# Patient Record
Sex: FEMALE | Race: WHITE | NOT HISPANIC OR LATINO | Employment: UNEMPLOYED | URBAN - NONMETROPOLITAN AREA
[De-identification: names, ages, dates, MRNs, and addresses within clinical notes are randomized per-mention and may not be internally consistent; named-entity substitution may affect disease eponyms.]

---

## 2018-11-22 ENCOUNTER — HOSPITAL ENCOUNTER (EMERGENCY)
Facility: HOSPITAL | Age: 73
Discharge: HOME | End: 2018-11-22
Attending: FAMILY MEDICINE

## 2018-11-22 VITALS
DIASTOLIC BLOOD PRESSURE: 76 MMHG | TEMPERATURE: 97.8 F | HEART RATE: 78 BPM | HEIGHT: 63 IN | OXYGEN SATURATION: 98 % | WEIGHT: 143 LBS | BODY MASS INDEX: 25.34 KG/M2 | SYSTOLIC BLOOD PRESSURE: 131 MMHG | RESPIRATION RATE: 18 BRPM

## 2018-11-22 DIAGNOSIS — S61.412A LACERATION OF LEFT HAND WITHOUT FOREIGN BODY, INITIAL ENCOUNTER: Primary | ICD-10-CM

## 2018-11-22 PROCEDURE — 99283 EMERGENCY DEPT VISIT LOW MDM: CPT

## 2018-11-22 RX ORDER — HYDROCHLOROTHIAZIDE 12.5 MG/1
12.5 TABLET ORAL DAILY
COMMUNITY

## 2018-11-22 RX ORDER — ATORVASTATIN CALCIUM 10 MG/1
10 TABLET, FILM COATED ORAL DAILY
COMMUNITY

## 2018-11-22 RX ORDER — LIDOCAINE HYDROCHLORIDE 10 MG/ML
5 INJECTION, SOLUTION EPIDURAL; INFILTRATION; INTRACAUDAL; PERINEURAL ONCE
Status: COMPLETED | OUTPATIENT
Start: 2018-11-22 | End: 2018-11-22

## 2018-11-22 RX ORDER — ASPIRIN 81 MG/1
81 TABLET ORAL DAILY
COMMUNITY

## 2018-11-22 RX ADMIN — LIDOCAINE HYDROCHLORIDE 5 ML: 10 INJECTION, SOLUTION EPIDURAL; INFILTRATION; INTRACAUDAL at 17:14

## 2018-11-22 NOTE — ED PROVIDER NOTES
Subjective   73-year-old female who presents to the ED today for a laceration to the left hand.  She states she was carving a turkey when the knife slipped and cut her hand.  This occurred approximately 90 minutes ago.  She states her tetanus vaccination is up-to-date.  She denies any other injury.        History provided by:  Patient  Laceration   Location:  Hand  Hand laceration location:  Dorsum of L hand  Length:  2 cm  Depth:  Through dermis  Quality: straight    Bleeding: controlled    Time since incident:  90 minutes  Laceration mechanism:  Knife  Pain details:     Quality:  Aching    Severity:  Mild    Timing:  Constant    Progression:  Unchanged  Foreign body present:  No foreign bodies  Relieved by:  Nothing  Worsened by:  Nothing  Tetanus status:  Up to date  Associated symptoms: no redness and no swelling        Review of Systems   Constitutional: Negative.    HENT: Negative.    Eyes: Negative.    Respiratory: Negative.    Cardiovascular: Negative.    Gastrointestinal: Negative.    Genitourinary: Negative.    Musculoskeletal: Negative.    Skin: Positive for wound.   Neurological: Negative.    Psychiatric/Behavioral: Negative.    All other systems reviewed and are negative.      Past Medical History:   Diagnosis Date   • Hyperlipidemia    • Hypertension        Allergies   Allergen Reactions   • Sulfa Antibiotics Hives       History reviewed. No pertinent surgical history.    History reviewed. No pertinent family history.    Social History     Socioeconomic History   • Marital status:      Spouse name: Not on file   • Number of children: Not on file   • Years of education: Not on file   • Highest education level: Not on file   Tobacco Use   • Smoking status: Never Smoker   Substance and Sexual Activity   • Alcohol use: No     Frequency: Never   • Drug use: No   • Sexual activity: Defer           Objective   Physical Exam   Constitutional: She is oriented to person, place, and time. She appears  well-developed and well-nourished. No distress.   HENT:   Head: Normocephalic and atraumatic.   Right Ear: External ear normal.   Left Ear: External ear normal.   Nose: Nose normal.   Mouth/Throat: Oropharynx is clear and moist.   Eyes: Conjunctivae and EOM are normal. Pupils are equal, round, and reactive to light.   Neck: Normal range of motion. Neck supple.   Cardiovascular: Normal rate, regular rhythm, normal heart sounds and intact distal pulses.   Pulmonary/Chest: Effort normal and breath sounds normal.   Abdominal: Soft. Bowel sounds are normal.   Musculoskeletal: Normal range of motion.   2 cm laceration to the dorsum of the left hand along the area of the 5th metacarpal, bleeding controlled, no tendon damage noted.  Patient has full ROM of her fingers passively and against resistance.   Neurological: She is alert and oriented to person, place, and time.   Skin: Skin is warm and dry. Capillary refill takes less than 2 seconds.   Psychiatric: She has a normal mood and affect. Her behavior is normal. Judgment and thought content normal.   Nursing note and vitals reviewed.      Laceration Repair  Date/Time: 11/22/2018 5:17 PM  Performed by: Magali Lucero PA  Authorized by: Pau Scott DO     Consent:     Consent obtained:  Verbal    Consent given by:  Patient    Risks discussed:  Infection and pain  Anesthesia (see MAR for exact dosages):     Anesthesia method:  Local infiltration    Local anesthetic:  Lidocaine 1% w/o epi  Laceration details:     Location:  Hand    Hand location:  L hand, dorsum    Length (cm):  2  Repair type:     Repair type:  Simple  Pre-procedure details:     Preparation:  Patient was prepped and draped in usual sterile fashion  Exploration:     Hemostasis achieved with:  Direct pressure    Wound exploration: wound explored through full range of motion and entire depth of wound probed and visualized      Wound extent: no fascia violation noted, no foreign bodies/material  noted, no nerve damage noted, no tendon damage noted and no vascular damage noted      Contaminated: no    Treatment:     Area cleansed with:  Betadine    Amount of cleaning:  Standard  Skin repair:     Repair method:  Sutures    Suture size:  4-0    Suture material:  Nylon    Suture technique:  Simple interrupted    Number of sutures:  4  Approximation:     Approximation:  Close  Post-procedure details:     Dressing:  Non-adherent dressing    Patient tolerance of procedure:  Tolerated well, no immediate complications               ED Course  ED Course as of Nov 22 1837   Thu Nov 22, 2018   1718 Patient's laceration repaired and she tolerated well, she was given wound care instructions and will have her sutures removed in 7 days.  [AH]      ED Course User Index  [AH] Magali Lucero PA                  [facility]  Number of Diagnoses or Management Options  Laceration of left hand without foreign body, initial encounter:   Patient Progress  Patient progress: improved        Final diagnoses:   Laceration of left hand without foreign body, initial encounter            Magali Lucero PA  11/22/18 3607

## 2019-01-24 ENCOUNTER — COMPLETE SKIN EXAM (OUTPATIENT)
Dept: URBAN - METROPOLITAN AREA CLINIC 14 | Facility: CLINIC | Age: 74
Setting detail: DERMATOLOGY
End: 2019-01-24

## 2019-01-24 DIAGNOSIS — C44.529 SQUAMOUS CELL CARCINOMA OF SKIN OF OTHER PART OF TRUNK: ICD-10-CM

## 2019-01-24 PROCEDURE — 99203 OFFICE O/P NEW LOW 30 MIN: CPT

## 2021-04-09 ENCOUNTER — OFFICE VISIT (OUTPATIENT)
Dept: INTERNAL MEDICINE | Facility: CLINIC | Age: 76
End: 2021-04-09
Payer: MEDICARE

## 2021-04-09 VITALS
RESPIRATION RATE: 16 BRPM | HEIGHT: 63 IN | HEART RATE: 73 BPM | OXYGEN SATURATION: 98 % | DIASTOLIC BLOOD PRESSURE: 72 MMHG | SYSTOLIC BLOOD PRESSURE: 118 MMHG | WEIGHT: 143 LBS | BODY MASS INDEX: 25.34 KG/M2

## 2021-04-09 DIAGNOSIS — I10 ESSENTIAL HYPERTENSION: Primary | ICD-10-CM

## 2021-04-09 DIAGNOSIS — E78.5 HYPERLIPIDEMIA LDL GOAL <100: ICD-10-CM

## 2021-04-09 PROCEDURE — 99999 PR PBB SHADOW E&M-EST. PATIENT-LVL IV: CPT | Mod: PBBFAC,,, | Performed by: INTERNAL MEDICINE

## 2021-04-09 PROCEDURE — 99214 OFFICE O/P EST MOD 30 MIN: CPT | Mod: PBBFAC | Performed by: INTERNAL MEDICINE

## 2021-04-09 PROCEDURE — 99213 PR OFFICE/OUTPT VISIT, EST, LEVL III, 20-29 MIN: ICD-10-PCS | Mod: S$PBB,,, | Performed by: INTERNAL MEDICINE

## 2021-04-09 PROCEDURE — 99213 OFFICE O/P EST LOW 20 MIN: CPT | Mod: S$PBB,,, | Performed by: INTERNAL MEDICINE

## 2021-04-09 PROCEDURE — 99999 PR PBB SHADOW E&M-EST. PATIENT-LVL IV: ICD-10-PCS | Mod: PBBFAC,,, | Performed by: INTERNAL MEDICINE

## 2021-04-09 RX ORDER — ATORVASTATIN CALCIUM 10 MG/1
10 TABLET, FILM COATED ORAL EVERY OTHER DAY
COMMUNITY
Start: 2021-02-28 | End: 2022-01-24 | Stop reason: SDUPTHER

## 2021-04-09 RX ORDER — TRIAMTERENE/HYDROCHLOROTHIAZID 37.5-25 MG
1 TABLET ORAL DAILY
COMMUNITY
Start: 2021-02-08 | End: 2022-03-14 | Stop reason: SDUPTHER

## 2021-04-27 ENCOUNTER — OFFICE VISIT (OUTPATIENT)
Dept: OBSTETRICS AND GYNECOLOGY | Facility: CLINIC | Age: 76
End: 2021-04-27
Payer: MEDICARE

## 2021-04-27 VITALS
HEART RATE: 64 BPM | RESPIRATION RATE: 16 BRPM | WEIGHT: 145 LBS | DIASTOLIC BLOOD PRESSURE: 89 MMHG | HEIGHT: 63 IN | BODY MASS INDEX: 25.69 KG/M2 | SYSTOLIC BLOOD PRESSURE: 159 MMHG | TEMPERATURE: 98 F

## 2021-04-27 DIAGNOSIS — Z00.00 ANNUAL PHYSICAL EXAM: ICD-10-CM

## 2021-04-27 DIAGNOSIS — E78.9 LIPID DISORDER: ICD-10-CM

## 2021-04-27 DIAGNOSIS — R35.0 FREQUENCY OF URINATION: ICD-10-CM

## 2021-04-27 DIAGNOSIS — E55.9 VITAMIN D DEFICIENCY: ICD-10-CM

## 2021-04-27 DIAGNOSIS — N39.3 URINARY, INCONTINENCE, STRESS FEMALE: ICD-10-CM

## 2021-04-27 DIAGNOSIS — I10 ESSENTIAL HYPERTENSION: ICD-10-CM

## 2021-04-27 DIAGNOSIS — N89.8 VAGINAL ODOR: Primary | ICD-10-CM

## 2021-04-27 DIAGNOSIS — R39.15 URGENCY OF URINATION: ICD-10-CM

## 2021-04-27 PROCEDURE — 36415 COLL VENOUS BLD VENIPUNCTURE: CPT | Performed by: OBSTETRICS & GYNECOLOGY

## 2021-04-27 PROCEDURE — 80053 COMPREHEN METABOLIC PANEL: CPT | Mod: ,,, | Performed by: CLINICAL MEDICAL LABORATORY

## 2021-04-27 PROCEDURE — G0101 CA SCREEN;PELVIC/BREAST EXAM: HCPCS | Mod: ,,, | Performed by: OBSTETRICS & GYNECOLOGY

## 2021-04-27 PROCEDURE — 85025 COMPLETE CBC W/AUTO DIFF WBC: CPT | Mod: ,,, | Performed by: CLINICAL MEDICAL LABORATORY

## 2021-04-27 PROCEDURE — 80061 LIPID PANEL: ICD-10-PCS | Mod: ,,, | Performed by: CLINICAL MEDICAL LABORATORY

## 2021-04-27 PROCEDURE — 36415 PR COLLECTION VENOUS BLOOD,VENIPUNCTURE: ICD-10-PCS | Mod: ,,, | Performed by: OBSTETRICS & GYNECOLOGY

## 2021-04-27 PROCEDURE — 85025 CBC WITH DIFFERENTIAL: ICD-10-PCS | Mod: ,,, | Performed by: CLINICAL MEDICAL LABORATORY

## 2021-04-27 PROCEDURE — 80053 COMPREHENSIVE METABOLIC PANEL: ICD-10-PCS | Mod: ,,, | Performed by: CLINICAL MEDICAL LABORATORY

## 2021-04-27 PROCEDURE — 82306 VITAMIN D 25 HYDROXY: CPT | Mod: ,,, | Performed by: CLINICAL MEDICAL LABORATORY

## 2021-04-27 PROCEDURE — 80061 LIPID PANEL: CPT | Mod: ,,, | Performed by: CLINICAL MEDICAL LABORATORY

## 2021-04-27 PROCEDURE — 36415 COLL VENOUS BLD VENIPUNCTURE: CPT | Mod: ,,, | Performed by: OBSTETRICS & GYNECOLOGY

## 2021-04-27 PROCEDURE — 82306 VITAMIN D: ICD-10-PCS | Mod: ,,, | Performed by: CLINICAL MEDICAL LABORATORY

## 2021-04-27 PROCEDURE — G0101 PR CA SCREEN;PELVIC/BREAST EXAM: ICD-10-PCS | Mod: ,,, | Performed by: OBSTETRICS & GYNECOLOGY

## 2021-04-27 RX ORDER — ESTRADIOL 0.1 MG/G
1 CREAM VAGINAL DAILY
Qty: 42.5 G | Refills: 1 | Status: SHIPPED | OUTPATIENT
Start: 2021-04-27 | End: 2024-01-25 | Stop reason: ALTCHOICE

## 2021-04-27 RX ORDER — TOLTERODINE 4 MG/1
4 CAPSULE, EXTENDED RELEASE ORAL DAILY
Qty: 30 CAPSULE | Refills: 2 | Status: SHIPPED | OUTPATIENT
Start: 2021-04-27 | End: 2022-05-26

## 2021-04-28 LAB
25(OH)D3 SERPL-MCNC: 51.4 NG/ML
ALBUMIN SERPL BCP-MCNC: 4.4 G/DL (ref 3.5–5)
ALBUMIN/GLOB SERPL: 1.6 {RATIO}
ALP SERPL-CCNC: 87 U/L (ref 55–142)
ALT SERPL W P-5'-P-CCNC: 31 U/L (ref 13–56)
ANION GAP SERPL CALCULATED.3IONS-SCNC: 9 MMOL/L (ref 7–16)
AST SERPL W P-5'-P-CCNC: 24 U/L (ref 15–37)
BASOPHILS # BLD AUTO: 0.09 K/UL (ref 0–0.2)
BASOPHILS NFR BLD AUTO: 1.4 % (ref 0–1)
BILIRUB SERPL-MCNC: 0.3 MG/DL (ref 0–1.2)
BUN SERPL-MCNC: 20 MG/DL (ref 7–18)
BUN/CREAT SERPL: 27 (ref 6–20)
CALCIUM SERPL-MCNC: 9.3 MG/DL (ref 8.5–10.1)
CHLORIDE SERPL-SCNC: 103 MMOL/L (ref 98–107)
CHOLEST SERPL-MCNC: 201 MG/DL (ref 0–200)
CHOLEST/HDLC SERPL: 3.9 {RATIO}
CO2 SERPL-SCNC: 30 MMOL/L (ref 21–32)
CREAT SERPL-MCNC: 0.74 MG/DL (ref 0.55–1.02)
DIFFERENTIAL METHOD BLD: ABNORMAL
EOSINOPHIL # BLD AUTO: 0.21 K/UL (ref 0–0.5)
EOSINOPHIL NFR BLD AUTO: 3.3 % (ref 1–4)
ERYTHROCYTE [DISTWIDTH] IN BLOOD BY AUTOMATED COUNT: 13 % (ref 11.5–14.5)
GLOBULIN SER-MCNC: 2.7 G/DL (ref 2–4)
GLUCOSE SERPL-MCNC: 95 MG/DL (ref 74–106)
HCT VFR BLD AUTO: 41.8 % (ref 38–47)
HDLC SERPL-MCNC: 51 MG/DL (ref 40–60)
HGB BLD-MCNC: 13.3 G/DL (ref 12–16)
IMM GRANULOCYTES # BLD AUTO: 0.02 K/UL (ref 0–0.04)
IMM GRANULOCYTES NFR BLD: 0.3 % (ref 0–0.4)
LDLC SERPL CALC-MCNC: 108 MG/DL
LDLC/HDLC SERPL: 2.1 {RATIO}
LYMPHOCYTES # BLD AUTO: 1.98 K/UL (ref 1–4.8)
LYMPHOCYTES NFR BLD AUTO: 31.5 % (ref 27–41)
MCH RBC QN AUTO: 28.7 PG (ref 27–31)
MCHC RBC AUTO-ENTMCNC: 31.8 G/DL (ref 32–36)
MCV RBC AUTO: 90.1 FL (ref 80–96)
MONOCYTES # BLD AUTO: 0.56 K/UL (ref 0–0.8)
MONOCYTES NFR BLD AUTO: 8.9 % (ref 2–6)
MPC BLD CALC-MCNC: 10.8 FL (ref 9.4–12.4)
NEUTROPHILS # BLD AUTO: 3.42 K/UL (ref 1.8–7.7)
NEUTROPHILS NFR BLD AUTO: 54.6 % (ref 53–65)
NONHDLC SERPL-MCNC: 150 MG/DL
NRBC # BLD AUTO: 0 X10E3/UL
NRBC, AUTO (.00): 0 %
PLATELET # BLD AUTO: 275 K/UL (ref 150–400)
POTASSIUM SERPL-SCNC: 3.8 MMOL/L (ref 3.5–5.1)
PROT SERPL-MCNC: 7.1 G/DL (ref 6.4–8.2)
RBC # BLD AUTO: 4.64 M/UL (ref 4.2–5.4)
SODIUM SERPL-SCNC: 138 MMOL/L (ref 136–145)
TRIGL SERPL-MCNC: 210 MG/DL (ref 35–150)
VLDLC SERPL-MCNC: 42 MG/DL
WBC # BLD AUTO: 6.28 K/UL (ref 4.5–11)

## 2021-09-09 ENCOUNTER — OFFICE VISIT (OUTPATIENT)
Dept: FAMILY MEDICINE | Facility: CLINIC | Age: 76
End: 2021-09-09
Payer: MEDICARE

## 2021-09-09 VITALS
DIASTOLIC BLOOD PRESSURE: 84 MMHG | OXYGEN SATURATION: 97 % | HEART RATE: 68 BPM | SYSTOLIC BLOOD PRESSURE: 134 MMHG | RESPIRATION RATE: 18 BRPM | BODY MASS INDEX: 26.31 KG/M2 | HEIGHT: 62 IN | WEIGHT: 143 LBS

## 2021-09-09 DIAGNOSIS — R32 URINARY INCONTINENCE, UNSPECIFIED TYPE: ICD-10-CM

## 2021-09-09 DIAGNOSIS — E78.5 HYPERLIPIDEMIA, UNSPECIFIED HYPERLIPIDEMIA TYPE: Primary | ICD-10-CM

## 2021-09-09 DIAGNOSIS — I10 ESSENTIAL HYPERTENSION: ICD-10-CM

## 2021-09-09 DIAGNOSIS — G47.33 OSA (OBSTRUCTIVE SLEEP APNEA): ICD-10-CM

## 2021-09-09 PROCEDURE — 99203 PR OFFICE/OUTPT VISIT, NEW, LEVL III, 30-44 MIN: ICD-10-PCS | Mod: ,,, | Performed by: INTERNAL MEDICINE

## 2021-09-09 PROCEDURE — 99203 OFFICE O/P NEW LOW 30 MIN: CPT | Mod: ,,, | Performed by: INTERNAL MEDICINE

## 2021-10-20 DIAGNOSIS — G47.61 PERIODIC LIMB MOVEMENT DISORDER: ICD-10-CM

## 2021-10-20 DIAGNOSIS — G47.30 SLEEP APNEA, UNSPECIFIED: Primary | ICD-10-CM

## 2021-11-01 ENCOUNTER — PROCEDURE VISIT (OUTPATIENT)
Dept: SLEEP MEDICINE | Facility: HOSPITAL | Age: 76
End: 2021-11-01
Attending: INTERNAL MEDICINE
Payer: MEDICARE

## 2021-11-01 DIAGNOSIS — G47.30 SLEEP APNEA, UNSPECIFIED: ICD-10-CM

## 2021-11-01 DIAGNOSIS — G47.61 PERIODIC LIMB MOVEMENT DISORDER: ICD-10-CM

## 2021-11-01 PROCEDURE — 95810 POLYSOM 6/> YRS 4/> PARAM: CPT | Mod: PO

## 2021-11-02 DIAGNOSIS — G47.33 OSA (OBSTRUCTIVE SLEEP APNEA): Primary | ICD-10-CM

## 2021-12-07 ENCOUNTER — PROCEDURE VISIT (OUTPATIENT)
Dept: SLEEP MEDICINE | Facility: HOSPITAL | Age: 76
End: 2021-12-07
Attending: INTERNAL MEDICINE
Payer: MEDICARE

## 2021-12-07 DIAGNOSIS — G47.33 OSA (OBSTRUCTIVE SLEEP APNEA): ICD-10-CM

## 2021-12-07 PROCEDURE — 95811 POLYSOM 6/>YRS CPAP 4/> PARM: CPT | Mod: PO

## 2022-01-12 ENCOUNTER — OFFICE VISIT (OUTPATIENT)
Dept: GASTROENTEROLOGY | Facility: CLINIC | Age: 77
End: 2022-01-12
Payer: MEDICARE

## 2022-01-12 VITALS
SYSTOLIC BLOOD PRESSURE: 147 MMHG | WEIGHT: 147 LBS | DIASTOLIC BLOOD PRESSURE: 77 MMHG | HEIGHT: 62 IN | OXYGEN SATURATION: 99 % | BODY MASS INDEX: 27.05 KG/M2 | HEART RATE: 69 BPM

## 2022-01-12 DIAGNOSIS — R13.10 DYSPHAGIA, UNSPECIFIED TYPE: ICD-10-CM

## 2022-01-12 DIAGNOSIS — R11.2 NAUSEA AND VOMITING, INTRACTABILITY OF VOMITING NOT SPECIFIED, UNSPECIFIED VOMITING TYPE: Primary | ICD-10-CM

## 2022-01-12 PROCEDURE — 99214 OFFICE O/P EST MOD 30 MIN: CPT | Mod: ,,, | Performed by: NURSE PRACTITIONER

## 2022-01-12 PROCEDURE — 99214 PR OFFICE/OUTPT VISIT, EST, LEVL IV, 30-39 MIN: ICD-10-PCS | Mod: ,,, | Performed by: NURSE PRACTITIONER

## 2022-01-12 RX ORDER — ASPIRIN 81 MG/1
81 TABLET ORAL EVERY OTHER DAY
COMMUNITY
End: 2023-06-26

## 2022-01-12 RX ORDER — LANOLIN ALCOHOL/MO/W.PET/CERES
1 CREAM (GRAM) TOPICAL DAILY
COMMUNITY
End: 2024-01-25

## 2022-01-12 NOTE — PROGRESS NOTES
Jennifer Hale is a 76 y.o. female here for Emesis        PCP: Ten Mejía  Referring Provider: No referring provider defined for this encounter.     HPI:  Presents with c/o intermittent episodes of vomiting. Not able to identify a trigger. Did have a BM during vomiting episodes. No hematochezia or melena. Not associated with eating. One of the episodes was nocturnal. Reports dysphagia with solid foods at times. States she feels like she has to swallow twice to get the food to pass. [place] Report reviewed. The patient did have gastric polyps and at that time PPI was stopped. She denies significant reflux. Father had gastric lymphoma. Brother and sister also had lymphoma unsure of exact diagnosis. Last colonoscopy 12/13/19, hemorrhoids. She states she is currently looking to establish with a new PCM.        ROS:  Review of Systems   Constitutional: Negative for appetite change, fatigue, fever and unexpected weight change.   HENT: Positive for trouble swallowing.    Respiratory: Negative for shortness of breath and wheezing.    Cardiovascular: Negative for chest pain and palpitations.   Gastrointestinal: Positive for nausea and vomiting. Negative for abdominal pain, blood in stool, change in bowel habit, constipation, diarrhea, rectal pain, reflux, fecal incontinence and change in bowel habit.   Genitourinary: Negative for dysuria, frequency and urgency.   Musculoskeletal: Negative for back pain, gait problem and joint swelling.   Integumentary:  Negative for pallor and rash.   Allergic/Immunologic: Negative for food allergies.   Neurological: Negative for dizziness, weakness and light-headedness.   Hematological: Does not bruise/bleed easily.   Psychiatric/Behavioral: The patient is not nervous/anxious.           PMHX:  has a past medical history of Cardiac murmur, Hyperlipidemia, and Hypertension.    PSHX:  has a past surgical history that includes Total abdominal hysterectomy w/ bilateral  "salpingoophorectomy (01/18/2000); Tear duct surgery; basil cell carcinoma; and Hysterectomy.    PFHX: family history includes Colon cancer in her maternal grandfather; Hypertension in her mother; Lymphoma in her brother, father, mother, and sister.    PSlHX:  reports that she has never smoked. She has never used smokeless tobacco. She reports current alcohol use. She reports that she does not use drugs.        Review of patient's allergies indicates:   Allergen Reactions    Sulfa (sulfonamide antibiotics)        Medication List with Changes/Refills   Current Medications    ASPIRIN (ECOTRIN) 81 MG EC TABLET    Take 81 mg by mouth every other day.    ATORVASTATIN (LIPITOR) 10 MG TABLET    Take 10 mg by mouth every other day.    CALCIUM CITRATE-VITAMIN D3 315-200 MG (CITRACAL+D) 315 MG-5 MCG (200 UNIT) PER TABLET    Take 1 tablet by mouth once daily.    ESTRADIOL (ESTRACE) 0.01 % (0.1 MG/GRAM) VAGINAL CREAM    Place 1 g vaginally once daily.    MULTIVITAMIN CAPSULE    Take 1 capsule by mouth once daily.    TOLTERODINE (DETROL LA) 4 MG 24 HR CAPSULE    Take 1 capsule (4 mg total) by mouth once daily.    TRIAMTERENE-HYDROCHLOROTHIAZIDE 37.5-25 MG (MAXZIDE-25) 37.5-25 MG PER TABLET    Take 1 tablet by mouth once daily.        Objective Findings:  Vital Signs:  BP (!) 147/77   Pulse 69   Ht 5' 2" (1.575 m)   Wt 66.7 kg (147 lb)   SpO2 99%   BMI 26.89 kg/m²  Body mass index is 26.89 kg/m².    Physical Exam:  Physical Exam  Vitals and nursing note reviewed.   Constitutional:       General: She is not in acute distress.     Appearance: Normal appearance. She is not ill-appearing.   HENT:      Mouth/Throat:      Mouth: Mucous membranes are moist.   Eyes:      General: No scleral icterus.  Neck:      Thyroid: No thyroid mass.   Cardiovascular:      Rate and Rhythm: Normal rate and regular rhythm.      Heart sounds: No murmur heard.      Pulmonary:      Breath sounds: No wheezing, rhonchi or rales.   Abdominal:      " General: Bowel sounds are normal. There is no distension.      Palpations: Abdomen is soft. There is no mass.      Tenderness: There is no abdominal tenderness. There is no guarding or rebound.      Hernia: No hernia is present.   Musculoskeletal:      Right lower leg: No edema.      Left lower leg: No edema.   Lymphadenopathy:      Cervical: No cervical adenopathy.      Right cervical: No superficial cervical adenopathy.     Left cervical: No superficial cervical adenopathy.   Skin:     General: Skin is warm and dry.      Coloration: Skin is not jaundiced or pale.      Findings: No bruising or rash.   Neurological:      Mental Status: She is alert and oriented to person, place, and time.   Psychiatric:         Mood and Affect: Mood normal.          Labs:  Lab Results   Component Value Date    WBC 6.28 04/27/2021    HGB 13.3 04/27/2021    HCT 41.8 04/27/2021    MCV 90.1 04/27/2021    RDW 13.0 04/27/2021     04/27/2021    LYMPH 31.5 04/27/2021    LYMPH 1.98 04/27/2021    MONO 8.9 (H) 04/27/2021    EOS 0.21 04/27/2021    BASO 0.09 04/27/2021     Lab Results   Component Value Date     04/27/2021    K 3.8 04/27/2021     04/27/2021    CO2 30 04/27/2021    GLU 95 04/27/2021    BUN 20 (H) 04/27/2021    CREATININE 0.74 04/27/2021    CALCIUM 9.3 04/27/2021    PROT 7.1 04/27/2021    ALBUMIN 4.4 04/27/2021    BILITOT 0.3 04/27/2021    ALKPHOS 87 04/27/2021    AST 24 04/27/2021    ALT 31 04/27/2021         Imaging: No results found.      Assessment:  Jennifer Hale is a 76 y.o. female here with:  1. Nausea and vomiting, intractability of vomiting not specified, unspecified vomiting type    2. Dysphagia, unspecified type          Recommendations:  1. Schedule abdominal ultrasound and EGD/Dilation  2. CBC and CMP today.   3. Avoid NSAID's. Do not eat within 3 hours of going to bed. Avoid spicy greasy foods.  4. Establish with PCM    Follow up in about 8 weeks (around 3/9/2022).      Order summary:  Orders Placed  This Encounter    US Abdomen Complete    CBC Auto Differential    Comprehensive Metabolic Panel       Thank you for allowing me to participate in the care of Jennifer Hale.      LUZ Capone

## 2022-01-18 ENCOUNTER — HOSPITAL ENCOUNTER (OUTPATIENT)
Dept: RADIOLOGY | Facility: HOSPITAL | Age: 77
Discharge: HOME | End: 2022-01-18
Attending: NURSE PRACTITIONER
Payer: MEDICARE

## 2022-01-18 DIAGNOSIS — R11.2 NAUSEA AND VOMITING, INTRACTABILITY OF VOMITING NOT SPECIFIED, UNSPECIFIED VOMITING TYPE: ICD-10-CM

## 2022-01-18 PROCEDURE — 76700 US EXAM ABDOM COMPLETE: CPT | Mod: TC

## 2022-01-18 PROCEDURE — 76700 US EXAM ABDOM COMPLETE: CPT | Mod: 26,,,

## 2022-01-18 PROCEDURE — 76700 US ABDOMEN COMPLETE: ICD-10-PCS | Mod: 26,,,

## 2022-01-19 DIAGNOSIS — K80.20 CALCULUS OF GALLBLADDER WITHOUT CHOLECYSTITIS WITHOUT OBSTRUCTION: Primary | ICD-10-CM

## 2022-01-19 NOTE — PROGRESS NOTES
Call to patient with results of ultrasound. Cholelithiasis. She does continue to report episodes of nausea and vomiting and abdominal pain. We will refer to a surgeon for her to discuss plan.

## 2022-01-24 ENCOUNTER — OFFICE VISIT (OUTPATIENT)
Dept: INTERNAL MEDICINE | Facility: CLINIC | Age: 77
End: 2022-01-24
Payer: MEDICARE

## 2022-01-24 VITALS
HEART RATE: 78 BPM | BODY MASS INDEX: 27.05 KG/M2 | HEIGHT: 62 IN | WEIGHT: 147 LBS | DIASTOLIC BLOOD PRESSURE: 80 MMHG | OXYGEN SATURATION: 99 % | SYSTOLIC BLOOD PRESSURE: 140 MMHG

## 2022-01-24 DIAGNOSIS — R11.2 NAUSEA AND VOMITING, INTRACTABILITY OF VOMITING NOT SPECIFIED, UNSPECIFIED VOMITING TYPE: ICD-10-CM

## 2022-01-24 DIAGNOSIS — E78.9 LIPID DISORDER: ICD-10-CM

## 2022-01-24 DIAGNOSIS — N39.3 URINARY, INCONTINENCE, STRESS FEMALE: ICD-10-CM

## 2022-01-24 DIAGNOSIS — Z76.89 ENCOUNTER TO ESTABLISH CARE WITH NEW DOCTOR: Primary | ICD-10-CM

## 2022-01-24 DIAGNOSIS — E78.5 DYSLIPIDEMIA: ICD-10-CM

## 2022-01-24 DIAGNOSIS — I10 ESSENTIAL HYPERTENSION: ICD-10-CM

## 2022-01-24 PROCEDURE — 99213 OFFICE O/P EST LOW 20 MIN: CPT | Mod: PBBFAC | Performed by: INTERNAL MEDICINE

## 2022-01-24 PROCEDURE — 99214 PR OFFICE/OUTPT VISIT, EST, LEVL IV, 30-39 MIN: ICD-10-PCS | Mod: S$PBB,,, | Performed by: INTERNAL MEDICINE

## 2022-01-24 PROCEDURE — 99214 OFFICE O/P EST MOD 30 MIN: CPT | Mod: S$PBB,,, | Performed by: INTERNAL MEDICINE

## 2022-01-24 RX ORDER — ATORVASTATIN CALCIUM 10 MG/1
10 TABLET, FILM COATED ORAL DAILY
Qty: 90 TABLET | Refills: 3 | Status: SHIPPED | OUTPATIENT
Start: 2022-01-24 | End: 2022-11-07

## 2022-01-24 NOTE — PROGRESS NOTES
Subjective:       Patient ID: Jennifer Hale is a 76 y.o. female.    Chief Complaint: Establish Care (New pt)    The patient is a 76-year-old white female the presents today to [facility].  She has a history of hypertension, dyslipidemia, GERD.  She is up-to-date on mammogram and colonoscopy.  In December she had some issues with nausea and vomiting.  She was seen by GI and they performed an abdominal ultrasound which showed some cholelithiasis.  She has an appointment scheduled for tomorrow with surgery.  She has been vaccinated against COVID-19 including the booster.  Today she is resting comfortably in no distress.  She is afebrile vital signs are stable.    Review of Systems   Constitutional: Negative for appetite change, chills, fatigue and fever.   HENT: Negative for nasal congestion, ear pain, hearing loss, sinus pressure/congestion and sore throat.    Eyes: Negative for pain, redness and visual disturbance.   Respiratory: Negative for apnea, cough, shortness of breath and wheezing.    Cardiovascular: Negative for chest pain and palpitations.   Gastrointestinal: Positive for nausea. Negative for abdominal pain, constipation and diarrhea.   Endocrine: Negative for cold intolerance, heat intolerance and polyuria.   Genitourinary: Negative for dysuria and hematuria.   Musculoskeletal: Negative for arthralgias, back pain, joint swelling, myalgias and neck pain.   Integumentary:  Negative for pallor, rash and wound.   Allergic/Immunologic: Negative for immunocompromised state.   Neurological: Negative for tremors, seizures, weakness, headaches and memory loss.   Hematological: Negative for adenopathy.   Psychiatric/Behavioral: Negative for confusion, dysphoric mood and sleep disturbance. The patient is not nervous/anxious.          Objective:      Physical Exam  Vitals and nursing note reviewed.   Constitutional:       General: She is not in acute distress.     Appearance: Normal appearance. She is not  ill-appearing.   HENT:      Head: Normocephalic and atraumatic.      Right Ear: External ear normal.      Left Ear: External ear normal.      Nose: Nose normal.      Mouth/Throat:      Pharynx: Oropharynx is clear.   Eyes:      Extraocular Movements: Extraocular movements intact.      Conjunctiva/sclera: Conjunctivae normal.      Pupils: Pupils are equal, round, and reactive to light.   Neck:      Vascular: No carotid bruit.   Cardiovascular:      Rate and Rhythm: Normal rate and regular rhythm.      Pulses: Normal pulses.      Heart sounds: Normal heart sounds. No murmur heard.      Pulmonary:      Effort: No respiratory distress.      Breath sounds: Normal breath sounds. No wheezing or rales.   Abdominal:      General: Bowel sounds are normal.      Palpations: Abdomen is soft.   Musculoskeletal:         General: Normal range of motion.      Cervical back: Normal range of motion and neck supple.      Right lower leg: No edema.      Left lower leg: No edema.   Skin:     General: Skin is warm and dry.      Capillary Refill: Capillary refill takes less than 2 seconds.      Coloration: Skin is not pale.   Neurological:      General: No focal deficit present.      Mental Status: She is alert and oriented to person, place, and time.      Cranial Nerves: No cranial nerve deficit.      Sensory: No sensory deficit.   Psychiatric:         Mood and Affect: Mood normal.         Judgment: Judgment normal.         Assessment:       Problem List Items Addressed This Visit        Cardiac/Vascular    Lipid disorder    Essential hypertension       Renal/    Urinary, incontinence, stress female       GI    Nausea and vomiting      Other Visit Diagnoses     Encounter to establish care with new doctor    -  Primary    Dyslipidemia              Plan:       1. The patient presents today to establish care.  She is up-to-date on age-appropriate health screenings.  Mammogram and colonoscopy are up-to-date.  She has been vaccinated against  COVID-19.  This includes a booster.  She recently had lab work done at we have reviewed the lab work.    2. Essential hypertension-blood pressure is 140/80 today.  She is on max side.  We will continue with current care.    3. Dyslipidemia-lipid panel from January 7th of this year showed a total cholesterol 208, triglycerides of 205, LDL of 133 an HDL of 51.  She is currently on Lipitor every other day.  We will increase this to daily.  We can repeat a lipid panel in 6 months.    4. Nausea and vomiting-several episodes in the month of December.  She had ultrasound done which showed cholelithiasis.  She has been referred to see Dr. Monique in surgery tomorrow.  Currently she is not having any symptoms.    5. Urinary incontinence-she takes Detrol LA.  stable with no acute issues

## 2022-01-31 ENCOUNTER — OFFICE VISIT (OUTPATIENT)
Dept: SURGERY | Facility: CLINIC | Age: 77
End: 2022-01-31
Attending: STUDENT IN AN ORGANIZED HEALTH CARE EDUCATION/TRAINING PROGRAM
Payer: MEDICARE

## 2022-01-31 DIAGNOSIS — K80.20 CALCULUS OF GALLBLADDER WITHOUT CHOLECYSTITIS WITHOUT OBSTRUCTION: ICD-10-CM

## 2022-01-31 DIAGNOSIS — R10.10 UPPER ABDOMINAL PAIN, UNSPECIFIED: ICD-10-CM

## 2022-01-31 DIAGNOSIS — R10.30 LOWER ABDOMINAL PAIN: Primary | ICD-10-CM

## 2022-01-31 PROCEDURE — 99204 OFFICE O/P NEW MOD 45 MIN: CPT | Mod: S$PBB,,, | Performed by: STUDENT IN AN ORGANIZED HEALTH CARE EDUCATION/TRAINING PROGRAM

## 2022-01-31 PROCEDURE — 99214 OFFICE O/P EST MOD 30 MIN: CPT | Mod: PBBFAC | Performed by: STUDENT IN AN ORGANIZED HEALTH CARE EDUCATION/TRAINING PROGRAM

## 2022-01-31 PROCEDURE — 99204 PR OFFICE/OUTPT VISIT, NEW, LEVL IV, 45-59 MIN: ICD-10-PCS | Mod: S$PBB,,, | Performed by: STUDENT IN AN ORGANIZED HEALTH CARE EDUCATION/TRAINING PROGRAM

## 2022-01-31 NOTE — PATIENT INSTRUCTIONS
CT ABDOMEN/PELVIS  PREP INSTRUCTIONS    Your CT will be done at the [facility].    Drink first bottle of Readi-Cat the night before your CT scan at 9 PM.    Drink second bottle of Readi-Cat 1 hour before your scheduled appointment.      Please do not eat or drink anything after the first dose is given except for your necessary medications.    If you have had a previous allergic reation to CT or IVP dye, please notify the physician who ordered the CT so that a pre-intravenous prep can be started.

## 2022-01-31 NOTE — PROGRESS NOTES
History & Physical    SUBJECTIVE:     History of Present Illness:  77-year-old  female presents to the clinic for evaluation of lower abdominal pain.  Patient states he has a history of diverticulosis found on colonoscopy.  Last colonoscopy was done back in 2019 for rectal bleeding and they found diverticulosis and hemorrhoids and she was told she needed a repeat in 5 years.  Patient has had a EGD done back in 2017 which showed gastritis, but no ulcers, a few polyps and patient was placed on a PPI.  Patient states she started developing lower mid abdominal pain in December and after several weeks, this resolved.  The pain worsened the following week after having some high-fiber meals and popcorn.  Patient states she had a bowel movement and the pain went away.  Patient was concern for possible diverticulitis and was sent to a gastroenterologist; she saw the nurse practitioner, who did an ultrasound and showed several small gallstones and was then sent to surgery for evaluation.  Patient states she has not had pain since December.  Denies any chest pain/shortness of breath, nausea/vomiting/diarrhea, fever/chills.    Chief Complaint   Patient presents with    Gall Bladder Problem       Review of patient's allergies indicates:   Allergen Reactions    Sulfa (sulfonamide antibiotics)        Current Outpatient Medications   Medication Sig Dispense Refill    aspirin (ECOTRIN) 81 MG EC tablet Take 81 mg by mouth every other day.      atorvastatin (LIPITOR) 10 MG tablet Take 1 tablet (10 mg total) by mouth once daily. 90 tablet 3    calcium citrate-vitamin D3 315-200 mg (CITRACAL+D) 315 mg-5 mcg (200 unit) per tablet Take 1 tablet by mouth once daily.      estradioL (ESTRACE) 0.01 % (0.1 mg/gram) vaginal cream Place 1 g vaginally once daily. 42.5 g 1    multivitamin capsule Take 1 capsule by mouth once daily.      tolterodine (DETROL LA) 4 MG 24 hr capsule Take 1 capsule (4 mg total) by mouth once daily.  (Patient not taking: Reported on 1/12/2022) 30 capsule 2    triamterene-hydrochlorothiazide 37.5-25 mg (MAXZIDE-25) 37.5-25 mg per tablet Take 1 tablet by mouth once daily.       No current facility-administered medications for this visit.       Past Medical History:   Diagnosis Date    Cardiac murmur     GERD (gastroesophageal reflux disease)     Hyperlipidemia     Hypertension      Past Surgical History:   Procedure Laterality Date    basil cell carcinoma      left cheek    HYSTERECTOMY      TEAR DUCT SURGERY      right eye    TOTAL ABDOMINAL HYSTERECTOMY W/ BILATERAL SALPINGOOPHORECTOMY  01/18/2000     Family History   Problem Relation Age of Onset    Lymphoma Mother     Hypertension Mother     Lymphoma Father     Lymphoma Sister     Lymphoma Brother     Colon cancer Maternal Grandfather      Social History     Tobacco Use    Smoking status: Never Smoker    Smokeless tobacco: Never Used   Substance Use Topics    Alcohol use: Yes     Comment: only occas    Drug use: Never        Review of Systems:  Review of Systems   Constitutional: Negative.  Negative for fatigue and unexpected weight change.   HENT: Negative.  Negative for trouble swallowing.    Eyes: Negative.    Respiratory: Negative.  Negative for chest tightness and shortness of breath.    Cardiovascular: Negative.  Negative for chest pain.   Gastrointestinal: Positive for abdominal pain (Previously; none currently). Negative for blood in stool and nausea.   Endocrine: Negative.    Genitourinary: Negative.  Negative for hematuria.   Musculoskeletal: Negative.  Negative for back pain and myalgias.   Neurological: Negative.  Negative for dizziness, speech difficulty, weakness and light-headedness.   Psychiatric/Behavioral: Negative.  Negative for agitation and behavioral problems.       OBJECTIVE:     Vital Signs (Most Recent)              Physical Exam:  Physical Exam  Constitutional:       General: She is not in acute distress.      Appearance: Normal appearance.   HENT:      Head: Normocephalic and atraumatic.      Nose: Nose normal.   Eyes:      General: No scleral icterus.     Pupils: Pupils are equal, round, and reactive to light.   Cardiovascular:      Rate and Rhythm: Normal rate.   Pulmonary:      Effort: Pulmonary effort is normal. No respiratory distress.      Breath sounds: Normal breath sounds.   Abdominal:      General: There is no distension.      Palpations: Abdomen is soft.      Tenderness: There is no abdominal tenderness. There is no guarding.   Musculoskeletal:         General: Normal range of motion.      Cervical back: Normal range of motion and neck supple.   Skin:     General: Skin is warm.      Coloration: Skin is not jaundiced.   Neurological:      General: No focal deficit present.      Mental Status: She is alert and oriented to person, place, and time.      Cranial Nerves: No cranial nerve deficit.   Psychiatric:         Mood and Affect: Mood normal.           ASSESSMENT/PLAN:     Gallstones; abdominal pain    PLAN:Plan     The gallstones could be an incidental finding; will get a HIDA scan with CCK to evaluate for cholecystitis versus biliary dyskinesia.  We will also get a CT scan the abdomen pelvis with p.o. and IV contrast to evaluate the colon rule out any signs of diverticulitis.  If all comes back negative, we will send the patient back to gastroenterology for an EGD and colonoscopy to evaluate for possible and gastritis/peptic ulcer disease and to rule out any colonic etiology

## 2022-02-03 ENCOUNTER — TELEPHONE (OUTPATIENT)
Dept: SURGERY | Facility: CLINIC | Age: 77
End: 2022-02-03
Payer: MEDICARE

## 2022-02-03 NOTE — TELEPHONE ENCOUNTER
Attempted to call Ms. Hale yesterday (2/2/22) to notify of appointment times for CT scan and HIDA scan. No answer. Voicemail left. Will mail letter.    CT scan - 2/8/22 @ 9 AM - [facility]  Take contrast as instructed     HIDA scan - 2/15/22 @ 10:30 - [facility]  Do not eat or drink or take any kind of pain meds after midnight the night before this test.

## 2022-02-08 ENCOUNTER — HOSPITAL ENCOUNTER (OUTPATIENT)
Dept: RADIOLOGY | Facility: HOSPITAL | Age: 77
Discharge: HOME | End: 2022-02-08
Attending: STUDENT IN AN ORGANIZED HEALTH CARE EDUCATION/TRAINING PROGRAM
Payer: MEDICARE

## 2022-02-08 ENCOUNTER — PATIENT MESSAGE (OUTPATIENT)
Dept: SURGERY | Facility: HOSPITAL | Age: 77
End: 2022-02-08
Payer: MEDICARE

## 2022-02-08 DIAGNOSIS — R10.30 LOWER ABDOMINAL PAIN: ICD-10-CM

## 2022-02-08 PROCEDURE — 25500020 PHARM REV CODE 255: Performed by: STUDENT IN AN ORGANIZED HEALTH CARE EDUCATION/TRAINING PROGRAM

## 2022-02-08 PROCEDURE — 74177 CT ABD & PELVIS W/CONTRAST: CPT | Mod: 26,,, | Performed by: RADIOLOGY

## 2022-02-08 PROCEDURE — 74177 CT ABD & PELVIS W/CONTRAST: CPT | Mod: TC

## 2022-02-08 PROCEDURE — 74177 CT ABDOMEN PELVIS WITH CONTRAST: ICD-10-PCS | Mod: 26,,, | Performed by: RADIOLOGY

## 2022-02-08 RX ADMIN — IOPAMIDOL 100 ML: 755 INJECTION, SOLUTION INTRAVENOUS at 08:02

## 2022-02-15 ENCOUNTER — HOSPITAL ENCOUNTER (OUTPATIENT)
Dept: RADIOLOGY | Facility: HOSPITAL | Age: 77
Discharge: HOME | End: 2022-02-15
Attending: STUDENT IN AN ORGANIZED HEALTH CARE EDUCATION/TRAINING PROGRAM
Payer: MEDICARE

## 2022-02-15 DIAGNOSIS — R10.10 UPPER ABDOMINAL PAIN, UNSPECIFIED: ICD-10-CM

## 2022-02-15 PROCEDURE — 78227 HEPATOBIL SYST IMAGE W/DRUG: CPT | Mod: 26,,, | Performed by: RADIOLOGY

## 2022-02-15 PROCEDURE — 78227 NM HEPATOBILIARY(HIDA) WITH PHARM AND EF: ICD-10-PCS | Mod: 26,,, | Performed by: RADIOLOGY

## 2022-02-15 PROCEDURE — A9537 TC99M MEBROFENIN: HCPCS

## 2022-02-22 ENCOUNTER — OFFICE VISIT (OUTPATIENT)
Dept: DERMATOLOGY | Facility: CLINIC | Age: 77
End: 2022-02-22
Payer: MEDICARE

## 2022-02-22 VITALS — WEIGHT: 147.06 LBS | BODY MASS INDEX: 27.06 KG/M2 | RESPIRATION RATE: 18 BRPM | HEIGHT: 62 IN

## 2022-02-22 DIAGNOSIS — Z85.828 HISTORY OF NONMELANOMA SKIN CANCER: ICD-10-CM

## 2022-02-22 DIAGNOSIS — L70.0 COMEDONE: Primary | ICD-10-CM

## 2022-02-22 PROCEDURE — 99202 PR OFFICE/OUTPT VISIT, NEW, LEVL II, 15-29 MIN: ICD-10-PCS | Mod: ,,, | Performed by: STUDENT IN AN ORGANIZED HEALTH CARE EDUCATION/TRAINING PROGRAM

## 2022-02-22 PROCEDURE — 99202 OFFICE O/P NEW SF 15 MIN: CPT | Mod: ,,, | Performed by: STUDENT IN AN ORGANIZED HEALTH CARE EDUCATION/TRAINING PROGRAM

## 2022-02-22 NOTE — PROGRESS NOTES
[facility]  Ronn Sheikh MD    [facility] [place] [address]  [phone]    Fax: [phone]    Patient Name: Jennifer Hale  Medical Record Number: [id number]  PCP: Ten Mejía MD  Age: 77 y.o. : 1945  Contact: [phone] (home)     CC: lesion on left cheek  History of Present Illness:     Jennifer Hale is a 77 y.o.  female with history of skin cancer (BCC left cheek removed  in [place]) who presents to clinic today for lesion on left cheek.  This has been present for 1 month. Symptoms include growth and erythema. Previous treatments include none. Other concerns today are none.      The patient has no other concerns today.    Review of Systems:     Unremarkable other than mentioned above.     Physical Exam:     General: Relaxed, oriented, alert    Skin examination of the scalp, face, neck, chest, back, abdomen, upper extremities and lower extremities were normal except for as listed below            Assessment and Plan:     1. History of NMSC   Well-healed scar on the left cheek  No e/o recurrence   Recommend sunscreen and good photoprotection       2. .Comedone of R cheek   - extracted today  - will monitor the above lesion in 6 months       Return to clinic in 6 months    AVS printed with patient instructions     Ronn Sheikh MD   Mohs Surgery/Dermatologic Oncology  Dermatology

## 2022-03-09 ENCOUNTER — OFFICE VISIT (OUTPATIENT)
Dept: GASTROENTEROLOGY | Facility: CLINIC | Age: 77
End: 2022-03-09
Payer: MEDICARE

## 2022-03-09 VITALS
DIASTOLIC BLOOD PRESSURE: 69 MMHG | SYSTOLIC BLOOD PRESSURE: 137 MMHG | OXYGEN SATURATION: 99 % | HEART RATE: 80 BPM | BODY MASS INDEX: 27.05 KG/M2 | HEIGHT: 62 IN | WEIGHT: 147 LBS

## 2022-03-09 DIAGNOSIS — K31.84 GASTROPARESIS: ICD-10-CM

## 2022-03-09 DIAGNOSIS — K82.8 DYSKINESIA OF GALLBLADDER: ICD-10-CM

## 2022-03-09 DIAGNOSIS — K76.89 HEPATIC CYST: ICD-10-CM

## 2022-03-09 PROCEDURE — 99213 PR OFFICE/OUTPT VISIT, EST, LEVL III, 20-29 MIN: ICD-10-PCS | Mod: ,,, | Performed by: NURSE PRACTITIONER

## 2022-03-09 PROCEDURE — 99213 OFFICE O/P EST LOW 20 MIN: CPT | Mod: ,,, | Performed by: NURSE PRACTITIONER

## 2022-03-09 NOTE — PROGRESS NOTES
Jennifer Hale is a 77 y.o. female here for Follow-up        PCP: [facility]  Referring Provider: No referring provider defined for this encounter.     HPI:  Presents in follow up to clinic. Patient brought GI records not previously seen for review. Patient did have a decreased gastric emptying study in 2004. Was given Reglan at that time for a short time. States that symptoms resolved. Hida scan abnormal. Reviewed with a 13 % ejection fraction. She states that she has not had any further symptoms of nausea, vomiting or abdominal pain. No diarrhea. CT scan reviewed. She does have a 1 cm hepatic cyst. She did have a cyst in the same area per record review in 2004. Has increased in size. Discussed that at this time since she is asymptomatic, we will not proceed with an EGD. She is not on PPI due to history of gastric polyps.        ROS:  Review of Systems   Constitutional: Negative for appetite change, fatigue, fever and unexpected weight change.   HENT: Negative for trouble swallowing.    Respiratory: Negative for shortness of breath.    Cardiovascular: Negative for chest pain.   Gastrointestinal: Negative for abdominal pain, blood in stool, change in bowel habit, constipation, diarrhea, nausea, vomiting, reflux and change in bowel habit.   Genitourinary: Negative for dysuria and frequency.   Musculoskeletal: Negative for gait problem.   Integumentary:  Negative for pallor.   Neurological: Negative for dizziness, weakness and light-headedness.   Hematological: Does not bruise/bleed easily.   Psychiatric/Behavioral: The patient is not nervous/anxious.           PMHX:  has a past medical history of Basal cell carcinoma, Cardiac murmur, GERD (gastroesophageal reflux disease), Hyperlipidemia, and Hypertension.    PSHX:  has a past surgical history that includes Total abdominal hysterectomy w/ bilateral salpingoophorectomy (01/18/2000); Tear duct surgery; basil cell carcinoma; and Hysterectomy.    PFHX: family  "history includes Colon cancer in her maternal grandfather; Hypertension in her mother; Lymphoma in her brother, father, mother, and sister.    PSlHX:  reports that she has never smoked. She has never used smokeless tobacco. She reports current alcohol use. She reports that she does not use drugs.        Review of patient's allergies indicates:   Allergen Reactions    Sulfa (sulfonamide antibiotics)        Medication List with Changes/Refills   Current Medications    ASPIRIN (ECOTRIN) 81 MG EC TABLET    Take 81 mg by mouth every other day.    ATORVASTATIN (LIPITOR) 10 MG TABLET    Take 1 tablet (10 mg total) by mouth once daily.    CALCIUM CITRATE-VITAMIN D3 315-200 MG (CITRACAL+D) 315 MG-5 MCG (200 UNIT) PER TABLET    Take 1 tablet by mouth once daily.    ESTRADIOL (ESTRACE) 0.01 % (0.1 MG/GRAM) VAGINAL CREAM    Place 1 g vaginally once daily.    MULTIVITAMIN CAPSULE    Take 1 capsule by mouth once daily.    TOLTERODINE (DETROL LA) 4 MG 24 HR CAPSULE    Take 1 capsule (4 mg total) by mouth once daily.    TRIAMTERENE-HYDROCHLOROTHIAZIDE 37.5-25 MG (MAXZIDE-25) 37.5-25 MG PER TABLET    Take 1 tablet by mouth once daily.        Objective Findings:  Vital Signs:  /69   Pulse 80   Ht 5' 2" (1.575 m)   Wt 66.7 kg (147 lb)   SpO2 99%   BMI 26.89 kg/m²  Body mass index is 26.89 kg/m².    Physical Exam:  Physical Exam  Vitals and nursing note reviewed.   Constitutional:       General: She is not in acute distress.     Appearance: Normal appearance.   Eyes:      General: No scleral icterus.  Cardiovascular:      Rate and Rhythm: Normal rate and regular rhythm.      Heart sounds: No murmur heard.  Pulmonary:      Effort: Pulmonary effort is normal.      Breath sounds: No wheezing, rhonchi or rales.   Abdominal:      General: Bowel sounds are normal. There is no distension.      Palpations: Abdomen is soft. There is no mass.      Tenderness: There is no abdominal tenderness. There is no guarding or rebound.      " Hernia: No hernia is present.   Musculoskeletal:      Right lower leg: No edema.      Left lower leg: No edema.   Skin:     General: Skin is warm and dry.      Coloration: Skin is not jaundiced or pale.   Neurological:      Mental Status: She is alert and oriented to person, place, and time.   Psychiatric:         Mood and Affect: Mood normal.          Labs:  Lab Results   Component Value Date    WBC 5.83 01/12/2022    HGB 13.4 01/12/2022    HCT 41.1 01/12/2022    MCV 87.4 01/12/2022    RDW 12.9 01/12/2022     01/12/2022    LYMPH 31.4 01/12/2022    LYMPH 1.83 01/12/2022    MONO 8.9 (H) 01/12/2022    EOS 0.17 01/12/2022    BASO 0.10 01/12/2022     Lab Results   Component Value Date     01/12/2022    K 3.7 01/12/2022     01/12/2022    CO2 30 01/12/2022    GLU 91 01/12/2022    BUN 17 01/12/2022    CREATININE 0.81 01/12/2022    CALCIUM 9.7 01/12/2022    PROT 7.5 01/12/2022    ALBUMIN 4.2 01/12/2022    BILITOT 0.5 01/12/2022    ALKPHOS 87 01/12/2022    AST 26 01/12/2022    ALT 32 01/12/2022         Imaging: CT Abdomen Pelvis With Contrast    Result Date: 2/8/2022  EXAMINATION: CT ABDOMEN PELVIS WITH CONTRAST CLINICAL HISTORY: Abdominal pain, acute, nonlocalized; Lower abdominal pain, unspecified TECHNIQUE: Axial CT imaging of the abdomen and pelvis is performed with intravenous and oral contrast. Contrast dose is . CT dose reduction technique used - Dose Rite and tube current modulation. COMPARISON: None available FINDINGS: Cardiac and lung bases are within normal limits CT abdomen: The gallbladder has small dependent calcifications.  Small nonenhancing cyst right lobe of the liver estimated 1 cm.  Remaining liver, spleen, pancreas and adrenal glands are normal in size and enhancement.  No evidence of focal lesion is demonstrated in these solid organs. Kidneys are normal in size and enhancement.  No evidence of hydronephrosis or nephrolithiasis is seen. The bowel caliber is normal and no wall  thickening or adjacent inflammatory change is seen.  No evidence of free fluid or free air is present.  Appendix appears normal. CT pelvis: Multiple diverticula present the colon, no findings of diverticulitis.  Otherwise the pelvic bowel appears within normal limits.  Bladder shows no evidence of abnormality.  The uterus and ovaries are not identified.     Cholelithiasis.  Diverticulosis without findings of diverticulitis.  No other evidence of significant abnormality demonstrated. Electronically signed by: Rogelio Cordova Date:    02/08/2022 Time:    08:51    NM Hepatobiliary (HIDA) W Pharm and Ejec    Result Date: 2/15/2022  EXAMINATION: NM HEPATOBILIARY(HIDA) WITH PHARM AND EF CLINICAL HISTORY: Abdominal pain, upper, chronic, assess gallbladder motility; Upper abdominal pain, unspecified COMPARISON: None. FINDINGS: The patient was injected with 4.0 mCi of technetium 99m Choletec intravenously. There is prompt hepatic uptake and excretion into the biliary system. Gallbladder fills normally. The patient was given Ensure meal supplement. Gallbladder ejection fraction is estimated at 13 %.     Normal hepatobiliary uptake, excludes acute cholecystitis. Decreased gallbladder ejection fraction, can be seen from multiple causes of cholestasis. Electronically signed by: Rogelio Cordova Date:    02/15/2022 Time:    12:44        Assessment:  Jennifer Hale is a 77 y.o. female here with:  1. Gastroparesis    2. Dyskinesia of gallbladder    3. Hepatic cyst          Recommendations:  1. Advised small frequent meals. Low fat and protein at one time. Do not lay down for 3 hours after eating. Avoid raw fruits and vegetables  2. Keep appointment with Dr. Monique as scheduled  3. Follow up in 6 months with liver ultrasound    Follow up in about 6 months (around 9/9/2022).      Order summary:       Thank you for allowing me to participate in the care of Jennifer Hale.      LUZ Capone

## 2022-03-11 DIAGNOSIS — Z71.89 COMPLEX CARE COORDINATION: ICD-10-CM

## 2022-03-14 RX ORDER — TRIAMTERENE/HYDROCHLOROTHIAZID 37.5-25 MG
1 TABLET ORAL DAILY
Qty: 90 TABLET | Refills: 3 | Status: SHIPPED | OUTPATIENT
Start: 2022-03-14 | End: 2023-05-11

## 2022-03-14 NOTE — TELEPHONE ENCOUNTER
----- Message from Caitlin Hernandez sent at 3/11/2022  9:43 AM CST -----  Need refill on triamterene

## 2022-03-29 DIAGNOSIS — Z01.812 PRE-PROCEDURAL LABORATORY EXAMINATION: Primary | ICD-10-CM

## 2022-03-29 DIAGNOSIS — K80.20 CALCULUS OF GALLBLADDER WITHOUT CHOLECYSTITIS WITHOUT OBSTRUCTION: ICD-10-CM

## 2022-04-25 ENCOUNTER — HOSPITAL ENCOUNTER (OUTPATIENT)
Dept: RADIOLOGY | Facility: HOSPITAL | Age: 77
Discharge: HOME | End: 2022-04-25
Attending: STUDENT IN AN ORGANIZED HEALTH CARE EDUCATION/TRAINING PROGRAM
Payer: MEDICARE

## 2022-04-25 ENCOUNTER — CLINICAL SUPPORT (OUTPATIENT)
Dept: CARDIOLOGY | Facility: CLINIC | Age: 77
End: 2022-04-25
Payer: MEDICARE

## 2022-04-25 DIAGNOSIS — Z01.812 PRE-PROCEDURAL LABORATORY EXAMINATION: ICD-10-CM

## 2022-04-25 PROCEDURE — 71046 X-RAY EXAM CHEST 2 VIEWS: CPT | Mod: 26,,,

## 2022-04-25 PROCEDURE — 93005 ELECTROCARDIOGRAM TRACING: CPT | Mod: PBBFAC | Performed by: STUDENT IN AN ORGANIZED HEALTH CARE EDUCATION/TRAINING PROGRAM

## 2022-04-25 PROCEDURE — 93010 EKG 12-LEAD: ICD-10-PCS | Mod: S$PBB,,, | Performed by: STUDENT IN AN ORGANIZED HEALTH CARE EDUCATION/TRAINING PROGRAM

## 2022-04-25 PROCEDURE — 71046 X-RAY EXAM CHEST 2 VIEWS: CPT | Mod: TC

## 2022-04-25 PROCEDURE — 99212 OFFICE O/P EST SF 10 MIN: CPT | Mod: PBBFAC,25

## 2022-04-25 PROCEDURE — 93010 ELECTROCARDIOGRAM REPORT: CPT | Mod: S$PBB,,, | Performed by: STUDENT IN AN ORGANIZED HEALTH CARE EDUCATION/TRAINING PROGRAM

## 2022-04-25 PROCEDURE — 71046 XR CHEST PA AND LATERAL: ICD-10-PCS | Mod: 26,,,

## 2022-04-26 ENCOUNTER — OFFICE VISIT (OUTPATIENT)
Dept: INTERNAL MEDICINE | Facility: CLINIC | Age: 77
End: 2022-04-26
Payer: MEDICARE

## 2022-04-26 VITALS
OXYGEN SATURATION: 97 % | SYSTOLIC BLOOD PRESSURE: 118 MMHG | BODY MASS INDEX: 25.76 KG/M2 | HEART RATE: 73 BPM | HEIGHT: 62 IN | DIASTOLIC BLOOD PRESSURE: 72 MMHG | WEIGHT: 140 LBS

## 2022-04-26 DIAGNOSIS — E78.9 LIPID DISORDER: ICD-10-CM

## 2022-04-26 DIAGNOSIS — I10 ESSENTIAL HYPERTENSION: ICD-10-CM

## 2022-04-26 DIAGNOSIS — Z01.818 PREOP EXAMINATION: Primary | ICD-10-CM

## 2022-04-26 DIAGNOSIS — G47.33 OBSTRUCTIVE SLEEP APNEA: ICD-10-CM

## 2022-04-26 PROCEDURE — 99214 OFFICE O/P EST MOD 30 MIN: CPT | Mod: S$PBB,ICN,, | Performed by: INTERNAL MEDICINE

## 2022-04-26 PROCEDURE — 99214 PR OFFICE/OUTPT VISIT, EST, LEVL IV, 30-39 MIN: ICD-10-PCS | Mod: S$PBB,ICN,, | Performed by: INTERNAL MEDICINE

## 2022-04-26 PROCEDURE — 99214 OFFICE O/P EST MOD 30 MIN: CPT | Mod: PBBFAC | Performed by: INTERNAL MEDICINE

## 2022-04-26 NOTE — PROGRESS NOTES
Subjective:       Patient ID: Jennifer Hale is a 77 y.o. female.    Chief Complaint: Joint Swelling (Rt Ankle swelling)    The patient is a 77-year-old female the presents today for surgical preop risk stratification.  She has a history of dyslipidemia, obstructive sleep apnea, and hypertension.  She has also had some issues with gastroparesis in the past.  She recently had a HIDA scan which showed an abnormal function gallbladder.  Plans are for laparoscopic cholecystectomy.  No known history of coronary artery disease, cerebrovascular disease, CHF, or valvular heart disease.  She denies any chest pain at rest or with exertion.  Today she is resting comfortably in no distress.  She is afebrile and vital signs are stable.    Review of Systems   Constitutional: Negative for appetite change, chills, fatigue and fever.   HENT: Negative for nasal congestion, ear pain, hearing loss, sinus pressure/congestion and sore throat.    Eyes: Negative for pain, redness and visual disturbance.   Respiratory: Negative for apnea, cough, shortness of breath and wheezing.    Cardiovascular: Negative for chest pain and palpitations.   Gastrointestinal: Positive for abdominal pain and nausea. Negative for constipation and diarrhea.   Endocrine: Negative for cold intolerance, heat intolerance and polyuria.   Genitourinary: Negative for dysuria and hematuria.   Musculoskeletal: Negative for arthralgias, back pain, joint swelling, myalgias and neck pain.   Integumentary:  Negative for pallor, rash and wound.   Allergic/Immunologic: Negative for immunocompromised state.   Neurological: Negative for tremors, seizures, weakness, headaches and memory loss.   Hematological: Negative for adenopathy.   Psychiatric/Behavioral: Negative for confusion, dysphoric mood and sleep disturbance. The patient is not nervous/anxious.          Objective:      Physical Exam  Vitals and nursing note reviewed.   Constitutional:       General: She is not in acute  distress.     Appearance: Normal appearance. She is not ill-appearing.   HENT:      Head: Normocephalic and atraumatic.      Right Ear: External ear normal.      Left Ear: External ear normal.      Nose: Nose normal.      Mouth/Throat:      Pharynx: Oropharynx is clear.   Eyes:      Extraocular Movements: Extraocular movements intact.      Conjunctiva/sclera: Conjunctivae normal.      Pupils: Pupils are equal, round, and reactive to light.   Neck:      Vascular: No carotid bruit.   Cardiovascular:      Rate and Rhythm: Normal rate and regular rhythm.      Pulses: Normal pulses.      Heart sounds: Normal heart sounds. No murmur heard.  Pulmonary:      Effort: Pulmonary effort is normal. No respiratory distress.      Breath sounds: Normal breath sounds. No wheezing or rales.   Abdominal:      General: Bowel sounds are normal. There is no distension.      Palpations: Abdomen is soft.   Musculoskeletal:         General: Normal range of motion.      Cervical back: Normal range of motion and neck supple.      Right lower leg: No edema.      Left lower leg: No edema.   Skin:     General: Skin is warm and dry.      Capillary Refill: Capillary refill takes less than 2 seconds.      Coloration: Skin is not pale.   Neurological:      General: No focal deficit present.      Mental Status: She is alert and oriented to person, place, and time. Mental status is at baseline.      Cranial Nerves: No cranial nerve deficit.   Psychiatric:         Mood and Affect: Mood normal.         Judgment: Judgment normal.         Assessment:       Problem List Items Addressed This Visit        Cardiac/Vascular    Lipid disorder    Essential hypertension       Other    Obstructive sleep apnea      Other Visit Diagnoses     Preop examination    -  Primary          Plan:       1. Gallbladder disease- the patient presents today for surgical preop risk stratification.  Plan is for laparoscopic cholecystectomy.  No issues with anesthesia in the past.   No known history of coronary artery disease, cerebrovascular disease, CHF, or valvular heart disease.  She denies any chest pain at rest or with exertion.  She is able to perform greater than 4 metabolic equivalents without any symptoms.  Her revised cardiac risk index is class 1. Her ACS-SRC risk is 2.3% for series complications and 2.9% for any complication which is average.  Overall she is considered low risk for an intermediate risk procedure.  Okay to proceed to surgery without any further testing.    2. Essential hypertension-blood pressure is well controlled.  Is 118/72 today.  Continue home medications perioperatively    3. Dyslipidemia-she is on atorvastatin 10 mg daily    4. Obstructive sleep apnea-she uses a CPAP at night.  If plans are for the patient to be in the hospital overnight she should bring her CPAP with her    The patient lives alone and she is inquired about home health.  I have instructed her to touch base with Dr. Monique concerning this request

## 2022-05-04 DIAGNOSIS — K82.8 BILIARY DYSKINESIA: Primary | ICD-10-CM

## 2022-05-26 ENCOUNTER — ANESTHESIA (OUTPATIENT)
Dept: SURGERY | Facility: HOSPITAL | Age: 77
End: 2022-05-26
Payer: MEDICARE

## 2022-05-26 ENCOUNTER — HOSPITAL ENCOUNTER (OUTPATIENT)
Facility: HOSPITAL | Age: 77
Discharge: HOME | End: 2022-05-26
Attending: STUDENT IN AN ORGANIZED HEALTH CARE EDUCATION/TRAINING PROGRAM | Admitting: STUDENT IN AN ORGANIZED HEALTH CARE EDUCATION/TRAINING PROGRAM
Payer: MEDICARE

## 2022-05-26 ENCOUNTER — ANESTHESIA EVENT (OUTPATIENT)
Dept: SURGERY | Facility: HOSPITAL | Age: 77
End: 2022-05-26
Payer: MEDICARE

## 2022-05-26 VITALS
DIASTOLIC BLOOD PRESSURE: 55 MMHG | SYSTOLIC BLOOD PRESSURE: 149 MMHG | WEIGHT: 140 LBS | HEART RATE: 69 BPM | OXYGEN SATURATION: 95 % | RESPIRATION RATE: 16 BRPM | HEIGHT: 62 IN | BODY MASS INDEX: 25.76 KG/M2 | TEMPERATURE: 98 F

## 2022-05-26 DIAGNOSIS — K82.8 BILIARY DYSKINESIA: ICD-10-CM

## 2022-05-26 PROBLEM — K80.20 CALCULUS OF GALLBLADDER WITHOUT CHOLECYSTITIS WITHOUT OBSTRUCTION: Status: ACTIVE | Noted: 2022-05-26

## 2022-05-26 PROCEDURE — 47562 LAPAROSCOPIC CHOLECYSTECTOMY: CPT | Mod: ,,, | Performed by: STUDENT IN AN ORGANIZED HEALTH CARE EDUCATION/TRAINING PROGRAM

## 2022-05-26 PROCEDURE — 47562 PR LAP,CHOLECYSTECTOMY: ICD-10-PCS | Mod: ,,, | Performed by: STUDENT IN AN ORGANIZED HEALTH CARE EDUCATION/TRAINING PROGRAM

## 2022-05-26 PROCEDURE — 71000015 HC POSTOP RECOV 1ST HR: Performed by: STUDENT IN AN ORGANIZED HEALTH CARE EDUCATION/TRAINING PROGRAM

## 2022-05-26 PROCEDURE — 37000008 HC ANESTHESIA 1ST 15 MINUTES: Performed by: STUDENT IN AN ORGANIZED HEALTH CARE EDUCATION/TRAINING PROGRAM

## 2022-05-26 PROCEDURE — D9220A PRA ANESTHESIA: Mod: ANES,,, | Performed by: ANESTHESIOLOGY

## 2022-05-26 PROCEDURE — 27201423 OPTIME MED/SURG SUP & DEVICES STERILE SUPPLY: Performed by: STUDENT IN AN ORGANIZED HEALTH CARE EDUCATION/TRAINING PROGRAM

## 2022-05-26 PROCEDURE — 37000009 HC ANESTHESIA EA ADD 15 MINS: Performed by: STUDENT IN AN ORGANIZED HEALTH CARE EDUCATION/TRAINING PROGRAM

## 2022-05-26 PROCEDURE — D9220A PRA ANESTHESIA: Mod: CRNA,,, | Performed by: NURSE ANESTHETIST, CERTIFIED REGISTERED

## 2022-05-26 PROCEDURE — 36000709 HC OR TIME LEV III EA ADD 15 MIN: Performed by: STUDENT IN AN ORGANIZED HEALTH CARE EDUCATION/TRAINING PROGRAM

## 2022-05-26 PROCEDURE — 36000708 HC OR TIME LEV III 1ST 15 MIN: Performed by: STUDENT IN AN ORGANIZED HEALTH CARE EDUCATION/TRAINING PROGRAM

## 2022-05-26 PROCEDURE — 63600175 PHARM REV CODE 636 W HCPCS: Performed by: NURSE ANESTHETIST, CERTIFIED REGISTERED

## 2022-05-26 PROCEDURE — 63600175 PHARM REV CODE 636 W HCPCS: Performed by: ANESTHESIOLOGY

## 2022-05-26 PROCEDURE — 25000003 PHARM REV CODE 250: Performed by: NURSE ANESTHETIST, CERTIFIED REGISTERED

## 2022-05-26 PROCEDURE — D9220A PRA ANESTHESIA: ICD-10-PCS | Mod: ANES,,, | Performed by: ANESTHESIOLOGY

## 2022-05-26 PROCEDURE — 71000033 HC RECOVERY, INTIAL HOUR: Performed by: STUDENT IN AN ORGANIZED HEALTH CARE EDUCATION/TRAINING PROGRAM

## 2022-05-26 PROCEDURE — 25000003 PHARM REV CODE 250: Performed by: ANESTHESIOLOGY

## 2022-05-26 PROCEDURE — D9220A PRA ANESTHESIA: ICD-10-PCS | Mod: CRNA,,, | Performed by: NURSE ANESTHETIST, CERTIFIED REGISTERED

## 2022-05-26 PROCEDURE — 71000016 HC POSTOP RECOV ADDL HR: Performed by: STUDENT IN AN ORGANIZED HEALTH CARE EDUCATION/TRAINING PROGRAM

## 2022-05-26 RX ORDER — ROCURONIUM BROMIDE 10 MG/ML
INJECTION, SOLUTION INTRAVENOUS
Status: DISCONTINUED | OUTPATIENT
Start: 2022-05-26 | End: 2022-05-26

## 2022-05-26 RX ORDER — PROPOFOL 10 MG/ML
VIAL (ML) INTRAVENOUS
Status: DISCONTINUED | OUTPATIENT
Start: 2022-05-26 | End: 2022-05-26

## 2022-05-26 RX ORDER — LIDOCAINE HYDROCHLORIDE 20 MG/ML
INJECTION, SOLUTION EPIDURAL; INFILTRATION; INTRACAUDAL; PERINEURAL
Status: DISCONTINUED | OUTPATIENT
Start: 2022-05-26 | End: 2022-05-26

## 2022-05-26 RX ORDER — SODIUM CHLORIDE, SODIUM LACTATE, POTASSIUM CHLORIDE, CALCIUM CHLORIDE 600; 310; 30; 20 MG/100ML; MG/100ML; MG/100ML; MG/100ML
125 INJECTION, SOLUTION INTRAVENOUS CONTINUOUS
Status: DISCONTINUED | OUTPATIENT
Start: 2022-05-26 | End: 2022-05-26 | Stop reason: HOSPADM

## 2022-05-26 RX ORDER — DIPHENHYDRAMINE HYDROCHLORIDE 50 MG/ML
25 INJECTION INTRAMUSCULAR; INTRAVENOUS EVERY 6 HOURS PRN
Status: DISCONTINUED | OUTPATIENT
Start: 2022-05-26 | End: 2022-05-26 | Stop reason: HOSPADM

## 2022-05-26 RX ORDER — HYDROMORPHONE HYDROCHLORIDE 2 MG/ML
0.5 INJECTION, SOLUTION INTRAMUSCULAR; INTRAVENOUS; SUBCUTANEOUS EVERY 5 MIN PRN
Status: DISCONTINUED | OUTPATIENT
Start: 2022-05-26 | End: 2022-05-26 | Stop reason: HOSPADM

## 2022-05-26 RX ORDER — FENTANYL CITRATE 50 UG/ML
INJECTION, SOLUTION INTRAMUSCULAR; INTRAVENOUS
Status: DISCONTINUED | OUTPATIENT
Start: 2022-05-26 | End: 2022-05-26

## 2022-05-26 RX ORDER — CEFAZOLIN SODIUM 1 G/3ML
INJECTION, POWDER, FOR SOLUTION INTRAMUSCULAR; INTRAVENOUS
Status: DISCONTINUED | OUTPATIENT
Start: 2022-05-26 | End: 2022-05-26

## 2022-05-26 RX ORDER — EPHEDRINE SULFATE 50 MG/ML
INJECTION, SOLUTION INTRAVENOUS
Status: DISCONTINUED | OUTPATIENT
Start: 2022-05-26 | End: 2022-05-26

## 2022-05-26 RX ORDER — OXYCODONE HYDROCHLORIDE 5 MG/1
5 TABLET ORAL
Status: DISCONTINUED | OUTPATIENT
Start: 2022-05-26 | End: 2022-05-26 | Stop reason: HOSPADM

## 2022-05-26 RX ORDER — ONDANSETRON 2 MG/ML
INJECTION INTRAMUSCULAR; INTRAVENOUS
Status: DISCONTINUED | OUTPATIENT
Start: 2022-05-26 | End: 2022-05-26

## 2022-05-26 RX ORDER — DEXAMETHASONE SODIUM PHOSPHATE 4 MG/ML
INJECTION, SOLUTION INTRA-ARTICULAR; INTRALESIONAL; INTRAMUSCULAR; INTRAVENOUS; SOFT TISSUE
Status: DISCONTINUED | OUTPATIENT
Start: 2022-05-26 | End: 2022-05-26

## 2022-05-26 RX ORDER — MORPHINE SULFATE 8 MG/ML
4 INJECTION INTRAMUSCULAR; INTRAVENOUS; SUBCUTANEOUS EVERY 5 MIN PRN
Status: DISCONTINUED | OUTPATIENT
Start: 2022-05-26 | End: 2022-05-26 | Stop reason: HOSPADM

## 2022-05-26 RX ORDER — HYDROCODONE BITARTRATE AND ACETAMINOPHEN 5; 325 MG/1; MG/1
1 TABLET ORAL EVERY 6 HOURS PRN
Qty: 15 TABLET | Refills: 0 | Status: SHIPPED | OUTPATIENT
Start: 2022-05-26 | End: 2022-06-02

## 2022-05-26 RX ORDER — DOCUSATE SODIUM 100 MG/1
100 CAPSULE, LIQUID FILLED ORAL 2 TIMES DAILY
Qty: 28 CAPSULE | Refills: 0 | Status: SHIPPED | OUTPATIENT
Start: 2022-05-26 | End: 2023-06-12

## 2022-05-26 RX ORDER — SODIUM CHLORIDE, SODIUM LACTATE, POTASSIUM CHLORIDE, CALCIUM CHLORIDE 600; 310; 30; 20 MG/100ML; MG/100ML; MG/100ML; MG/100ML
INJECTION, SOLUTION INTRAVENOUS CONTINUOUS
Status: DISCONTINUED | OUTPATIENT
Start: 2022-05-26 | End: 2022-05-26 | Stop reason: HOSPADM

## 2022-05-26 RX ORDER — CEFAZOLIN SODIUM 2 G/50ML
2 SOLUTION INTRAVENOUS
Status: DISCONTINUED | OUTPATIENT
Start: 2022-05-26 | End: 2022-05-26 | Stop reason: HOSPADM

## 2022-05-26 RX ORDER — MEPERIDINE HYDROCHLORIDE 25 MG/ML
25 INJECTION INTRAMUSCULAR; INTRAVENOUS; SUBCUTANEOUS EVERY 10 MIN PRN
Status: DISCONTINUED | OUTPATIENT
Start: 2022-05-26 | End: 2022-05-26 | Stop reason: HOSPADM

## 2022-05-26 RX ORDER — ONDANSETRON 2 MG/ML
4 INJECTION INTRAMUSCULAR; INTRAVENOUS DAILY PRN
Status: DISCONTINUED | OUTPATIENT
Start: 2022-05-26 | End: 2022-05-26 | Stop reason: HOSPADM

## 2022-05-26 RX ADMIN — CEFAZOLIN 2 G: 1 INJECTION, POWDER, FOR SOLUTION INTRAMUSCULAR; INTRAVENOUS; PARENTERAL at 03:05

## 2022-05-26 RX ADMIN — OXYCODONE HYDROCHLORIDE 5 MG: 5 TABLET ORAL at 06:05

## 2022-05-26 RX ADMIN — DEXAMETHASONE SODIUM PHOSPHATE 10 MG: 4 INJECTION, SOLUTION INTRA-ARTICULAR; INTRALESIONAL; INTRAMUSCULAR; INTRAVENOUS; SOFT TISSUE at 03:05

## 2022-05-26 RX ADMIN — PROPOFOL 140 MG: 10 INJECTION, EMULSION INTRAVENOUS at 03:05

## 2022-05-26 RX ADMIN — FENTANYL CITRATE 100 MCG: 50 INJECTION INTRAMUSCULAR; INTRAVENOUS at 03:05

## 2022-05-26 RX ADMIN — ONDANSETRON 8 MG: 2 INJECTION INTRAMUSCULAR; INTRAVENOUS at 03:05

## 2022-05-26 RX ADMIN — SODIUM CHLORIDE, POTASSIUM CHLORIDE, SODIUM LACTATE AND CALCIUM CHLORIDE: 600; 310; 30; 20 INJECTION, SOLUTION INTRAVENOUS at 10:05

## 2022-05-26 RX ADMIN — EPHEDRINE SULFATE 50 MG: 50 INJECTION INTRAVENOUS at 03:05

## 2022-05-26 RX ADMIN — MORPHINE SULFATE 4 MG: 8 INJECTION INTRAVENOUS at 05:05

## 2022-05-26 RX ADMIN — LIDOCAINE HYDROCHLORIDE 80 MG: 20 INJECTION, SOLUTION EPIDURAL; INFILTRATION; INTRACAUDAL; PERINEURAL at 03:05

## 2022-05-26 RX ADMIN — SUGAMMADEX 200 MG: 100 INJECTION, SOLUTION INTRAVENOUS at 04:05

## 2022-05-26 RX ADMIN — MORPHINE SULFATE 4 MG: 8 INJECTION INTRAVENOUS at 04:05

## 2022-05-26 RX ADMIN — ROCURONIUM BROMIDE 40 MG: 10 INJECTION, SOLUTION INTRAVENOUS at 03:05

## 2022-05-26 NOTE — ANESTHESIA PREPROCEDURE EVALUATION
05/26/2022  Jennifer Hale is a 77 y.o., female.      Pre-op Assessment    I have reviewed the Patient Summary Reports.     I have reviewed the Nursing Notes. I have reviewed the NPO Status.   I have reviewed the Medications.     Review of Systems  Anesthesia Hx:  No problems with previous Anesthesia    Social:  Non-Smoker, No Alcohol Use    Hematology/Oncology:  Hematology Normal   Oncology Normal     EENT/Dental:EENT/Dental Normal   Cardiovascular:   Hypertension hyperlipidemia    Pulmonary:   Sleep Apnea, CPAP    Renal/:  Renal/ Normal     Hepatic/GI:   GERD    Musculoskeletal:  Musculoskeletal Normal    Neurological:  Neurology Normal    Endocrine:  Endocrine Normal    Dermatological:  Skin Normal    Psych:  Psychiatric Normal           Physical Exam  General: Well nourished    Airway:  Mallampati: III / III  Mouth Opening: Normal  TM Distance: > 6 cm  Tongue: Normal  Neck ROM: Normal ROM    Chest/Lungs:  Clear to auscultation, Normal Respiratory Rate    Heart:  Rate: Normal  Rhythm: Regular Rhythm        Anesthesia Plan  Type of Anesthesia, risks & benefits discussed:    Anesthesia Type: Gen ETT  Intra-op Monitoring Plan: Standard ASA Monitors  Post Op Pain Control Plan: multimodal analgesia  Induction:  IV  Informed Consent: Informed consent signed with the Patient and all parties understand the risks and agree with anesthesia plan.  All questions answered.   ASA Score: 3  Day of Surgery Review of History & Physical: H&P Update referred to the surgeon/provider.I have interviewed and examined the patient. I have reviewed the patient's H&P dated: There are no significant changes. H&P completed by Anesthesiologist.    Ready For Surgery From Anesthesia Perspective.     .

## 2022-05-26 NOTE — SUBJECTIVE & OBJECTIVE
No current facility-administered medications on file prior to encounter.     Current Outpatient Medications on File Prior to Encounter   Medication Sig    aspirin (ECOTRIN) 81 MG EC tablet Take 81 mg by mouth every other day.    atorvastatin (LIPITOR) 10 MG tablet Take 1 tablet (10 mg total) by mouth once daily.    calcium citrate-vitamin D3 315-200 mg (CITRACAL+D) 315 mg-5 mcg (200 unit) per tablet Take 1 tablet by mouth once daily.    multivitamin capsule Take 1 capsule by mouth once daily.    triamterene-hydrochlorothiazide 37.5-25 mg (MAXZIDE-25) 37.5-25 mg per tablet Take 1 tablet by mouth once daily.    estradioL (ESTRACE) 0.01 % (0.1 mg/gram) vaginal cream Place 1 g vaginally once daily.    tolterodine (DETROL LA) 4 MG 24 hr capsule Take 1 capsule (4 mg total) by mouth once daily. (Patient not taking: Reported on 1/12/2022)       Review of patient's allergies indicates:   Allergen Reactions    Sulfa (sulfonamide antibiotics)        Past Medical History:   Diagnosis Date    Basal cell carcinoma     Cardiac murmur     GERD (gastroesophageal reflux disease)     Hyperlipidemia     Hypertension      Past Surgical History:   Procedure Laterality Date    basil cell carcinoma      left cheek    HYSTERECTOMY      TEAR DUCT SURGERY      right eye    TOTAL ABDOMINAL HYSTERECTOMY W/ BILATERAL SALPINGOOPHORECTOMY  01/18/2000     Family History       Problem Relation (Age of Onset)    Colon cancer Maternal Grandfather    Hypertension Mother    Lymphoma Mother, Father, Sister, Brother          Tobacco Use    Smoking status: Never Smoker    Smokeless tobacco: Never Used   Substance and Sexual Activity    Alcohol use: Yes     Comment: only occas    Drug use: Never    Sexual activity: Not Currently     Comment:      Review of Systems   Constitutional: Negative.  Negative for fatigue and unexpected weight change.   HENT: Negative.  Negative for trouble swallowing.    Eyes: Negative.    Respiratory: Negative.  Negative  for chest tightness and shortness of breath.    Cardiovascular: Negative.  Negative for chest pain.   Gastrointestinal: Negative.  Negative for abdominal pain, blood in stool and nausea.   Endocrine: Negative.    Genitourinary: Negative.  Negative for hematuria.   Musculoskeletal: Negative.  Negative for back pain and myalgias.   Neurological: Negative.  Negative for dizziness, speech difficulty, weakness and light-headedness.   Psychiatric/Behavioral: Negative.  Negative for agitation and behavioral problems.    Objective:     Vital Signs (Most Recent):  Temp: 98.5 °F (36.9 °C) (05/26/22 0711)  Pulse: (!) 57 (05/26/22 0711)  Resp: 16 (05/26/22 0711)  BP: 135/72 (05/26/22 0711)  SpO2: 99 % (05/26/22 0711)   Vital Signs (24h Range):  Temp:  [98.5 °F (36.9 °C)] 98.5 °F (36.9 °C)  Pulse:  [57] 57  Resp:  [16] 16  SpO2:  [99 %] 99 %  BP: (135)/(72) 135/72     Weight: 63.5 kg (140 lb)  Body mass index is 25.61 kg/m².    Physical Exam  Constitutional:       General: She is not in acute distress.     Appearance: Normal appearance.   HENT:      Head: Normocephalic and atraumatic.      Nose: Nose normal.   Eyes:      General: No scleral icterus.     Pupils: Pupils are equal, round, and reactive to light.   Cardiovascular:      Rate and Rhythm: Normal rate.   Pulmonary:      Effort: Pulmonary effort is normal. No respiratory distress.      Breath sounds: Normal breath sounds.   Abdominal:      General: There is no distension.      Palpations: Abdomen is soft.      Tenderness: There is no abdominal tenderness. There is no guarding.   Musculoskeletal:         General: Normal range of motion.      Cervical back: Normal range of motion and neck supple.   Skin:     General: Skin is warm.      Coloration: Skin is not jaundiced.   Neurological:      General: No focal deficit present.      Mental Status: She is alert and oriented to person, place, and time.      Cranial Nerves: No cranial nerve deficit.   Psychiatric:         Mood  and Affect: Mood normal.       Significant Labs:  I have reviewed all pertinent lab results within the past 24 hours.  CBC:   Recent Labs   Lab 05/24/22  0818   WBC 4.41*   RBC 4.81   HGB 13.9   HCT 41.4      MCV 86.1   MCH 28.9   MCHC 33.6     BMP:   Recent Labs   Lab 05/24/22  0818   GLU 83      K 4.2      CO2 30   BUN 12   CREATININE 0.80   CALCIUM 9.4       Significant Diagnostics:  I have reviewed all pertinent imaging results/findings within the past 24 hours.

## 2022-05-26 NOTE — H&P
[facility] Services  General Surgery  History & Physical    Patient Name: Jennifer Hale  MRN: [id number]  Admission Date: 5/26/2022  Attending Physician: Gil Monique DO   Primary Care Provider: Gagan Chavira DO    Patient information was obtained from patient and ER records.     Subjective:     Chief Complaint/Reason for Admission:  Biliary colic    History of Present Illness: 77-year-old  female presents to the OR for laparoscopic cholecystectomy.  History of diverticulosis/diverticulitis and peptic ulcer disease; worked up with gastroenterology.  Found to have gallstones and HIDA scan showed biliary dyskinesia.  Continue to have some biliary symptoms.  Patient was seen by Dr. Chavira and deemed an appropriate risk for the surgery.  Denies any chest pain/shortness of breath, nausea/vomiting/diarrhea, fever/chills.      No current facility-administered medications on file prior to encounter.     Current Outpatient Medications on File Prior to Encounter   Medication Sig    aspirin (ECOTRIN) 81 MG EC tablet Take 81 mg by mouth every other day.    atorvastatin (LIPITOR) 10 MG tablet Take 1 tablet (10 mg total) by mouth once daily.    calcium citrate-vitamin D3 315-200 mg (CITRACAL+D) 315 mg-5 mcg (200 unit) per tablet Take 1 tablet by mouth once daily.    multivitamin capsule Take 1 capsule by mouth once daily.    triamterene-hydrochlorothiazide 37.5-25 mg (MAXZIDE-25) 37.5-25 mg per tablet Take 1 tablet by mouth once daily.    estradioL (ESTRACE) 0.01 % (0.1 mg/gram) vaginal cream Place 1 g vaginally once daily.    tolterodine (DETROL LA) 4 MG 24 hr capsule Take 1 capsule (4 mg total) by mouth once daily. (Patient not taking: Reported on 1/12/2022)       Review of patient's allergies indicates:   Allergen Reactions    Sulfa (sulfonamide antibiotics)        Past Medical History:   Diagnosis Date    Basal cell carcinoma     Cardiac murmur     GERD (gastroesophageal reflux  disease)     Hyperlipidemia     Hypertension      Past Surgical History:   Procedure Laterality Date    basil cell carcinoma      left cheek    HYSTERECTOMY      TEAR DUCT SURGERY      right eye    TOTAL ABDOMINAL HYSTERECTOMY W/ BILATERAL SALPINGOOPHORECTOMY  01/18/2000     Family History       Problem Relation (Age of Onset)    Colon cancer Maternal Grandfather    Hypertension Mother    Lymphoma Mother, Father, Sister, Brother          Tobacco Use    Smoking status: Never Smoker    Smokeless tobacco: Never Used   Substance and Sexual Activity    Alcohol use: Yes     Comment: only occas    Drug use: Never    Sexual activity: Not Currently     Comment:      Review of Systems   Constitutional: Negative.  Negative for fatigue and unexpected weight change.   HENT: Negative.  Negative for trouble swallowing.    Eyes: Negative.    Respiratory: Negative.  Negative for chest tightness and shortness of breath.    Cardiovascular: Negative.  Negative for chest pain.   Gastrointestinal: Negative.  Negative for abdominal pain, blood in stool and nausea.   Endocrine: Negative.    Genitourinary: Negative.  Negative for hematuria.   Musculoskeletal: Negative.  Negative for back pain and myalgias.   Neurological: Negative.  Negative for dizziness, speech difficulty, weakness and light-headedness.   Psychiatric/Behavioral: Negative.  Negative for agitation and behavioral problems.    Objective:     Vital Signs (Most Recent):  Temp: 98.5 °F (36.9 °C) (05/26/22 0711)  Pulse: (!) 57 (05/26/22 0711)  Resp: 16 (05/26/22 0711)  BP: 135/72 (05/26/22 0711)  SpO2: 99 % (05/26/22 0711)   Vital Signs (24h Range):  Temp:  [98.5 °F (36.9 °C)] 98.5 °F (36.9 °C)  Pulse:  [57] 57  Resp:  [16] 16  SpO2:  [99 %] 99 %  BP: (135)/(72) 135/72     Weight: 63.5 kg (140 lb)  Body mass index is 25.61 kg/m².    Physical Exam  Constitutional:       General: She is not in acute distress.     Appearance: Normal appearance.   HENT:      Head:  Normocephalic and atraumatic.      Nose: Nose normal.   Eyes:      General: No scleral icterus.     Pupils: Pupils are equal, round, and reactive to light.   Cardiovascular:      Rate and Rhythm: Normal rate.   Pulmonary:      Effort: Pulmonary effort is normal. No respiratory distress.      Breath sounds: Normal breath sounds.   Abdominal:      General: There is no distension.      Palpations: Abdomen is soft.      Tenderness: There is no abdominal tenderness. There is no guarding.   Musculoskeletal:         General: Normal range of motion.      Cervical back: Normal range of motion and neck supple.   Skin:     General: Skin is warm.      Coloration: Skin is not jaundiced.   Neurological:      General: No focal deficit present.      Mental Status: She is alert and oriented to person, place, and time.      Cranial Nerves: No cranial nerve deficit.   Psychiatric:         Mood and Affect: Mood normal.       Significant Labs:  I have reviewed all pertinent lab results within the past 24 hours.  CBC:   Recent Labs   Lab 05/24/22  0818   WBC 4.41*   RBC 4.81   HGB 13.9   HCT 41.4      MCV 86.1   MCH 28.9   MCHC 33.6     BMP:   Recent Labs   Lab 05/24/22  0818   GLU 83      K 4.2      CO2 30   BUN 12   CREATININE 0.80   CALCIUM 9.4       Significant Diagnostics:  I have reviewed all pertinent imaging results/findings within the past 24 hours.      Assessment/Plan:     * Calculus of gallbladder without cholecystitis without obstruction  To the OR for laparoscopic cholecystectomy, possible open.    Risks and benefits explained with the patient including risks for infection, bleeding, injury to surrounding structures, hematoma/seroma formation with need for possible evacuation, possible open.  The patient verbalized understanding, agrees and wishes to proceed with surgery.      VTE Risk Mitigation (From admission, onward)         Ordered     IP VTE LOW RISK PATIENT  Once         05/26/22 0649     Place  sequential compression device  Until discontinued         05/26/22 0649                Gil Ward, DO  General Surgery  [facility] - Periop Services

## 2022-05-26 NOTE — OP NOTE
[facility] - Periop Services  Surgery Department  Operative Note    SUMMARY     Date of Procedure: 5/26/2022     Procedure: Procedure(s) (LRB):  CHOLECYSTECTOMY, LAPAROSCOPIC (N/A)     Surgeon(s) and Role:     * iGl Monique DO - Primary    Assisting Surgeon: None    Pre-Operative Diagnosis: Biliary dyskinesia [K82.8]    Post-Operative Diagnosis: Post-Op Diagnosis Codes:     * Biliary dyskinesia [K82.8]    Anesthesia: General    Operative Findings (including complications, if any): none     Description of Technical Procedures:  Patient was taken the operating room and placed on the operating table in supine position.  Patient underwent general anesthesia per the anesthesia team.  The abdomen was prepped and draped in usual sterile fashion.  A stab incision was made left upper quadrant a Veress needle was inserted and the abdomen was insufflated to 15 mmHg; patient tolerated insufflation well.  A 5 mm trocars inserted at the umbilicus under visualization with the laparoscoped; no injuries identified.  An additional 11 mm trocar was placed in the subxiphoid region followed by 2 5 mm trocars in the right upper quadrant; all done under visualization.  The gallbladder was grasped and retracted cephalad.  We dissected out the cystic duct and cystic artery and obtained a critical view with the liver bed behind these 2 structures.  We doubly clipped and ligated and transected the structures and then took the gallbladder off the liver with electrocautery; controlling bleeding as we went.  We had no Endo-Catch bag available, thus we grasped the gallbladder with the laparoscopic LayHe and removed from the subxiphoid port; we did have to stretch the fascia; we got the gallbladder out and sent to pathology.  The fascia was approximated with 0 Vicryl via Gigi-Lawrence under visualization.  We suctioned and irrigated till clear, bleeding controlled electrocautery.  The abdomen was then desufflated and trocars removed.   Skin incisions were approximated with 4-0 Monocryl deep dermal suture.  Skin was cleaned and dried, Mastisol Steri-Strips applied.  Patient was awakened, extubated taken to PACU in stable condition; patient tolerated procedure well        Estimated Blood Loss (EBL): 10 cc           Implants: * No implants in log *    Specimens:   Specimen (24h ago, onward)             Start     Ordered    05/26/22 1547  Surgical Pathology  RELEASE UPON ORDERING         05/26/22 1547                        Condition: Good    Disposition: PACU - hemodynamically stable.    Attestation: I was present and scrubbed for the entire procedure.

## 2022-05-26 NOTE — OR NURSING
1631 PT TO PACU ASLEEP, AROUSABLE TO TOUCH AND VOICE. RESP EVEN AND UNLABORED, ON ROOM AIR. VSS. LAP SITES X4 TO ABD C/D/I. SCD'S IN PLACE. IV INFUSING. SAFETY MEASURES ONGOING.     1635 PT PLACED ON 2L NC.     1655 PT C/O PAIN 8/10. MORPHINE GIVEN IV, SEE MAR.    1701 PT STATES PAIN STILL 8/10, MORPHINE GIVEN IV. SEE MAR.     1710 PT STATES PAIN IMPROVED TO 6/10. PT RESTING QUIETLY WITH EYES CLOSED.     1718 PT OUT OF PACU    1725 PT TRANSFERRED TO ASC 3, RELEASED TO TAMMIE PAEZ RN. PT RESTING QUIETLY WITH EYES CLOSED. RESP EVEN AND UNLABORED. LAP SITES X4 TO ABD C/D/I. VS: /67, HR 70, RR 12, SAO2 95% ON 2L NC. FAMILY AT BEDSIDE.

## 2022-05-26 NOTE — ANESTHESIA PROCEDURE NOTES
Intubation    Date/Time: 5/26/2022 3:15 PM  Performed by: Derick Loyola CRNA  Authorized by: Miguel Angel Pink MD     Intubation:     Induction:  Intravenous    Intubated:  Postinduction    Mask Ventilation:  Easy mask    Attempts:  1    Attempted By:  CRNA    Method of Intubation:  Direct    Blade:  Mario 3    Laryngeal View Grade: Grade IIA - cords partially seen      Difficult Airway Encountered?: No      Complications:  None    Airway Device:  Oral endotracheal tube    Airway Device Size:  7.0    Style/Cuff Inflation:  Cuffed (inflated to minimal occlusive pressure)    Inflation Amount (mL):  7    Tube secured:  21    Placement Verified By:  Capnometry    Complicating Factors:  None    Findings Post-Intubation:  BS equal bilateral and atraumatic/condition of teeth unchanged

## 2022-05-26 NOTE — DISCHARGE INSTRUCTIONS
SEE TEACHING SHEETS FOR FURTHER INSTRUCTIONS. AMBROCIO HOSE REMOVE TWICE PER DAY FOR ONE HOUR EACH TIME AND CONTINUE TO WEAR UNTIL DR'S APPOINTMENT.

## 2022-05-26 NOTE — ASSESSMENT & PLAN NOTE
To the OR for laparoscopic cholecystectomy, possible open.    Risks and benefits explained with the patient including risks for infection, bleeding, injury to surrounding structures, hematoma/seroma formation with need for possible evacuation, possible open.  The patient verbalized understanding, agrees and wishes to proceed with surgery.

## 2022-05-26 NOTE — TRANSFER OF CARE
"Anesthesia Transfer of Care Note    Patient: Jennifer Hale    Procedure(s) Performed: Procedure(s) (LRB):  CHOLECYSTECTOMY, LAPAROSCOPIC (N/A)    Patient location: PACU    Anesthesia Type: general    Transport from OR: Transported from OR on room air with adequate spontaneous ventilation    Post pain: adequate analgesia    Post assessment: no apparent anesthetic complications    Post vital signs: stable    Level of consciousness: responds to stimulation and oriented    Nausea/Vomiting: no nausea/vomiting    Complications: none    Transfer of care protocol was followed      Last vitals:   Visit Vitals  BP (!) 166/77   Pulse 94   Temp 36.8 °C (98.2 °F) (Oral)   Resp 15   Ht 5' 2" (1.575 m)   Wt 63.5 kg (140 lb)   SpO2 (!) 93%   Breastfeeding No   BMI 25.61 kg/m²     "

## 2022-05-26 NOTE — HPI
77-year-old  female presents to the OR for laparoscopic cholecystectomy.  History of diverticulosis/diverticulitis and peptic ulcer disease; worked up with gastroenterology.  Found to have gallstones and HIDA scan showed biliary dyskinesia.  Continue to have some biliary symptoms.  Patient was seen by Dr. Chavira and deemed an appropriate risk for the surgery.  Denies any chest pain/shortness of breath, nausea/vomiting/diarrhea, fever/chills.

## 2022-05-27 LAB
ESTROGEN SERPL-MCNC: NORMAL PG/ML
INSULIN SERPL-ACNC: NORMAL U[IU]/ML
LAB AP GROSS DESCRIPTION: NORMAL
LAB AP LABORATORY NOTES: NORMAL
T3RU NFR SERPL: NORMAL %

## 2022-05-27 NOTE — ANESTHESIA POSTPROCEDURE EVALUATION
Anesthesia Post Evaluation    Patient: Jennifer Hale    Procedure(s) Performed: Procedure(s) (LRB):  CHOLECYSTECTOMY, LAPAROSCOPIC (N/A)    Final Anesthesia Type: general      Patient location during evaluation: PACU  Post-procedure vital signs: reviewed and stable  Pain management: adequate  Airway patency: patent  JENAE mitigation strategies: Verification of full reversal of neuromuscular block  PONV status at discharge: No PONV  Anesthetic complications: no      Cardiovascular status: hemodynamically stable  Respiratory status: unassisted  Hydration status: euvolemic  Follow-up not needed.          Vitals Value Taken Time   /55 05/26/22 1902   Temp 36.8 °C (98.2 °F) 05/26/22 1635   Pulse 69 05/26/22 1915   Resp 16 05/26/22 1915   SpO2 95 % 05/26/22 1915         Event Time   Out of Recovery 17:18:00         Pain/Irineo Score: Pain Rating Prior to Med Admin: 5 (5/26/2022  6:01 PM)  Pain Rating Post Med Admin: 6 (5/26/2022  5:15 PM)  Irineo Score: 8 (5/26/2022  5:15 PM)

## 2022-06-14 ENCOUNTER — OFFICE VISIT (OUTPATIENT)
Dept: SURGERY | Facility: CLINIC | Age: 77
End: 2022-06-14
Payer: MEDICARE

## 2022-06-14 DIAGNOSIS — K80.20 CALCULUS OF GALLBLADDER WITHOUT CHOLECYSTITIS WITHOUT OBSTRUCTION: Primary | ICD-10-CM

## 2022-06-14 PROCEDURE — 99024 PR POST-OP FOLLOW-UP VISIT: ICD-10-PCS | Mod: ,,, | Performed by: STUDENT IN AN ORGANIZED HEALTH CARE EDUCATION/TRAINING PROGRAM

## 2022-06-14 PROCEDURE — 99024 POSTOP FOLLOW-UP VISIT: CPT | Mod: ,,, | Performed by: STUDENT IN AN ORGANIZED HEALTH CARE EDUCATION/TRAINING PROGRAM

## 2022-06-14 PROCEDURE — 99213 OFFICE O/P EST LOW 20 MIN: CPT | Mod: PBBFAC | Performed by: STUDENT IN AN ORGANIZED HEALTH CARE EDUCATION/TRAINING PROGRAM

## 2022-06-14 NOTE — PROGRESS NOTES
Subjective:       Patient ID: Jennifer Hale is a 77 y.o. female.    Chief Complaint: Follow-up (Lap elizabeth)    77-year-old  female presents the clinic for postop evaluation status post laparoscopic cholecystectomy.  Patient doing well, no complaints.  Denies any chest pain/shortness of breath, nausea/vomiting/diarrhea, fever/chills.    Review of Systems   Constitutional: Negative.    HENT: Negative.    Eyes: Negative.    Respiratory: Negative for shortness of breath.    Cardiovascular: Negative for chest pain.   Gastrointestinal: Negative for abdominal distention, abdominal pain, blood in stool, change in bowel habit and change in bowel habit.   Genitourinary: Negative.  Negative for hematuria.   Musculoskeletal: Negative for myalgias.   Neurological: Negative for dizziness and weakness.   Psychiatric/Behavioral: Negative.          Objective:      Physical Exam  Constitutional:       General: She is not in acute distress.     Appearance: Normal appearance.   HENT:      Head: Normocephalic.   Cardiovascular:      Rate and Rhythm: Normal rate.   Pulmonary:      Effort: Pulmonary effort is normal. No respiratory distress.   Abdominal:      General: There is no distension.      Tenderness: There is no abdominal tenderness.   Musculoskeletal:         General: Normal range of motion.   Skin:     General: Skin is warm.      Coloration: Skin is not jaundiced.      Comments: Abdominal incisions clean dry and intact and healing well, no signs of infection   Neurological:      General: No focal deficit present.      Mental Status: She is alert and oriented to person, place, and time.      Cranial Nerves: No cranial nerve deficit.         Assessment:       Problem List Items Addressed This Visit        GI    Calculus of gallbladder without cholecystitis without obstruction - Primary          Plan:       Patient doing well status post laparoscopic cholecystectomy.  No need for further follow-up.  Pathology showed chronic  cholecystitis with cholelithiasis

## 2022-08-02 ENCOUNTER — OFFICE VISIT (OUTPATIENT)
Dept: DERMATOLOGY | Facility: CLINIC | Age: 77
End: 2022-08-02
Payer: MEDICARE

## 2022-08-02 VITALS — HEIGHT: 62 IN | BODY MASS INDEX: 25.76 KG/M2 | WEIGHT: 140 LBS | RESPIRATION RATE: 18 BRPM

## 2022-08-02 DIAGNOSIS — L82.1 SEBORRHEIC KERATOSES: ICD-10-CM

## 2022-08-02 DIAGNOSIS — D22.9 MULTIPLE BENIGN NEVI: ICD-10-CM

## 2022-08-02 DIAGNOSIS — D18.01 CHERRY ANGIOMA: ICD-10-CM

## 2022-08-02 DIAGNOSIS — Z85.828 HISTORY OF NONMELANOMA SKIN CANCER: Primary | ICD-10-CM

## 2022-08-02 PROCEDURE — 99213 OFFICE O/P EST LOW 20 MIN: CPT | Mod: ,,, | Performed by: STUDENT IN AN ORGANIZED HEALTH CARE EDUCATION/TRAINING PROGRAM

## 2022-08-02 PROCEDURE — 99213 PR OFFICE/OUTPT VISIT, EST, LEVL III, 20-29 MIN: ICD-10-PCS | Mod: ,,, | Performed by: STUDENT IN AN ORGANIZED HEALTH CARE EDUCATION/TRAINING PROGRAM

## 2022-08-26 ENCOUNTER — OFFICE VISIT (OUTPATIENT)
Dept: FAMILY MEDICINE | Facility: CLINIC | Age: 77
End: 2022-08-26
Payer: MEDICARE

## 2022-08-26 VITALS
TEMPERATURE: 98 F | HEIGHT: 63 IN | WEIGHT: 140 LBS | BODY MASS INDEX: 24.8 KG/M2 | DIASTOLIC BLOOD PRESSURE: 95 MMHG | OXYGEN SATURATION: 97 % | HEART RATE: 74 BPM | RESPIRATION RATE: 20 BRPM | SYSTOLIC BLOOD PRESSURE: 160 MMHG

## 2022-08-26 DIAGNOSIS — Z11.52 ENCOUNTER FOR SCREENING FOR COVID-19: Primary | ICD-10-CM

## 2022-08-26 DIAGNOSIS — U07.1 COVID-19 VIRUS INFECTION: ICD-10-CM

## 2022-08-26 LAB
CTP QC/QA: YES
SARS-COV-2 AG RESP QL IA.RAPID: POSITIVE

## 2022-08-26 PROCEDURE — 99213 OFFICE O/P EST LOW 20 MIN: CPT | Mod: CR,,, | Performed by: FAMILY MEDICINE

## 2022-08-26 PROCEDURE — 99213 PR OFFICE/OUTPT VISIT, EST, LEVL III, 20-29 MIN: ICD-10-PCS | Mod: CR,,, | Performed by: FAMILY MEDICINE

## 2022-08-26 PROCEDURE — 87426 SARSCOV CORONAVIRUS AG IA: CPT | Mod: RHCUB | Performed by: FAMILY MEDICINE

## 2022-08-26 RX ORDER — ASCORBIC ACID 500 MG
TABLET ORAL
COMMUNITY
Start: 2022-05-11

## 2022-08-26 RX ORDER — ACETAMINOPHEN 500 MG
TABLET ORAL
COMMUNITY
Start: 2022-05-11

## 2022-08-26 NOTE — PROGRESS NOTES
Subjective:       Patient ID: Jennifer Hale is a 77 y.o. female.    Chief Complaint: COVID-19 Concerns (Positive home covid test, cough, sneezing, sore throat)    Mild symptoms.  No shortness of breath.  Fully vaccinated    Review of Systems      Objective:      Physical Exam  Constitutional:       Appearance: Normal appearance. She is not ill-appearing.   HENT:      Nose: Congestion present.      Mouth/Throat:      Pharynx: No posterior oropharyngeal erythema.   Cardiovascular:      Rate and Rhythm: Normal rate and regular rhythm.   Pulmonary:      Effort: Pulmonary effort is normal.      Breath sounds: Normal breath sounds.   Neurological:      Mental Status: She is alert.         Assessment:       Problem List Items Addressed This Visit    None     Visit Diagnoses     Encounter for screening for COVID-19    -  Primary    Relevant Orders    SARS Coronavirus 2 Antigen, POCT          Plan:         go to emergency room for any significant shortness of breath

## 2022-09-20 ENCOUNTER — OFFICE VISIT (OUTPATIENT)
Dept: DERMATOLOGY | Facility: CLINIC | Age: 77
End: 2022-09-20
Payer: MEDICARE

## 2022-09-20 VITALS — HEIGHT: 62 IN | BODY MASS INDEX: 25.76 KG/M2 | WEIGHT: 140 LBS

## 2022-09-20 DIAGNOSIS — L82.1 SEBORRHEIC KERATOSES: Primary | ICD-10-CM

## 2022-09-20 DIAGNOSIS — D22.9 MULTIPLE BENIGN NEVI: ICD-10-CM

## 2022-09-20 PROCEDURE — 99213 PR OFFICE/OUTPT VISIT, EST, LEVL III, 20-29 MIN: ICD-10-PCS | Mod: ,,, | Performed by: STUDENT IN AN ORGANIZED HEALTH CARE EDUCATION/TRAINING PROGRAM

## 2022-09-20 PROCEDURE — 99213 OFFICE O/P EST LOW 20 MIN: CPT | Mod: ,,, | Performed by: STUDENT IN AN ORGANIZED HEALTH CARE EDUCATION/TRAINING PROGRAM

## 2022-10-09 DIAGNOSIS — Z71.89 COMPLEX CARE COORDINATION: ICD-10-CM

## 2022-10-19 ENCOUNTER — HOSPITAL ENCOUNTER (OUTPATIENT)
Dept: RADIOLOGY | Facility: HOSPITAL | Age: 77
Discharge: HOME | End: 2022-10-19
Attending: ORTHOPAEDIC SURGERY
Payer: MEDICARE

## 2022-10-19 DIAGNOSIS — M25.561 RIGHT KNEE PAIN, UNSPECIFIED CHRONICITY: ICD-10-CM

## 2022-10-19 PROBLEM — M23.91 INTERNAL DERANGEMENT OF RIGHT KNEE: Status: ACTIVE | Noted: 2022-10-19

## 2022-10-19 PROCEDURE — 73564 X-RAY EXAM KNEE 4 OR MORE: CPT | Mod: TC,RT

## 2022-11-07 RX ORDER — ATORVASTATIN CALCIUM 10 MG/1
TABLET, FILM COATED ORAL
Qty: 90 TABLET | Refills: 3 | Status: SHIPPED | OUTPATIENT
Start: 2022-11-07 | End: 2024-01-25

## 2023-01-25 ENCOUNTER — OFFICE VISIT (OUTPATIENT)
Dept: ORTHOPEDICS | Facility: CLINIC | Age: 78
End: 2023-01-25
Payer: MEDICARE

## 2023-01-25 DIAGNOSIS — M25.562 CHRONIC PAIN OF BOTH KNEES: Primary | ICD-10-CM

## 2023-01-25 DIAGNOSIS — G89.29 CHRONIC PAIN OF BOTH KNEES: Primary | ICD-10-CM

## 2023-01-25 DIAGNOSIS — M25.561 CHRONIC PAIN OF BOTH KNEES: Primary | ICD-10-CM

## 2023-01-25 PROCEDURE — 20610 DRAIN/INJ JOINT/BURSA W/O US: CPT | Mod: PBBFAC | Performed by: NURSE PRACTITIONER

## 2023-01-25 PROCEDURE — 99213 OFFICE O/P EST LOW 20 MIN: CPT | Mod: PBBFAC,25 | Performed by: NURSE PRACTITIONER

## 2023-01-25 PROCEDURE — 99213 PR OFFICE/OUTPT VISIT, EST, LEVL III, 20-29 MIN: ICD-10-PCS | Mod: S$PBB,25,, | Performed by: NURSE PRACTITIONER

## 2023-01-25 PROCEDURE — 99213 OFFICE O/P EST LOW 20 MIN: CPT | Mod: S$PBB,25,, | Performed by: NURSE PRACTITIONER

## 2023-01-25 PROCEDURE — 20610 DRAIN/INJ JOINT/BURSA W/O US: CPT | Mod: PBBFAC,RT | Performed by: NURSE PRACTITIONER

## 2023-01-25 PROCEDURE — 20610 LARGE JOINT ASPIRATION/INJECTION: R SUPRA PATELLAR BURSA: ICD-10-PCS | Mod: S$PBB,RT,, | Performed by: NURSE PRACTITIONER

## 2023-01-25 RX ADMIN — TRIAMCINOLONE ACETONIDE 40 MG: 40 INJECTION, SUSPENSION INTRA-ARTICULAR; INTRAMUSCULAR at 10:01

## 2023-01-25 NOTE — PROGRESS NOTES
ASSESSMENT:      ICD-10-CM ICD-9-CM   1. Chronic pain of both knees  M25.561 719.46    M25.562 338.29    G89.29        PLAN:     Findings and treatment options were discussed with the patient regarding the diagnosis.   All questions were answered regarding Jennifer Hale 's painful knee.      Natural history and expected course discussed. Questions answered. Educational materials distributed. Reduction in offending activity. OTC analgesics as needed. Arthrocentesis. See procedure note.  Right knee injection today  OTC ibuprofen  RTC in 4 weeks with Dr Starr    There are no Patient Instructions on file for this visit.    IMAGING:   No results found.       CC: Knee pain    78 y.o. Female who presents as a new patient to me for evaluation of  bilateral knee pain.    She has seen Dr. Nicolas in the past. She never followed up with him after she chose not to have surgery to fix the meniscus.   When she decided to make a follow up the appointment was made with Dr. Starr.   Occupation: retired  Pain has been present for for several months.  She has pain with walking and climbing stairs.  Injury description She believes she injured her knee while taking the  out of the pool.   She was told by Dr. Nicolas after her MRI that she has a meniscus tear in her right knee.   Mechanical symptoms, such as clicking, locking, and popping are not present.    Swelling and effusions  are not present.   The patient does not  describe symptoms of knee instability, such as the knee buckling and the knee giving way.   Symptoms are worsened with activity.  Better with rest. Treatment thus far has included rest, activity modifications, and oral medications.    she  has had formal physical therapy.  she has not had prior injections injections into the knee.   she has had previous advanced imaging such as MRI. Right knee    Here today to discuss diagnosis and treatment options.          REVIEW OF SYSTEMS:   Constitution: Negative.  Negative for chills, fever and night sweats.    Hematologic/Lymphatic: Negative for bleeding problem. Does not bruise/bleed easily.   Skin: Negative for dry skin, itching and rash.   Musculoskeletal: Negative for falls. Positive for knee pain and muscle weakness.     All other review of symptoms were reviewed and found to be noncontributory.     PAST MEDICAL HISTORY:   Past Medical History:   Diagnosis Date    Basal cell carcinoma     Cardiac murmur     GERD (gastroesophageal reflux disease)     Hyperlipidemia     Hypertension        PAST SURGICAL HISTORY:   Past Surgical History:   Procedure Laterality Date    basil cell carcinoma      left cheek    HYSTERECTOMY      LAPAROSCOPIC CHOLECYSTECTOMY N/A 5/26/2022    Procedure: CHOLECYSTECTOMY, LAPAROSCOPIC;  Surgeon: Gil Monique DO;  Location: [facility];  Service: General;  Laterality: N/A;    TEAR DUCT SURGERY      right eye    TOTAL ABDOMINAL HYSTERECTOMY W/ BILATERAL SALPINGOOPHORECTOMY  01/18/2000       FAMILY HISTORY:   Family History   Problem Relation Age of Onset    Lymphoma Mother     Hypertension Mother     Lymphoma Father     Lymphoma Sister     Lymphoma Brother     Colon cancer Maternal Grandfather        SOCIAL HISTORY:   Social History     Socioeconomic History    Marital status:    Tobacco Use    Smoking status: Never    Smokeless tobacco: Never   Substance and Sexual Activity    Alcohol use: Yes     Comment: only occas    Drug use: Never    Sexual activity: Not Currently     Comment:        MEDICATIONS:     Current Outpatient Medications:     ascorbic acid, vitamin C, (VITAMIN C) 500 MG tablet, Vitamin C 500 MG Oral Tablet QTY: 0 tablet Days: 0 Refills: 0  Written: 05/11/22 Patient Instructions:, Disp: , Rfl:     aspirin (ECOTRIN) 81 MG EC tablet, Take 81 mg by mouth every other day., Disp: , Rfl:     atorvastatin (LIPITOR) 10 MG tablet, TAKE 1 TABLET(10 MG) BY MOUTH EVERY DAY, Disp: 90 tablet, Rfl: 3     calcium citrate-vitamin D3 315-200 mg (CITRACAL+D) 315 mg-5 mcg (200 unit) per tablet, Take 1 tablet by mouth once daily., Disp: , Rfl:     cholecalciferol, vitamin D3, (VITAMIN D3) 50 mcg (2,000 unit) Cap capsule, Vitamin D3 50 MCG (2000 UT) Oral Capsule QTY: 0 capsule Days: 0 Refills: 0  Written: 05/11/22 Patient Instructions:, Disp: , Rfl:     docusate sodium (COLACE) 100 MG capsule, Take 1 capsule (100 mg total) by mouth 2 (two) times daily., Disp: 28 capsule, Rfl: 0    estradioL (ESTRACE) 0.01 % (0.1 mg/gram) vaginal cream, Place 1 g vaginally once daily., Disp: 42.5 g, Rfl: 1    multivitamin capsule, Take 1 capsule by mouth once daily., Disp: , Rfl:     triamterene-hydrochlorothiazide 37.5-25 mg (MAXZIDE-25) 37.5-25 mg per tablet, Take 1 tablet by mouth once daily., Disp: 90 tablet, Rfl: 3    ALLERGIES:   Review of patient's allergies indicates:   Allergen Reactions    Sulfa (sulfonamide antibiotics)         PHYSICAL EXAMINATION:    General    Constitutional: She is oriented to person, place, and time. She appears well-nourished.   HENT:   Head: Normocephalic and atraumatic.   Eyes: Pupils are equal, round, and reactive to light.   Neck: Neck supple.   Cardiovascular:  Normal rate and regular rhythm.            Pulmonary/Chest: Effort normal. No respiratory distress.   Abdominal: There is no abdominal tenderness. There is no guarding.   Neurological: She is alert and oriented to person, place, and time. She has normal reflexes.   Psychiatric: She has a normal mood and affect. Her behavior is normal. Judgment and thought content normal.           Right Knee Exam     Inspection   Swelling: present  Effusion: present    Tenderness   The patient is tender to palpation of the medial joint line and lateral joint line.    Crepitus   The patient has crepitus of the patella.    Range of Motion   Extension:  normal   Flexion:  abnormal     Tests   Meniscus   Wei:  Medial - positive   Ligament Examination    Lachman: normal (-1 to 2mm)   PCL-Posterior Drawer: normal (0 to 2mm)     Patella   Patellar Tracking: normal  Patellar Grind: positive    Other   Sensation: normal    Muscle Strength   Right Lower Extremity   Quadriceps:  5/5   Hamstrin/5     Reflexes     Right Side   Achilles:  2+  Quadriceps:  2+    Vascular Exam     Right Pulses  Dorsalis Pedis:      2+  Posterior Tibial:      2+          No orders of the defined types were placed in this encounter.      Large Joint Aspiration/Injection: R supra patellar bursa    Date/Time: 2023 10:30 AM  Performed by: TAMIKA Brandon  Authorized by: TAMIKA Brandon     Consent Done?:  Yes (Verbal)  Indications:  Pain  Site marked: the procedure site was marked    Local anesthetic:  Bupivacaine 0.25% without epinephrine    Details:  Needle Size:  22 G  Location:  Knee  Site:  R supra patellar bursa  Medications:  40 mg triamcinolone acetonide 40 mg/mL  Patient tolerance:  Patient tolerated the procedure well with no immediate complications

## 2023-02-07 ENCOUNTER — OFFICE VISIT (OUTPATIENT)
Dept: DERMATOLOGY | Facility: CLINIC | Age: 78
End: 2023-02-07
Payer: MEDICARE

## 2023-02-07 DIAGNOSIS — L82.1 SEBORRHEIC KERATOSES: ICD-10-CM

## 2023-02-07 DIAGNOSIS — Z85.828 HISTORY OF NONMELANOMA SKIN CANCER: Primary | ICD-10-CM

## 2023-02-07 DIAGNOSIS — D23.9 DERMATOFIBROMA: ICD-10-CM

## 2023-02-07 DIAGNOSIS — D18.01 CHERRY ANGIOMA: ICD-10-CM

## 2023-02-07 PROCEDURE — 99213 PR OFFICE/OUTPT VISIT, EST, LEVL III, 20-29 MIN: ICD-10-PCS | Mod: ,,, | Performed by: STUDENT IN AN ORGANIZED HEALTH CARE EDUCATION/TRAINING PROGRAM

## 2023-02-07 PROCEDURE — 99213 OFFICE O/P EST LOW 20 MIN: CPT | Mod: ,,, | Performed by: STUDENT IN AN ORGANIZED HEALTH CARE EDUCATION/TRAINING PROGRAM

## 2023-02-07 NOTE — PROGRESS NOTES
[facility]  Ronn Sheikh MD    [facility] [address], [place] [address]  [phone]    Fax: [phone]    Patient Name: Jennifer Hale  Medical Record Number: [id number]  PCP: Gagan Chavira DO  Age: 78 y.o. : 1945  Contact: [phone] (home)     History of Present Illness:     Jennifer Hale is a 78 y.o.  female here for follow up of multiple benign lesion (SK, cherry angiomas, and benign nevi). She does have a history of skin cancer (BCC left cheek removed  in [place]). Today, she is complaining of a skin lesions on the lower back.     The patient has no other concerns today.    Review of Systems:     Unremarkable other than mentioned above.     Physical Exam:     General: Relaxed, oriented, alert    Skin examination of the scalp, face, neck, chest, back, abdomen, upper extremities and lower extremities were normal except for as listed below      Assessment and Plan:     1. Seborrheic keratoses   - brown stuck on appearing papules/plaques  - patient educated on benign nature. No treatment necessary unless they become irritated or inflamed       2. History of NMSC   Well-healed scar on L cheek  No e/o recurrence   Recommend sunscreen and good photoprotection     3. Cherry Angiomas  - Scattered bright pink papules    Plan: Counseling  I counseled the patient regarding the diagnosis including that cherry angiomas are benign skin growths requiring no treatment. Patients get more as they age.    4. Dermatofibroma   - dome-shaped pink-tan nodule with positive dimple sign on the right thigh    Plan: Reassure  Dermatofibromas are benign. They should be surgically removed if symptomatic or if they grow.      Return to clinic in 1 year.     AVS printed with patient instructions     Ronn Sheikh MD   Mohs Surgery/Dermatologic Oncology  Dermatology

## 2023-03-29 RX ORDER — TRIAMCINOLONE ACETONIDE 40 MG/ML
40 INJECTION, SUSPENSION INTRA-ARTICULAR; INTRAMUSCULAR
Status: DISCONTINUED | OUTPATIENT
Start: 2023-01-25 | End: 2023-03-29 | Stop reason: HOSPADM

## 2023-04-13 ENCOUNTER — OFFICE VISIT (OUTPATIENT)
Dept: DERMATOLOGY | Facility: CLINIC | Age: 78
End: 2023-04-13
Payer: MEDICARE

## 2023-04-13 DIAGNOSIS — Z85.828 HISTORY OF NONMELANOMA SKIN CANCER: ICD-10-CM

## 2023-04-13 DIAGNOSIS — R23.8 INFLAMMATORY PAPULE: Primary | ICD-10-CM

## 2023-04-13 PROCEDURE — 99213 OFFICE O/P EST LOW 20 MIN: CPT | Mod: ,,, | Performed by: STUDENT IN AN ORGANIZED HEALTH CARE EDUCATION/TRAINING PROGRAM

## 2023-04-13 PROCEDURE — 99213 PR OFFICE/OUTPT VISIT, EST, LEVL III, 20-29 MIN: ICD-10-PCS | Mod: ,,, | Performed by: STUDENT IN AN ORGANIZED HEALTH CARE EDUCATION/TRAINING PROGRAM

## 2023-04-13 NOTE — PROGRESS NOTES
[facility]  Ronn Sheikh MD    [address] [place] [address], [place] [address]  [phone]    Fax: [phone]    Patient Name: Jennifer Hale  Medical Record Number: [id number]  PCP: Gagan Chavira DO  Age: 78 y.o. : 1945  Contact: [phone] (home)     History of Present Illness:     Jennifer Hale is a 78 y.o.  female here for follow up of multiple benign lesions that required no treatment. Today, she complains of a red skin lesion of her nose that is tender. This has been present for 2 days.      The patient has no other concerns today.    Review of Systems:     Unremarkable other than mentioned above.     Physical Exam:     General: Relaxed, oriented, alert    Skin examination of the scalp, face, neck, chest, back, abdomen, upper extremities and lower extremities were normal except for as listed below      Assessment and Plan:     1. History of NMSC   Well-healed scar on L cheek   No e/o recurrence   Recommend sunscreen and good photoprotection     2. Inflammatory papule of nose  - benign assured, advised monitoring      Return to clinic in 1 year.     AVS printed with patient instructions     Ronn Sheikh MD   Mohs Surgery/Dermatologic Oncology  Dermatology

## 2023-04-25 ENCOUNTER — TELEPHONE (OUTPATIENT)
Dept: GASTROENTEROLOGY | Facility: CLINIC | Age: 78
End: 2023-04-25
Payer: MEDICARE

## 2023-04-25 ENCOUNTER — OFFICE VISIT (OUTPATIENT)
Dept: ORTHOPEDICS | Facility: CLINIC | Age: 78
End: 2023-04-25
Payer: MEDICARE

## 2023-04-25 DIAGNOSIS — M25.561 RIGHT KNEE PAIN, UNSPECIFIED CHRONICITY: Primary | ICD-10-CM

## 2023-04-25 DIAGNOSIS — M23.203 OTHER OLD TEAR OF MEDIAL MENISCUS OF RIGHT KNEE: ICD-10-CM

## 2023-04-25 PROCEDURE — 99213 PR OFFICE/OUTPT VISIT, EST, LEVL III, 20-29 MIN: ICD-10-PCS | Mod: S$PBB,,, | Performed by: ORTHOPAEDIC SURGERY

## 2023-04-25 PROCEDURE — 99213 OFFICE O/P EST LOW 20 MIN: CPT | Mod: S$PBB,,, | Performed by: ORTHOPAEDIC SURGERY

## 2023-04-25 PROCEDURE — 99213 OFFICE O/P EST LOW 20 MIN: CPT | Mod: PBBFAC | Performed by: ORTHOPAEDIC SURGERY

## 2023-04-25 NOTE — TELEPHONE ENCOUNTER
Returned patient called and patient stated she was at another 's office and would like a call back.

## 2023-04-25 NOTE — PROGRESS NOTES
CC:  Knee pain    78 y.o. Female returns to clinic for a follow up visit regarding knee pain.       Patient states that her knees are feeling good at this time.   She states that at night she still feels pain sometimes, but the pain is not longer constant and she is able to walk up stairs now.     She received a steroid injection in her right knee on 1/25/2023.        Past Medical History:   Diagnosis Date    Basal cell carcinoma     Cardiac murmur     GERD (gastroesophageal reflux disease)     Hyperlipidemia     Hypertension      Past Surgical History:   Procedure Laterality Date    basil cell carcinoma      left cheek    HYSTERECTOMY      LAPAROSCOPIC CHOLECYSTECTOMY N/A 5/26/2022    Procedure: CHOLECYSTECTOMY, LAPAROSCOPIC;  Surgeon: Gil Monique DO;  Location: [facility];  Service: General;  Laterality: N/A;    TEAR DUCT SURGERY      right eye    TOTAL ABDOMINAL HYSTERECTOMY W/ BILATERAL SALPINGOOPHORECTOMY  01/18/2000         PHYSICAL EXAMINATION:  There were no vitals taken for this visit.  General    Constitutional: She is oriented to person, place, and time. She appears well-developed and well-nourished.   HENT:   Head: Normocephalic and atraumatic.   Nose: Nose normal.   Eyes: EOM are normal. Pupils are equal, round, and reactive to light.   Cardiovascular:  Normal rate and intact distal pulses.            Pulmonary/Chest: Effort normal. No respiratory distress. She exhibits no tenderness.   Abdominal: Soft. She exhibits no distension. There is no abdominal tenderness.   Neurological: She is alert and oriented to person, place, and time. She has normal reflexes.   Psychiatric: She has a normal mood and affect. Her behavior is normal. Judgment and thought content normal.           Right Knee Exam     Inspection   Swelling: present  Deformity: absent    Tenderness   The patient is tender to palpation of the medial retinaculum and medial joint line.    Crepitus   The patient has crepitus of the medial  joint line and medial plica.    Range of Motion   Flexion:  abnormal     Tests   Meniscus   Wei:  Medial - positive   Ligament Examination   Lachman: normal (-1 to 2mm)   MCL - Valgus: normal (0 to 2mm)  LCL - Varus: normal  Dial Test at 30 degrees: normal (< 5 degrees)  Posterolateral Corner: unstable (>15 degrees difference)  Patella   Patellar apprehension: negative  Patellar Grind: positive    Other   Sensation: normal    Comments:  Pain with Thessaly Maneuver    Left Knee Exam   Left knee exam is normal.    Muscle Strength   Right Lower Extremity   Quadriceps:  5/5   Hamstrin/5     Reflexes     Right Side   Achilles:  2+  Quadriceps:  2+    Vascular Exam     Right Pulses  Dorsalis Pedis:      2+  Posterior Tibial:      2+          IMAGING:  No results found.   I reviewed her MRI from the imaging center which demonstrates a small horizontal cleavage tear of the posterior horn of the medial meniscus.  The remainder of the knee appears to have no significant pathology  ASSESSMENT:      ICD-10-CM ICD-9-CM   1. Right knee pain, unspecified chronicity  M25.561 719.46   2. Other old tear of medial meniscus of right knee  M23.203 717.3       PLAN:     -Findings and treatment options were discussed with the patient  -All questions answered  Natural history and expected course discussed. Questions answered.  Educational materials distributed.  No need to consider any surgical intervention-- doing very well following an injection. Will follow up as needed. May require additional injections in the future    There are no Patient Instructions on file for this visit.      No orders of the defined types were placed in this encounter.        Procedures

## 2023-05-09 DIAGNOSIS — Z71.89 COMPLEX CARE COORDINATION: ICD-10-CM

## 2023-05-11 RX ORDER — TRIAMTERENE/HYDROCHLOROTHIAZID 37.5-25 MG
TABLET ORAL
Qty: 90 TABLET | Refills: 1 | Status: SHIPPED | OUTPATIENT
Start: 2023-05-11

## 2023-06-12 ENCOUNTER — OFFICE VISIT (OUTPATIENT)
Dept: SURGERY | Facility: CLINIC | Age: 78
End: 2023-06-12
Attending: SURGERY
Payer: MEDICARE

## 2023-06-12 ENCOUNTER — OFFICE VISIT (OUTPATIENT)
Dept: FAMILY MEDICINE | Facility: CLINIC | Age: 78
End: 2023-06-12
Payer: MEDICARE

## 2023-06-12 VITALS
TEMPERATURE: 98 F | DIASTOLIC BLOOD PRESSURE: 70 MMHG | BODY MASS INDEX: 24.73 KG/M2 | SYSTOLIC BLOOD PRESSURE: 118 MMHG | HEART RATE: 72 BPM | RESPIRATION RATE: 18 BRPM | WEIGHT: 134.38 LBS | HEIGHT: 62 IN | OXYGEN SATURATION: 99 %

## 2023-06-12 DIAGNOSIS — R59.0 ABDOMINAL LYMPHADENOPATHY: Primary | ICD-10-CM

## 2023-06-12 DIAGNOSIS — E87.6 HYPOKALEMIA: ICD-10-CM

## 2023-06-12 DIAGNOSIS — R59.0 ABDOMINAL LYMPHADENOPATHY: ICD-10-CM

## 2023-06-12 PROCEDURE — 99214 OFFICE O/P EST MOD 30 MIN: CPT | Mod: PBBFAC | Performed by: SURGERY

## 2023-06-12 PROCEDURE — 99214 PR OFFICE/OUTPT VISIT, EST, LEVL IV, 30-39 MIN: ICD-10-PCS | Mod: S$PBB,,, | Performed by: SURGERY

## 2023-06-12 PROCEDURE — 99213 PR OFFICE/OUTPT VISIT, EST, LEVL III, 20-29 MIN: ICD-10-PCS | Mod: ,,, | Performed by: FAMILY MEDICINE

## 2023-06-12 PROCEDURE — 99214 OFFICE O/P EST MOD 30 MIN: CPT | Mod: S$PBB,,, | Performed by: SURGERY

## 2023-06-12 PROCEDURE — 99213 OFFICE O/P EST LOW 20 MIN: CPT | Mod: ,,, | Performed by: FAMILY MEDICINE

## 2023-06-12 RX ORDER — LEVOTHYROXINE SODIUM 25 UG/1
25 TABLET ORAL
COMMUNITY
End: 2023-06-12 | Stop reason: CLARIF

## 2023-06-12 RX ORDER — POTASSIUM CHLORIDE 750 MG/1
10 TABLET, EXTENDED RELEASE ORAL DAILY
Qty: 90 TABLET | Refills: 1 | Status: SHIPPED | OUTPATIENT
Start: 2023-06-12 | End: 2024-02-11

## 2023-06-12 RX ORDER — ATORVASTATIN CALCIUM 10 MG/1
10 TABLET, FILM COATED ORAL DAILY
Qty: 90 TABLET | Refills: 3 | Status: CANCELLED | OUTPATIENT
Start: 2023-06-12

## 2023-06-12 NOTE — PROGRESS NOTES
Jennifer Hale is a 78 y.o. female seen today for her initial visit.  Patient is here for workup for abdominal lymphadenopathy.  She presents with a CT scan report where there is a 1.5 by 2.4 centimeter lymph node between the IVC and the aorta.  There also several 1.2 centimeter mesenteric lymph nodes present.  This patient has a strong family history of lymphoma an obvious is very concerned.  Patient's CBC was basically within normal limits although her potassium was slightly depressed.  We discussed adding a low-dose of potassium chloride to her regimen.  Patient denies any abnormal weight loss nausea vomiting abnormal itching or night sweats.  Patient has had loose stools since her cholecystectomy and we discussed cutting back on fatty content foods as well as decreasing intake of difficult to digest foods such as lettuce.      Past Medical History:   Diagnosis Date    Basal cell carcinoma     Cardiac murmur     GERD (gastroesophageal reflux disease)     Hyperlipidemia     Hypertension      Family History   Problem Relation Age of Onset    Lymphoma Mother     Hypertension Mother     Lymphoma Father     Lymphoma Sister     Lymphoma Brother     Colon cancer Maternal Grandfather      Current Outpatient Medications on File Prior to Visit   Medication Sig Dispense Refill    ascorbic acid, vitamin C, (VITAMIN C) 500 MG tablet Vitamin C 500 MG Oral Tablet QTY: 0 tablet Days: 0 Refills: 0  Written: 05/11/22 Patient Instructions:      atorvastatin (LIPITOR) 10 MG tablet TAKE 1 TABLET(10 MG) BY MOUTH EVERY DAY 90 tablet 3    calcium citrate-vitamin D3 315-200 mg (CITRACAL+D) 315 mg-5 mcg (200 unit) per tablet Take 1 tablet by mouth once daily.      cholecalciferol, vitamin D3, (VITAMIN D3) 50 mcg (2,000 unit) Cap capsule Vitamin D3 50 MCG (2000 UT) Oral Capsule QTY: 0 capsule Days: 0 Refills: 0  Written: 05/11/22 Patient Instructions:      estradioL (ESTRACE) 0.01 % (0.1 mg/gram) vaginal cream Place 1 g vaginally once  daily. 42.5 g 1    levothyroxine (SYNTHROID) 25 MCG tablet Take 25 mcg by mouth before breakfast.      multivitamin capsule Take 1 capsule by mouth once daily.      pregabalin (LYRICA ORAL) Take by mouth.      triamterene-hydrochlorothiazide 37.5-25 mg (MAXZIDE-25) 37.5-25 mg per tablet TAKE 1 TABLET BY MOUTH EVERY DAY 90 tablet 1    aspirin (ECOTRIN) 81 MG EC tablet Take 81 mg by mouth every other day.      [DISCONTINUED] docusate sodium (COLACE) 100 MG capsule Take 1 capsule (100 mg total) by mouth 2 (two) times daily. 28 capsule 0    [DISCONTINUED] tolterodine (DETROL LA) 4 MG 24 hr capsule Take 1 capsule (4 mg total) by mouth once daily. (Patient not taking: Reported on 1/12/2022) 30 capsule 2     No current facility-administered medications on file prior to visit.     Immunization History   Administered Date(s) Administered    COVID-19, MRNA, LN-S, PF (MODERNA FULL 0.5 ML DOSE) 03/01/2021, 11/05/2021, 07/01/2022    COVID-19, mRNA, LNP-S, bivalent booster, PF (Moderna Omicron) 12/16/2022    Influenza 10/01/2021       Review of Systems   Constitutional:  Negative for chills, diaphoresis, fever, malaise/fatigue and weight loss.   Respiratory:  Negative for shortness of breath.    Cardiovascular:  Negative for chest pain and palpitations.   Gastrointestinal:  Positive for diarrhea. Negative for blood in stool, nausea and vomiting.   Skin:  Negative for itching.   Psychiatric/Behavioral:  Negative for depression.       Vitals:    06/12/23 0843   BP: 118/70   Pulse: 72   Resp: 18   Temp: 97.9 °F (36.6 °C)       Physical Exam  Vitals reviewed.   Constitutional:       Appearance: Normal appearance.   HENT:      Head: Normocephalic.   Eyes:      Extraocular Movements: Extraocular movements intact.      Conjunctiva/sclera: Conjunctivae normal.      Pupils: Pupils are equal, round, and reactive to light.   Neck:      Thyroid: No thyroid mass or thyromegaly.   Cardiovascular:      Rate and Rhythm: Normal rate and regular  rhythm.      Heart sounds: Normal heart sounds. No murmur heard.    No gallop.   Pulmonary:      Effort: Pulmonary effort is normal. No respiratory distress.      Breath sounds: Normal breath sounds. No wheezing or rales.   Abdominal:      General: Bowel sounds are normal.      Palpations: Abdomen is soft. There is no mass.      Tenderness: There is no abdominal tenderness.   Skin:     General: Skin is warm and dry.      Coloration: Skin is not jaundiced or pale.   Neurological:      Mental Status: She is alert.   Psychiatric:         Mood and Affect: Mood normal.         Behavior: Behavior normal.         Thought Content: Thought content normal.         Judgment: Judgment normal.        Assessment and Plan  Abdominal lymphadenopathy  -     Ambulatory referral/consult to General Surgery; Future; Expected date: 06/19/2023            Return to clinic in 2 weeks to recheck her potassium or as needed.  She has been set up for general surgery evaluation this afternoon.    Health Maintenance Topics with due status: Not Due       Topic Last Completion Date    Lipid Panel 04/27/2021    Influenza Vaccine 10/01/2021

## 2023-06-13 NOTE — PROGRESS NOTES
General Surgery History and Physical      Patient ID: Jennifer Hale is a 78 y.o. female.    Chief Complaint: Lump      HPI:  78-year-old female referred to me for lymphadenopathy.  Patient was having some mild vague abdominal pain and a little bulge from her previous trocar incision done by another physician and went to the ER and CT scan showed some iliac and periaortic subcentimeter lymphadenopathy.  Patient also describes having about a 5 lb weight loss.  She denies any fever no chills and currently she has none of the abdominal pain.  She is a very strong family history of lymphoma and she was very concerned about it.  Last abdominal operation was about a year ago where she had her gallbladder taken out.      Review of Systems   Constitutional:  Positive for unexpected weight change. Negative for activity change, appetite change, chills, fatigue and fever.   HENT:  Negative for trouble swallowing.    Respiratory:  Negative for cough and shortness of breath.    Cardiovascular:  Negative for chest pain and palpitations.   Gastrointestinal:  Positive for abdominal pain. Negative for abdominal distention, blood in stool, constipation and diarrhea.   Genitourinary:  Negative for flank pain.   Musculoskeletal:  Negative for neck pain and neck stiffness.   Neurological:  Negative for weakness.     Current Outpatient Medications   Medication Sig Dispense Refill    ascorbic acid, vitamin C, (VITAMIN C) 500 MG tablet Vitamin C 500 MG Oral Tablet QTY: 0 tablet Days: 0 Refills: 0  Written: 05/11/22 Patient Instructions:      aspirin (ECOTRIN) 81 MG EC tablet Take 81 mg by mouth every other day.      atorvastatin (LIPITOR) 10 MG tablet TAKE 1 TABLET(10 MG) BY MOUTH EVERY DAY 90 tablet 3    calcium citrate-vitamin D3 315-200 mg (CITRACAL+D) 315 mg-5 mcg (200 unit) per tablet Take 1 tablet by mouth once daily.      cholecalciferol,  vitamin D3, (VITAMIN D3) 50 mcg (2,000 unit) Cap capsule Vitamin D3 50 MCG (2000 UT) Oral Capsule QTY: 0 capsule Days: 0 Refills: 0  Written: 05/11/22 Patient Instructions:      estradioL (ESTRACE) 0.01 % (0.1 mg/gram) vaginal cream Place 1 g vaginally once daily. 42.5 g 1    multivitamin capsule Take 1 capsule by mouth once daily.      potassium chloride SA (K-DUR,KLOR-CON M) 10 MEQ tablet Take 1 tablet (10 mEq total) by mouth once daily. 90 tablet 1    triamterene-hydrochlorothiazide 37.5-25 mg (MAXZIDE-25) 37.5-25 mg per tablet TAKE 1 TABLET BY MOUTH EVERY DAY 90 tablet 1     No current facility-administered medications for this visit.       Review of patient's allergies indicates:   Allergen Reactions    Sulfa (sulfonamide antibiotics) Hives       Past Medical History:   Diagnosis Date    Basal cell carcinoma     Cardiac murmur     GERD (gastroesophageal reflux disease)     Hyperlipidemia     Hypertension        Past Surgical History:   Procedure Laterality Date    basil cell carcinoma      left cheek    HYSTERECTOMY      LAPAROSCOPIC CHOLECYSTECTOMY N/A 5/26/2022    Procedure: CHOLECYSTECTOMY, LAPAROSCOPIC;  Surgeon: Gil Monique DO;  Location: [facility];  Service: General;  Laterality: N/A;    TEAR DUCT SURGERY      right eye    TOTAL ABDOMINAL HYSTERECTOMY W/ BILATERAL SALPINGOOPHORECTOMY  01/18/2000       Family History   Problem Relation Age of Onset    Lymphoma Mother     Hypertension Mother     Lymphoma Father     Lymphoma Sister     Lymphoma Brother     Colon cancer Maternal Grandfather        Social History     Socioeconomic History    Marital status:    Tobacco Use    Smoking status: Never     Passive exposure: Past    Smokeless tobacco: Never   Substance and Sexual Activity    Alcohol use: Yes     Comment: only occas    Drug use: Never    Sexual activity: Not Currently     Comment:        There were no vitals filed for this visit.    Physical Exam  Constitutional:       General:  She is not in acute distress.  HENT:      Head: Normocephalic.   Cardiovascular:      Rate and Rhythm: Normal rate and regular rhythm.      Pulses: Normal pulses.   Pulmonary:      Effort: Pulmonary effort is normal. No respiratory distress.      Breath sounds: Normal breath sounds.   Abdominal:      General: Abdomen is flat. There is no distension.      Palpations: Abdomen is soft.      Tenderness: There is no abdominal tenderness.      Hernia: A hernia (Small subcentimeter hernia at her epigastric trocar site) is present.   Musculoskeletal:         General: Normal range of motion.   Lymphadenopathy:      Cervical: No cervical adenopathy.   Skin:     General: Skin is warm.   Neurological:      General: No focal deficit present.      Mental Status: She is oriented to person, place, and time.       Assessment & Plan:    Abdominal lymphadenopathy  -     Ambulatory referral/consult to General Surgery  -     Ambulatory referral/consult to Hematology / Oncology; Future; Expected date: 06/19/2023  -     CT Abdomen Pelvis With Contrast; Future; Expected date: 06/12/2023        Told patient would treat this conservatively right now and have a repeat CT scan with IV contrast in about 3 months' time.  However given her strong family history and her overall concern will refer her to Oncology in case they had any other insight or further workup for this.

## 2023-06-19 ENCOUNTER — OFFICE VISIT (OUTPATIENT)
Dept: GASTROENTEROLOGY | Facility: CLINIC | Age: 78
End: 2023-06-19
Payer: MEDICARE

## 2023-06-19 VITALS
BODY MASS INDEX: 25.47 KG/M2 | HEIGHT: 62 IN | SYSTOLIC BLOOD PRESSURE: 135 MMHG | WEIGHT: 138.38 LBS | DIASTOLIC BLOOD PRESSURE: 75 MMHG

## 2023-06-19 DIAGNOSIS — R19.7 DIARRHEA, UNSPECIFIED TYPE: Primary | ICD-10-CM

## 2023-06-19 LAB — OCCULT BLOOD: NEGATIVE

## 2023-06-19 PROCEDURE — 82272 OCCULT BLOOD X 1, STOOL: ICD-10-PCS | Mod: ,,, | Performed by: CLINICAL MEDICAL LABORATORY

## 2023-06-19 PROCEDURE — 99214 PR OFFICE/OUTPT VISIT, EST, LEVL IV, 30-39 MIN: ICD-10-PCS | Mod: S$PBB,,, | Performed by: NURSE PRACTITIONER

## 2023-06-19 PROCEDURE — 82272 OCCULT BLD FECES 1-3 TESTS: CPT | Mod: ,,, | Performed by: CLINICAL MEDICAL LABORATORY

## 2023-06-19 PROCEDURE — 99213 OFFICE O/P EST LOW 20 MIN: CPT | Mod: PBBFAC | Performed by: NURSE PRACTITIONER

## 2023-06-19 PROCEDURE — 99214 OFFICE O/P EST MOD 30 MIN: CPT | Mod: S$PBB,,, | Performed by: NURSE PRACTITIONER

## 2023-06-19 NOTE — PROGRESS NOTES
Jennifer Hale is a 78 y.o. female here for Diarrhea        PCP: Gagan Chavira  Referring Provider: No referring provider defined for this encounter.     HPI:  Presents due to loose stool.  She reports that she alternates formed stool followed by diarrhea.  Reports that at time bowel movements are very urgent.  Patient did have a cholecystectomy on 05/26/2023.  We did discuss the increased incidence of loose stool and bile acid diarrhea following gallbladder surgery.  Denies hematochezia and melena.  She reports some intermittent burning in her abdomen.  She did see Dr. Ozuna on 06/13 due to abnormal CT scan.  CT scan from [facility] reviewed, iliac and periaortic subcentimeter lymphadenopathy.  Patient has a strong family history of lymphoma.  She has been referred to Dr. Cornejo by Dr. Ozuna for further evaluation of lymphadenopathy.  Patient also describes having about a 5 lb weight loss. She did bring some stool study reports from [facility]. Calprotectin was negative. Enteric pathogens negative.  TSH 1.7-7.  HGB 13.8 and HCT 41.2.  ALT 39, AST 34, out colon phos is 90.  Last colonoscopy was 12/13/2019, with recommendation to repeat in 5 years.    Diarrhea   Pertinent negatives include no abdominal pain, fever or vomiting.       ROS:  Review of Systems   Constitutional:  Negative for appetite change, fatigue, fever and unexpected weight change.   HENT:  Negative for trouble swallowing.    Respiratory:  Negative for shortness of breath.    Cardiovascular:  Negative for chest pain.   Gastrointestinal:  Positive for diarrhea. Negative for abdominal pain, blood in stool, change in bowel habit, constipation, nausea, vomiting, reflux and change in bowel habit.   Musculoskeletal:  Negative for gait problem.   Integumentary:  Negative for pallor.   Psychiatric/Behavioral:  The patient is not nervous/anxious.         PMHX:  has a past medical history of Basal cell carcinoma, Cardiac murmur, GERD (gastroesophageal  "reflux disease), Hyperlipidemia, and Hypertension.    PSHX:  has a past surgical history that includes Total abdominal hysterectomy w/ bilateral salpingoophorectomy (01/18/2000); Tear duct surgery; basil cell carcinoma; Hysterectomy; and Laparoscopic cholecystectomy (N/A, 5/26/2022).    PFHX: family history includes Colon cancer in her maternal grandfather; Hypertension in her mother; Lymphoma in her brother, father, mother, and sister.    PSlHX:  reports that she has never smoked. She has been exposed to tobacco smoke. She has never used smokeless tobacco. She reports current alcohol use. She reports that she does not use drugs.        Review of patient's allergies indicates:   Allergen Reactions    Sulfa (sulfonamide antibiotics) Hives       Medication List with Changes/Refills   Current Medications    ASCORBIC ACID, VITAMIN C, (VITAMIN C) 500 MG TABLET    Vitamin C 500 MG Oral Tablet QTY: 0 tablet Days: 0 Refills: 0  Written: 05/11/22 Patient Instructions:    ASPIRIN (ECOTRIN) 81 MG EC TABLET    Take 81 mg by mouth every other day.    ATORVASTATIN (LIPITOR) 10 MG TABLET    TAKE 1 TABLET(10 MG) BY MOUTH EVERY DAY    CALCIUM CITRATE-VITAMIN D3 315-200 MG (CITRACAL+D) 315 MG-5 MCG (200 UNIT) PER TABLET    Take 1 tablet by mouth once daily.    CHOLECALCIFEROL, VITAMIN D3, (VITAMIN D3) 50 MCG (2,000 UNIT) CAP CAPSULE    Vitamin D3 50 MCG (2000 UT) Oral Capsule QTY: 0 capsule Days: 0 Refills: 0  Written: 05/11/22 Patient Instructions:    ESTRADIOL (ESTRACE) 0.01 % (0.1 MG/GRAM) VAGINAL CREAM    Place 1 g vaginally once daily.    MULTIVITAMIN CAPSULE    Take 1 capsule by mouth once daily.    POTASSIUM CHLORIDE SA (K-DUR,KLOR-CON M) 10 MEQ TABLET    Take 1 tablet (10 mEq total) by mouth once daily.    TRIAMTERENE-HYDROCHLOROTHIAZIDE 37.5-25 MG (MAXZIDE-25) 37.5-25 MG PER TABLET    TAKE 1 TABLET BY MOUTH EVERY DAY        Objective Findings:  Vital Signs:  /75   Ht 5' 2" (1.575 m)   Wt 62.8 kg (138 lb 6.4 oz)   " BMI 25.31 kg/m²  Body mass index is 25.31 kg/m².    Physical Exam:  Physical Exam  Vitals and nursing note reviewed.   Constitutional:       General: She is not in acute distress.     Appearance: Normal appearance.   HENT:      Mouth/Throat:      Mouth: Mucous membranes are moist.   Cardiovascular:      Rate and Rhythm: Normal rate.   Pulmonary:      Effort: Pulmonary effort is normal.      Breath sounds: No wheezing, rhonchi or rales.   Abdominal:      General: Bowel sounds are normal. There is no distension.      Palpations: Abdomen is soft. There is no mass.      Tenderness: There is no abdominal tenderness. There is no guarding.   Skin:     General: Skin is warm and dry.      Coloration: Skin is not jaundiced or pale.   Neurological:      Mental Status: She is alert and oriented to person, place, and time.   Psychiatric:         Mood and Affect: Mood normal.        Labs:  Lab Results   Component Value Date    WBC 4.41 (L) 05/24/2022    HGB 13.9 05/24/2022    HCT 41.4 05/24/2022    MCV 86.1 05/24/2022    RDW 12.5 05/24/2022     05/24/2022    LYMPH 33.3 05/24/2022    LYMPH 1.47 05/24/2022    MONO 8.8 (H) 05/24/2022    EOS 0.08 05/24/2022    BASO 0.06 05/24/2022     Lab Results   Component Value Date     05/24/2022    K 4.2 05/24/2022     05/24/2022    CO2 30 05/24/2022    GLU 83 05/24/2022    BUN 12 05/24/2022    CREATININE 0.80 05/24/2022    CALCIUM 9.4 05/24/2022    PROT 7.5 01/12/2022    ALBUMIN 4.2 01/12/2022    BILITOT 0.5 01/12/2022    ALKPHOS 87 01/12/2022    AST 26 01/12/2022    ALT 32 01/12/2022         Imaging: No results found.      Assessment:  Jennifer Hale is a 78 y.o. female here with:  1. Diarrhea, unspecified type          Recommendations:  1. Stool studies below today  2. Consider Colestid for diarrhea if stool studies are negative  3. Consider moving up colonoscopy with biopsy for diarrhea if needed  4. Avoid NSAID's  5. Keep appointment with Dr. Cornejo as directed    Follow  up in about 4 weeks (around 7/17/2023).      Order summary:  Orders Placed This Encounter    Fecal leukocytes    Giardia antigen    Fecal fat, qualitative    Occult blood x 1, stool    Miscellaneous Test, Sendout Pancreatic elastase       Thank you for allowing me to participate in the care of Jennifer Hale.      JEFFREY CaponeC

## 2023-06-21 ENCOUNTER — TELEPHONE (OUTPATIENT)
Dept: GASTROENTEROLOGY | Facility: CLINIC | Age: 78
End: 2023-06-21
Payer: MEDICARE

## 2023-06-21 NOTE — TELEPHONE ENCOUNTER
Results called to patient. Verbalized understanding.            ----- Message from TAMIKA Mcdaniel sent at 6/21/2023  3:34 PM CDT -----  Stool studies are normal including pancreatic elastase.

## 2023-06-26 ENCOUNTER — OFFICE VISIT (OUTPATIENT)
Dept: FAMILY MEDICINE | Facility: CLINIC | Age: 78
End: 2023-06-26
Payer: MEDICARE

## 2023-06-26 VITALS
SYSTOLIC BLOOD PRESSURE: 120 MMHG | DIASTOLIC BLOOD PRESSURE: 72 MMHG | RESPIRATION RATE: 18 BRPM | HEART RATE: 64 BPM | WEIGHT: 137 LBS | HEIGHT: 62 IN | TEMPERATURE: 98 F | OXYGEN SATURATION: 96 % | BODY MASS INDEX: 25.21 KG/M2

## 2023-06-26 DIAGNOSIS — E87.6 HYPOKALEMIA: Primary | ICD-10-CM

## 2023-06-26 LAB — POTASSIUM SERPL-SCNC: 4 MMOL/L (ref 3.5–5.1)

## 2023-06-26 PROCEDURE — 84132 POTASSIUM: ICD-10-PCS | Mod: ,,, | Performed by: CLINICAL MEDICAL LABORATORY

## 2023-06-26 PROCEDURE — 84132 ASSAY OF SERUM POTASSIUM: CPT | Mod: ,,, | Performed by: CLINICAL MEDICAL LABORATORY

## 2023-06-26 PROCEDURE — 99212 OFFICE O/P EST SF 10 MIN: CPT | Mod: ,,, | Performed by: NURSE PRACTITIONER

## 2023-06-26 PROCEDURE — 99212 PR OFFICE/OUTPT VISIT, EST, LEVL II, 10-19 MIN: ICD-10-PCS | Mod: ,,, | Performed by: NURSE PRACTITIONER

## 2023-06-26 NOTE — PROGRESS NOTES
[facility]    Chief Complaint      Chief Complaint   Patient presents with    Follow-up     2 week follow up to recheck potassium level. Reports she saw Dr Ozuna last week and was [facility](?)    [facility] - [facility]     Pt defers HepC screening, pneumonia/tetanus/shingles/covid vaccines and dexa scan at this time       History of Present Illness      Jennifer Hale is a 78 y.o. female. She  has a past medical history of Basal cell carcinoma, Cardiac murmur, GERD (gastroesophageal reflux disease), Hyperlipidemia, and Hypertension., who presents today for 2 week f/u for a recheck of her potassium.  Also reports seeing Dr. Ozuna who referred her to Dr. Cornejo with Oncology.     Patient reports she saw Dr. Cornejo on 6/20/23 and his recommendation was same as Dr. Ozuna to re scan her in 3 mo.  That is scheduled for 9/19/23 at [facility].     Past Medical History:  Past Medical History:   Diagnosis Date    Basal cell carcinoma     Cardiac murmur     GERD (gastroesophageal reflux disease)     Hyperlipidemia     Hypertension        Past Surgical History:   has a past surgical history that includes Total abdominal hysterectomy w/ bilateral salpingoophorectomy (01/18/2000); Tear duct surgery; basil cell carcinoma; Hysterectomy; and Laparoscopic cholecystectomy (N/A, 5/26/2022).    Social History:  Social History     Tobacco Use    Smoking status: Never     Passive exposure: Past    Smokeless tobacco: Never   Substance Use Topics    Alcohol use: Yes     Comment: only occas    Drug use: Never       I personally reviewed all past medical, surgical, and social.     Review of Systems   Constitutional:  Negative for fatigue.   Eyes:  Negative for visual disturbance.   Respiratory:  Negative for cough and shortness of breath.    Cardiovascular: Negative.    Gastrointestinal:  Negative for abdominal pain, constipation, diarrhea, nausea and vomiting.   Musculoskeletal:  Positive for arthralgias. Negative for gait  problem.   Skin: Negative.    Neurological:  Negative for dizziness, light-headedness and headaches.      Medications:  Outpatient Encounter Medications as of 6/26/2023   Medication Sig Dispense Refill    ascorbic acid, vitamin C, (VITAMIN C) 500 MG tablet Vitamin C 500 MG Oral Tablet QTY: 0 tablet Days: 0 Refills: 0  Written: 05/11/22 Patient Instructions:      atorvastatin (LIPITOR) 10 MG tablet TAKE 1 TABLET(10 MG) BY MOUTH EVERY DAY 90 tablet 3    calcium citrate-vitamin D3 315-200 mg (CITRACAL+D) 315 mg-5 mcg (200 unit) per tablet Take 1 tablet by mouth once daily.      cholecalciferol, vitamin D3, (VITAMIN D3) 50 mcg (2,000 unit) Cap capsule Vitamin D3 50 MCG (2000 UT) Oral Capsule QTY: 0 capsule Days: 0 Refills: 0  Written: 05/11/22 Patient Instructions:      multivitamin capsule Take 1 capsule by mouth once daily.      potassium chloride SA (K-DUR,KLOR-CON M) 10 MEQ tablet Take 1 tablet (10 mEq total) by mouth once daily. 90 tablet 1    triamterene-hydrochlorothiazide 37.5-25 mg (MAXZIDE-25) 37.5-25 mg per tablet TAKE 1 TABLET BY MOUTH EVERY DAY 90 tablet 1    estradioL (ESTRACE) 0.01 % (0.1 mg/gram) vaginal cream Place 1 g vaginally once daily. 42.5 g 1    [DISCONTINUED] aspirin (ECOTRIN) 81 MG EC tablet Take 81 mg by mouth every other day.      [DISCONTINUED] tolterodine (DETROL LA) 4 MG 24 hr capsule Take 1 capsule (4 mg total) by mouth once daily. (Patient not taking: Reported on 1/12/2022) 30 capsule 2     No facility-administered encounter medications on file as of 6/26/2023.       Allergies:  Review of patient's allergies indicates:   Allergen Reactions    Sulfa (sulfonamide antibiotics) Hives       Health Maintenance:  Immunization History   Administered Date(s) Administered    COVID-19, MRNA, LN-S, PF (MODERNA FULL 0.5 ML DOSE) 03/01/2021, 11/05/2021, 07/01/2022    COVID-19, mRNA, LNP-S, bivalent booster, PF (Moderna Omicron) 12/16/2022    Influenza 10/01/2021      Health Maintenance   Topic Date  "Due    Hepatitis C Screening  Never done    TETANUS VACCINE  Never done    DEXA Scan  Never done    Lipid Panel  04/27/2026        Physical Exam      Vital Signs  Temp: 98.3 °F (36.8 °C)  Temp Source: Oral  Pulse: 64  Resp: 18  SpO2: 96 %  BP: 120/72  BP Location: Left arm  Patient Position: Sitting  Pain Score: 0-No pain  Height and Weight  Height: 5' 2" (157.5 cm)  Weight: 62.1 kg (137 lb)  BSA (Calculated - sq m): 1.65 sq meters  BMI (Calculated): 25.1  Weight in (lb) to have BMI = 25: 136.4]    Physical Exam  Vitals and nursing note reviewed.   Constitutional:       Appearance: Normal appearance. She is well-developed.   HENT:      Head: Normocephalic.      Right Ear: Hearing normal.      Left Ear: Hearing normal.      Nose: Nose normal.   Eyes:      General: Lids are normal.      Conjunctiva/sclera: Conjunctivae normal.   Cardiovascular:      Rate and Rhythm: Normal rate and regular rhythm.      Heart sounds: Normal heart sounds.   Pulmonary:      Effort: Pulmonary effort is normal.      Breath sounds: Normal breath sounds.   Musculoskeletal:         General: Normal range of motion.      Cervical back: Normal range of motion and neck supple.      Right lower leg: No edema.      Left lower leg: No edema.   Skin:     General: Skin is warm and dry.   Neurological:      Mental Status: She is alert and oriented to person, place, and time.      Gait: Gait is intact.   Psychiatric:         Behavior: Behavior is cooperative.        Laboratory:  CBC:  Recent Labs   Lab 01/12/22  1437 04/25/22  0818 05/24/22  0818   WBC 5.83 4.35 L 4.41 L   RBC 4.70 4.71 4.81   Hemoglobin 13.4 13.7 13.9   Hematocrit 41.1 41.2 41.4   Platelet Count 267 229 245   MCV 87.4 87.5 86.1   MCH 28.5 29.1 28.9   MCHC 32.6 33.3 33.6     CMP:  Recent Labs   Lab 04/27/21  1655 01/12/22  1437 04/25/22  0818 05/24/22  0818   Glucose 95 91   < > 83   Calcium 9.3 9.7   < > 9.4   Albumin 4.4 4.2  --   --    Total Protein 7.1 7.5  --   --    Sodium 138 137 "   < > 137   Potassium 3.8 3.7   < > 4.2   CO2 30 30   < > 30   Chloride 103 102   < > 101   BUN 20 H 17   < > 12   Alk Phos 87 87  --   --    ALT 31 32  --   --    AST 24 26  --   --    Bilirubin, Total 0.3 0.5  --   --     < > = values in this interval not displayed.     LIPIDS:  Recent Labs   Lab 04/27/21  1655   HDL Cholesterol 51   Cholesterol 201 H   Triglycerides 210 H   LDL Calculated 108   Cholesterol/HDL Ratio (Risk Factor) 3.9   Non-     TSH:      A1C:        Assessment/Plan     Jennifer Hale is a 78 y.o.female with:     1. Hypokalemia  - Potassium; Future       Total time spent face-to-face and non-face-to-face coordinating care for this encounter was: 10 min    Chronic conditions status updated as per HPI.  Other than changes above, cont current medications and maintain follow up with specialists.  Return to clinic prn if symptoms worsen or fail to improve.    Kaylyn Strickland, FNP  [facility]

## 2023-07-03 ENCOUNTER — TELEPHONE (OUTPATIENT)
Dept: FAMILY MEDICINE | Facility: CLINIC | Age: 78
End: 2023-07-03
Payer: MEDICARE

## 2023-07-20 ENCOUNTER — OFFICE VISIT (OUTPATIENT)
Dept: GASTROENTEROLOGY | Facility: CLINIC | Age: 78
End: 2023-07-20
Payer: MEDICARE

## 2023-07-20 VITALS
HEIGHT: 62 IN | BODY MASS INDEX: 24.54 KG/M2 | DIASTOLIC BLOOD PRESSURE: 64 MMHG | SYSTOLIC BLOOD PRESSURE: 120 MMHG | WEIGHT: 133.38 LBS

## 2023-07-20 DIAGNOSIS — K59.00 CONSTIPATION, UNSPECIFIED CONSTIPATION TYPE: Primary | ICD-10-CM

## 2023-07-20 PROCEDURE — 99213 OFFICE O/P EST LOW 20 MIN: CPT | Mod: PBBFAC | Performed by: NURSE PRACTITIONER

## 2023-07-20 PROCEDURE — 99213 PR OFFICE/OUTPT VISIT, EST, LEVL III, 20-29 MIN: ICD-10-PCS | Mod: S$PBB,,, | Performed by: NURSE PRACTITIONER

## 2023-07-20 PROCEDURE — 99213 OFFICE O/P EST LOW 20 MIN: CPT | Mod: S$PBB,,, | Performed by: NURSE PRACTITIONER

## 2023-07-20 NOTE — PROGRESS NOTES
Jennifer Hale is a 78 y.o. female here for No chief complaint on file.        PCP: Gagan Chavira  Referring Provider: No referring provider defined for this encounter.     HPI:  Presents for follow-up due to loose stool.  Patient reports that Metamucil has somewhat improved stool consistency.  Reports that she will have formed bowel movements for approximately 4 days.  After that time she will have a formed bowel movement that is followed by multiple loose stools. This may be related to constipation. Endorses gas bloating. No hematochezia or melena. Cholecystectomy 5/26/23 per Dr Ozuna.  She had a CT scan on 06/13 that showed lymphadenopathy.  She was referred to Dr. Cornejo by Dr. Ozuna.  Reports that she did have a CT scan at [facility], report is not available for review.  Reports that she is also supposed to have a repeat CT scan in September.  No fever or weight loss.        ROS:  Review of Systems   Constitutional:  Negative for appetite change, fatigue, fever and unexpected weight change.   HENT:  Negative for trouble swallowing.    Respiratory:  Negative for shortness of breath.    Cardiovascular:  Negative for chest pain.   Gastrointestinal:  Positive for constipation. Negative for abdominal pain, blood in stool, change in bowel habit, diarrhea, nausea, vomiting, reflux and change in bowel habit.   Musculoskeletal:  Negative for gait problem.   Integumentary:  Negative for pallor.   Psychiatric/Behavioral:  The patient is not nervous/anxious.         PMHX:  has a past medical history of Basal cell carcinoma, Cardiac murmur, GERD (gastroesophageal reflux disease), Hyperlipidemia, and Hypertension.    PSHX:  has a past surgical history that includes Total abdominal hysterectomy w/ bilateral salpingoophorectomy (01/18/2000); Tear duct surgery; basil cell carcinoma; Hysterectomy; and Laparoscopic cholecystectomy (N/A, 5/26/2022).    PFHX: family history includes Colon cancer in her maternal grandfather;  "Hypertension in her mother; Lymphoma in her brother, father, mother, and sister.    PSlHX:  reports that she has never smoked. She has been exposed to tobacco smoke. She has never used smokeless tobacco. She reports current alcohol use. She reports that she does not use drugs.        Review of patient's allergies indicates:   Allergen Reactions    Sulfa (sulfonamide antibiotics) Hives       Medication List with Changes/Refills   Current Medications    ASCORBIC ACID, VITAMIN C, (VITAMIN C) 500 MG TABLET    Vitamin C 500 MG Oral Tablet QTY: 0 tablet Days: 0 Refills: 0  Written: 05/11/22 Patient Instructions:    ATORVASTATIN (LIPITOR) 10 MG TABLET    TAKE 1 TABLET(10 MG) BY MOUTH EVERY DAY    CALCIUM CITRATE-VITAMIN D3 315-200 MG (CITRACAL+D) 315 MG-5 MCG (200 UNIT) PER TABLET    Take 1 tablet by mouth once daily.    CHOLECALCIFEROL, VITAMIN D3, (VITAMIN D3) 50 MCG (2,000 UNIT) CAP CAPSULE    Vitamin D3 50 MCG (2000 UT) Oral Capsule QTY: 0 capsule Days: 0 Refills: 0  Written: 05/11/22 Patient Instructions:    ESTRADIOL (ESTRACE) 0.01 % (0.1 MG/GRAM) VAGINAL CREAM    Place 1 g vaginally once daily.    MULTIVITAMIN CAPSULE    Take 1 capsule by mouth once daily.    POTASSIUM CHLORIDE SA (K-DUR,KLOR-CON M) 10 MEQ TABLET    Take 1 tablet (10 mEq total) by mouth once daily.    TRIAMTERENE-HYDROCHLOROTHIAZIDE 37.5-25 MG (MAXZIDE-25) 37.5-25 MG PER TABLET    TAKE 1 TABLET BY MOUTH EVERY DAY        Objective Findings:  Vital Signs:  /64   Ht 5' 2" (1.575 m)   Wt 60.5 kg (133 lb 6.4 oz)   BMI 24.40 kg/m²  Body mass index is 24.4 kg/m².    Physical Exam:  Physical Exam  Vitals and nursing note reviewed.   Constitutional:       General: She is not in acute distress.     Appearance: Normal appearance.   HENT:      Mouth/Throat:      Mouth: Mucous membranes are moist.   Cardiovascular:      Rate and Rhythm: Normal rate.   Pulmonary:      Effort: Pulmonary effort is normal.      Breath sounds: No wheezing, rhonchi or rales. "   Abdominal:      General: Bowel sounds are normal. There is no distension.      Palpations: Abdomen is soft. There is no mass.      Tenderness: There is no abdominal tenderness. There is no guarding.   Skin:     General: Skin is warm and dry.      Coloration: Skin is not jaundiced or pale.   Neurological:      Mental Status: She is alert and oriented to person, place, and time.   Psychiatric:         Mood and Affect: Mood normal.        Labs:  Lab Results   Component Value Date    WBC 4.41 (L) 05/24/2022    HGB 13.9 05/24/2022    HCT 41.4 05/24/2022    MCV 86.1 05/24/2022    RDW 12.5 05/24/2022     05/24/2022    LYMPH 33.3 05/24/2022    LYMPH 1.47 05/24/2022    MONO 8.8 (H) 05/24/2022    EOS 0.08 05/24/2022    BASO 0.06 05/24/2022     Lab Results   Component Value Date     05/24/2022    K 4.0 06/26/2023     05/24/2022    CO2 30 05/24/2022    GLU 83 05/24/2022    BUN 12 05/24/2022    CREATININE 0.80 05/24/2022    CALCIUM 9.4 05/24/2022    PROT 7.5 01/12/2022    ALBUMIN 4.2 01/12/2022    BILITOT 0.5 01/12/2022    ALKPHOS 87 01/12/2022    AST 26 01/12/2022    ALT 32 01/12/2022         Imaging: No results found.      Assessment:  Jennifer Hale is a 78 y.o. female here with:  1. Constipation, unspecified constipation type          Recommendations:  1. Continue Metamucil  2. May add MiraLax powder 17 g in 8 oz of liquid daily for constipation  3. Increase fluid intake to 64 oz per day  4. From records from [facility]    Follow up in about 3 months (around 10/20/2023).      Order summary:       Thank you for allowing me to participate in the care of Jennifer Hale.      LUZ Capone

## 2023-08-18 ENCOUNTER — PATIENT MESSAGE (OUTPATIENT)
Dept: ADMINISTRATIVE | Facility: HOSPITAL | Age: 78
End: 2023-08-18

## 2023-08-22 ENCOUNTER — TELEPHONE (OUTPATIENT)
Dept: INTERNAL MEDICINE | Facility: CLINIC | Age: 78
End: 2023-08-22
Payer: MEDICARE

## 2023-08-22 NOTE — TELEPHONE ENCOUNTER
Called pt to schedule. Pt did not answer. Vm left with call back number. Pt needs an appointment as it has been over a year since she has been seen.   Rec'd  message from lashonda vasquez to set up dexa scan.

## 2023-08-29 ENCOUNTER — OFFICE VISIT (OUTPATIENT)
Dept: FAMILY MEDICINE | Facility: CLINIC | Age: 78
End: 2023-08-29
Payer: MEDICARE

## 2023-08-29 VITALS
OXYGEN SATURATION: 95 % | HEART RATE: 70 BPM | WEIGHT: 135.81 LBS | TEMPERATURE: 98 F | BODY MASS INDEX: 24.99 KG/M2 | DIASTOLIC BLOOD PRESSURE: 77 MMHG | SYSTOLIC BLOOD PRESSURE: 118 MMHG | HEIGHT: 62 IN | RESPIRATION RATE: 17 BRPM

## 2023-08-29 DIAGNOSIS — Z12.39 ENCOUNTER FOR SCREENING FOR MALIGNANT NEOPLASM OF BREAST, UNSPECIFIED SCREENING MODALITY: ICD-10-CM

## 2023-08-29 DIAGNOSIS — Z13.820 SCREENING FOR OSTEOPOROSIS: ICD-10-CM

## 2023-08-29 DIAGNOSIS — R92.30 DENSE BREASTS: ICD-10-CM

## 2023-08-29 DIAGNOSIS — E87.6 HYPOKALEMIA: Primary | ICD-10-CM

## 2023-08-29 DIAGNOSIS — Z12.31 ENCOUNTER FOR SCREENING MAMMOGRAM FOR MALIGNANT NEOPLASM OF BREAST: ICD-10-CM

## 2023-08-29 DIAGNOSIS — E89.40 ASYMPTOMATIC POSTPROCEDURAL OVARIAN FAILURE: ICD-10-CM

## 2023-08-29 LAB — POTASSIUM SERPL-SCNC: 4.5 MMOL/L (ref 3.5–5.1)

## 2023-08-29 PROCEDURE — 99213 OFFICE O/P EST LOW 20 MIN: CPT | Mod: ,,, | Performed by: FAMILY MEDICINE

## 2023-08-29 PROCEDURE — 84132 POTASSIUM: ICD-10-PCS | Mod: ,,, | Performed by: CLINICAL MEDICAL LABORATORY

## 2023-08-29 PROCEDURE — 99213 PR OFFICE/OUTPT VISIT, EST, LEVL III, 20-29 MIN: ICD-10-PCS | Mod: ,,, | Performed by: FAMILY MEDICINE

## 2023-08-29 PROCEDURE — 84132 ASSAY OF SERUM POTASSIUM: CPT | Mod: ,,, | Performed by: CLINICAL MEDICAL LABORATORY

## 2023-08-29 RX ORDER — ASPIRIN 325 MG
1 TABLET, DELAYED RELEASE (ENTERIC COATED) ORAL
COMMUNITY

## 2023-08-29 NOTE — PROGRESS NOTES
Jennifer Hale is a 78 y.o. female seen today for follow-up for her abdominal lymphadenopathy.  She has been seen by General surgery GI and Oncology.  Patient is a follow-up CT of the abdomen scheduled in late September.  Patient also is here to follow-up on her low hypokalemia today.  Currently, she is feeling well with no chest pain or shortness of breath and no specific abdominal discomfort.  Patient needs a follow-up DEXA scan and also a follow-up mammogram and possible ultrasound of the breast due to history of dense breast tissue and family history high in breast cancer unfortunately.    Past Medical History:   Diagnosis Date    Basal cell carcinoma     Cardiac murmur     GERD (gastroesophageal reflux disease)     Hyperlipidemia     Hypertension      Family History   Problem Relation Age of Onset    Lymphoma Mother     Hypertension Mother     Lymphoma Father     Lymphoma Sister     Lymphoma Brother     Colon cancer Maternal Grandfather      Current Outpatient Medications on File Prior to Visit   Medication Sig Dispense Refill    ascorbic acid, vitamin C, (VITAMIN C) 500 MG tablet Vitamin C 500 MG Oral Tablet QTY: 0 tablet Days: 0 Refills: 0  Written: 05/11/22 Patient Instructions:      atorvastatin (LIPITOR) 10 MG tablet TAKE 1 TABLET(10 MG) BY MOUTH EVERY DAY 90 tablet 3    calcium citrate-vitamin D3 315-200 mg (CITRACAL+D) 315 mg-5 mcg (200 unit) per tablet Take 1 tablet by mouth once daily.      multivitamin capsule Take 1 capsule by mouth once daily.      omega 3-dha-epa-fish oil (FISH OIL) 60- mg Cap 1 capsule.      potassium chloride SA (K-DUR,KLOR-CON M) 10 MEQ tablet Take 1 tablet (10 mEq total) by mouth once daily. 90 tablet 1    triamterene-hydrochlorothiazide 37.5-25 mg (MAXZIDE-25) 37.5-25 mg per tablet TAKE 1 TABLET BY MOUTH EVERY DAY 90 tablet 1    cholecalciferol, vitamin D3, (VITAMIN D3) 50 mcg (2,000 unit) Cap capsule Vitamin D3 50 MCG (2000 UT) Oral Capsule QTY: 0 capsule Days: 0  Refills: 0  Written: 05/11/22 Patient Instructions:      estradioL (ESTRACE) 0.01 % (0.1 mg/gram) vaginal cream Place 1 g vaginally once daily. 42.5 g 1    [DISCONTINUED] tolterodine (DETROL LA) 4 MG 24 hr capsule Take 1 capsule (4 mg total) by mouth once daily. (Patient not taking: Reported on 1/12/2022) 30 capsule 2     No current facility-administered medications on file prior to visit.     Immunization History   Administered Date(s) Administered    COVID-19, MRNA, LN-S, PF (MODERNA FULL 0.5 ML DOSE) 03/01/2021, 11/05/2021, 07/01/2022    COVID-19, mRNA, LNP-S, bivalent booster, PF (Moderna Omicron) 12/16/2022    Influenza 10/01/2021       Review of Systems   Constitutional:  Positive for weight loss. Negative for fever and malaise/fatigue.   Respiratory:  Negative for shortness of breath.    Cardiovascular:  Negative for chest pain and palpitations.   Gastrointestinal:  Negative for nausea and vomiting.   Psychiatric/Behavioral:  Negative for depression.         Vitals:    08/29/23 0859   BP: 118/77   Pulse: 70   Resp: 17   Temp: 97.6 °F (36.4 °C)       Physical Exam  Vitals reviewed.   Constitutional:       Appearance: Normal appearance.   HENT:      Head: Normocephalic.   Eyes:      Extraocular Movements: Extraocular movements intact.      Conjunctiva/sclera: Conjunctivae normal.      Pupils: Pupils are equal, round, and reactive to light.   Neck:      Thyroid: No thyroid mass or thyromegaly.   Cardiovascular:      Rate and Rhythm: Normal rate and regular rhythm.      Heart sounds: Normal heart sounds. No murmur heard.     No gallop.   Pulmonary:      Effort: Pulmonary effort is normal. No respiratory distress.      Breath sounds: Normal breath sounds. No wheezing or rales.   Skin:     General: Skin is warm and dry.      Coloration: Skin is not jaundiced or pale.   Neurological:      Mental Status: She is alert.   Psychiatric:         Mood and Affect: Mood normal.         Behavior: Behavior normal.          Thought Content: Thought content normal.         Judgment: Judgment normal.          Assessment and Plan  Hypokalemia  -     Potassium; Future; Expected date: 08/29/2023    Screening for osteoporosis  -     DXA Bone Density Axial Skeleton 1 or more sites; Future; Expected date: 08/29/2023    Asymptomatic postprocedural ovarian failure  -     DXA Bone Density Axial Skeleton 1 or more sites; Future; Expected date: 08/29/2023    Encounter for screening for malignant neoplasm of breast, unspecified screening modality  -     Mammo Digital Screening Bilat; Future; Expected date: 08/29/2023    Dense breasts  -     Mammo Digital Screening Bilat; Future; Expected date: 08/29/2023    Encounter for screening mammogram for malignant neoplasm of breast  -     Mammo Digital Screening Bilat; Future; Expected date: 08/29/2023            Return to clinic in 3 months or as needed once her testing is done.    Health Maintenance Topics with due status: Not Due       Topic Last Completion Date    Lipid Panel 04/27/2021    Influenza Vaccine 10/01/2021

## 2023-08-30 ENCOUNTER — TELEPHONE (OUTPATIENT)
Dept: FAMILY MEDICINE | Facility: CLINIC | Age: 78
End: 2023-08-30
Payer: MEDICARE

## 2023-08-30 NOTE — TELEPHONE ENCOUNTER
----- Message from Raghavendra Lui MD sent at 8/30/2023  7:52 AM CDT -----  Her potassium is back to normal.

## 2023-09-07 ENCOUNTER — TELEPHONE (OUTPATIENT)
Dept: FAMILY MEDICINE | Facility: CLINIC | Age: 78
End: 2023-09-07
Payer: MEDICARE

## 2023-09-07 ENCOUNTER — HOSPITAL ENCOUNTER (OUTPATIENT)
Dept: RADIOLOGY | Facility: HOSPITAL | Age: 78
Discharge: HOME | End: 2023-09-07
Attending: FAMILY MEDICINE
Payer: MEDICARE

## 2023-09-07 DIAGNOSIS — Z13.820 SCREENING FOR OSTEOPOROSIS: ICD-10-CM

## 2023-09-07 DIAGNOSIS — E89.40 ASYMPTOMATIC POSTPROCEDURAL OVARIAN FAILURE: ICD-10-CM

## 2023-09-07 PROCEDURE — 77080 DXA BONE DENSITY AXIAL: CPT | Mod: 26,,, | Performed by: STUDENT IN AN ORGANIZED HEALTH CARE EDUCATION/TRAINING PROGRAM

## 2023-09-07 PROCEDURE — 77080 DXA BONE DENSITY AXIAL SKELETON 1 OR MORE SITES: ICD-10-PCS | Mod: 26,,, | Performed by: STUDENT IN AN ORGANIZED HEALTH CARE EDUCATION/TRAINING PROGRAM

## 2023-09-07 PROCEDURE — 77080 DXA BONE DENSITY AXIAL: CPT | Mod: TC

## 2023-09-07 NOTE — TELEPHONE ENCOUNTER
----- Message from Raghavendra Lui MD sent at 9/7/2023 12:02 PM CDT -----  Patient does have osteopenia and I recommend daily vitamin-D of 600 units and at least 1200 mg of calcium daily.  She will need a follow-up DEXA scan in 2 years

## 2023-09-08 ENCOUNTER — TELEPHONE (OUTPATIENT)
Dept: FAMILY MEDICINE | Facility: CLINIC | Age: 78
End: 2023-09-08
Payer: MEDICARE

## 2023-09-08 NOTE — TELEPHONE ENCOUNTER
Notified Pt of recent scan results, Pt verbalized understanding and stated she would start the vitamin D and calcium.

## 2023-10-05 ENCOUNTER — OFFICE VISIT (OUTPATIENT)
Dept: FAMILY MEDICINE | Facility: CLINIC | Age: 78
End: 2023-10-05
Payer: MEDICARE

## 2023-10-05 VITALS
HEIGHT: 62 IN | SYSTOLIC BLOOD PRESSURE: 124 MMHG | OXYGEN SATURATION: 98 % | DIASTOLIC BLOOD PRESSURE: 78 MMHG | BODY MASS INDEX: 24.84 KG/M2 | HEART RATE: 71 BPM | RESPIRATION RATE: 15 BRPM | WEIGHT: 135 LBS | TEMPERATURE: 98 F

## 2023-10-05 DIAGNOSIS — R22.31 MASS OF RIGHT AXILLA: Primary | ICD-10-CM

## 2023-10-05 PROCEDURE — 99212 PR OFFICE/OUTPT VISIT, EST, LEVL II, 10-19 MIN: ICD-10-PCS | Mod: ,,, | Performed by: FAMILY MEDICINE

## 2023-10-05 PROCEDURE — 99212 OFFICE O/P EST SF 10 MIN: CPT | Mod: ,,, | Performed by: FAMILY MEDICINE

## 2023-10-05 NOTE — PROGRESS NOTES
Jennifer Hale is a 78 y.o. female seen today for a 2 day history of a subcutaneous nodule involving her right axilla.  She does have a strong family history of lymphoma which makes the patient very concerned at this finding.  She reports in the past she has had a biopsy of a lymph node in the same area which is negative and recent CT of the abdomen and pelvis showed no progression in her lymphadenopathy as well as normal LDH and CBC labs.      Past Medical History:   Diagnosis Date    Basal cell carcinoma     Cardiac murmur     GERD (gastroesophageal reflux disease)     Hyperlipidemia     Hypertension      Family History   Problem Relation Age of Onset    Lymphoma Mother     Hypertension Mother     Lymphoma Father     Lymphoma Sister     Lymphoma Brother     Colon cancer Maternal Grandfather      Current Outpatient Medications on File Prior to Visit   Medication Sig Dispense Refill    ascorbic acid, vitamin C, (VITAMIN C) 500 MG tablet Vitamin C 500 MG Oral Tablet QTY: 0 tablet Days: 0 Refills: 0  Written: 05/11/22 Patient Instructions:      calcium citrate-vitamin D3 315-200 mg (CITRACAL+D) 315 mg-5 mcg (200 unit) per tablet Take 1 tablet by mouth once daily.      cholecalciferol, vitamin D3, (VITAMIN D3) 50 mcg (2,000 unit) Cap capsule Vitamin D3 50 MCG (2000 UT) Oral Capsule QTY: 0 capsule Days: 0 Refills: 0  Written: 05/11/22 Patient Instructions:      multivitamin capsule Take 1 capsule by mouth once daily.      omega 3-dha-epa-fish oil (FISH OIL) 60- mg Cap 1 capsule.      potassium chloride SA (K-DUR,KLOR-CON M) 10 MEQ tablet Take 1 tablet (10 mEq total) by mouth once daily. 90 tablet 1    triamterene-hydrochlorothiazide 37.5-25 mg (MAXZIDE-25) 37.5-25 mg per tablet TAKE 1 TABLET BY MOUTH EVERY DAY 90 tablet 1    atorvastatin (LIPITOR) 10 MG tablet TAKE 1 TABLET(10 MG) BY MOUTH EVERY DAY 90 tablet 3    estradioL (ESTRACE) 0.01 % (0.1 mg/gram) vaginal cream Place 1 g vaginally once daily. 42.5 g 1     [DISCONTINUED] tolterodine (DETROL LA) 4 MG 24 hr capsule Take 1 capsule (4 mg total) by mouth once daily. (Patient not taking: Reported on 1/12/2022) 30 capsule 2     No current facility-administered medications on file prior to visit.     Immunization History   Administered Date(s) Administered    COVID-19, MRNA, LN-S, PF (MODERNA FULL 0.5 ML DOSE) 03/01/2021, 11/05/2021, 07/01/2022    COVID-19, mRNA, LNP-S, bivalent booster, PF (Moderna Omicron)12 + YEARS 12/16/2022    Influenza 10/01/2021       Review of Systems   Constitutional:  Negative for fever, malaise/fatigue and weight loss.   Respiratory:  Negative for shortness of breath.    Cardiovascular:  Negative for chest pain and palpitations.   Gastrointestinal:  Negative for nausea and vomiting.   Psychiatric/Behavioral:  Negative for depression.         Vitals:    10/05/23 1337   BP: 124/78   Pulse: 71   Resp: 15   Temp: 98.1 °F (36.7 °C)       Physical Exam  Lymphadenopathy:      Upper Body:      Right upper body: Axillary adenopathy present.      Comments: Patient has shoddy lymphadenopathy involving 1 nodule that is freely mobile in the right axilla          Assessment and Plan  1. Mass of right axilla  -     US Soft Tissue Axilla, Right; Future; Expected date: 10/05/2023             Return to clinic in after her ultrasound report    Health Maintenance Topics with due status: Not Due       Topic Last Completion Date    Lipid Panel 04/27/2021    DEXA Scan 09/07/2023

## 2023-10-12 ENCOUNTER — OFFICE VISIT (OUTPATIENT)
Dept: ORTHOPEDICS | Facility: CLINIC | Age: 78
End: 2023-10-12
Payer: MEDICARE

## 2023-10-12 DIAGNOSIS — M17.11 PRIMARY OSTEOARTHRITIS OF RIGHT KNEE: Primary | ICD-10-CM

## 2023-10-12 PROCEDURE — 99212 OFFICE O/P EST SF 10 MIN: CPT | Mod: PBBFAC | Performed by: ORTHOPAEDIC SURGERY

## 2023-10-12 PROCEDURE — 20610 DRAIN/INJ JOINT/BURSA W/O US: CPT | Mod: PBBFAC,RT | Performed by: ORTHOPAEDIC SURGERY

## 2023-10-12 PROCEDURE — 99213 PR OFFICE/OUTPT VISIT, EST, LEVL III, 20-29 MIN: ICD-10-PCS | Mod: S$PBB,25,, | Performed by: ORTHOPAEDIC SURGERY

## 2023-10-12 PROCEDURE — 20610 LARGE JOINT ASPIRATION/INJECTION: R KNEE: ICD-10-PCS | Mod: S$PBB,RT,, | Performed by: ORTHOPAEDIC SURGERY

## 2023-10-12 PROCEDURE — 99999PBSHW PR PBB SHADOW TECHNICAL ONLY FILED TO HB: ICD-10-PCS | Mod: PBBFAC,,,

## 2023-10-12 PROCEDURE — 99213 OFFICE O/P EST LOW 20 MIN: CPT | Mod: S$PBB,25,, | Performed by: ORTHOPAEDIC SURGERY

## 2023-10-12 PROCEDURE — 99999PBSHW PR PBB SHADOW TECHNICAL ONLY FILED TO HB: Mod: PBBFAC,,,

## 2023-10-12 RX ADMIN — BUPIVACAINE HYDROCHLORIDE 3 ML: 5 INJECTION, SOLUTION PERINEURAL at 10:10

## 2023-10-12 RX ADMIN — TRIAMCINOLONE ACETONIDE 40 MG: 400 INJECTION, SUSPENSION INTRA-ARTICULAR; INTRAMUSCULAR at 10:10

## 2023-10-12 NOTE — PROGRESS NOTES
CC:  Knee pain    78 y.o. Female returns to clinic for a follow up visit regarding knee pain.       States her last injection in January gave her good relief. Patient getting ready for a trip next month and would like to have another injection.       Past Medical History:   Diagnosis Date    Basal cell carcinoma     Cardiac murmur     GERD (gastroesophageal reflux disease)     Hyperlipidemia     Hypertension      Past Surgical History:   Procedure Laterality Date    basil cell carcinoma      left cheek    HYSTERECTOMY      LAPAROSCOPIC CHOLECYSTECTOMY N/A 5/26/2022    Procedure: CHOLECYSTECTOMY, LAPAROSCOPIC;  Surgeon: Gil Monique DO;  Location: [facility], [place];  Service: General;  Laterality: N/A;    TEAR DUCT SURGERY      right eye    TOTAL ABDOMINAL HYSTERECTOMY W/ BILATERAL SALPINGOOPHORECTOMY  01/18/2000         PHYSICAL EXAMINATION:  There were no vitals taken for this visit.  General    Constitutional: She is oriented to person, place, and time. She appears well-developed and well-nourished.   HENT:   Head: Normocephalic and atraumatic.   Eyes: EOM are normal. Pupils are equal, round, and reactive to light.   Neck: Neck supple.   Cardiovascular:  Normal rate and intact distal pulses.            Pulmonary/Chest: Effort normal and breath sounds normal.   Neurological: She is alert and oriented to person, place, and time. She has normal reflexes. No cranial nerve deficit. She exhibits normal muscle tone. Coordination normal.   Psychiatric: She has a normal mood and affect. Her behavior is normal. Judgment normal.           Right Knee Exam     Tenderness   The patient is tender to palpation of the lateral retinaculum and condyle.    Range of Motion   Extension:  normal   Flexion:  normal     Tests   Meniscus   Wei:  Medial - negative   Ligament Examination   Lachman: normal (-1 to 2mm)   Pivot Shift: normal (Equal)    Left Knee Exam     Inspection   Scars: absent    Muscle Strength   Right  Lower Extremity   Hip Abduction: 5/5   Quadriceps:  5/5   Hamstrin/5         IMAGING:  No results found.     ASSESSMENT:    No diagnosis found.    PLAN:     -Findings and treatment options were discussed with the patient  -All questions answered  Natural history and expected course discussed. Questions answered.  Educational materials distributed.  Rest, ice, compression, and elevation (RICE) therapy.  Arthrocentesis. See procedure note.      There are no Patient Instructions on file for this visit.      No orders of the defined types were placed in this encounter.        Large Joint Aspiration/Injection: R knee    Date/Time: 10/12/2023 10:45 AM    Performed by: Matty Sheikh MD  Authorized by: Matty Sheikh MD    Consent Done?:  Yes (Verbal)  Indications:  Pain  Local anesthesia used?: No      Details:  Needle Size:  22 G  Approach:  Superior  Location:  Knee  Site:  R knee  Medications:  3 mL BUPivacaine 0.5 % (5 mg/mL); 40 mg triamcinolone acetonide 40 mg/mL  Patient tolerance:  Patient tolerated the procedure well with no immediate complications

## 2023-10-13 RX ORDER — TRIAMCINOLONE ACETONIDE 40 MG/ML
40 INJECTION, SUSPENSION INTRA-ARTICULAR; INTRAMUSCULAR
Status: DISCONTINUED | OUTPATIENT
Start: 2023-10-12 | End: 2023-10-13 | Stop reason: HOSPADM

## 2023-10-13 RX ORDER — BUPIVACAINE HYDROCHLORIDE 5 MG/ML
3 INJECTION, SOLUTION PERINEURAL
Status: DISCONTINUED | OUTPATIENT
Start: 2023-10-12 | End: 2023-10-13 | Stop reason: HOSPADM

## 2023-10-19 ENCOUNTER — HOSPITAL ENCOUNTER (OUTPATIENT)
Dept: RADIOLOGY | Facility: HOSPITAL | Age: 78
Discharge: HOME | End: 2023-10-19
Attending: FAMILY MEDICINE
Payer: MEDICARE

## 2023-10-19 DIAGNOSIS — R22.31 MASS OF RIGHT AXILLA: ICD-10-CM

## 2023-10-19 PROCEDURE — 76882 US LMTD JT/FCL EVL NVASC XTR: CPT | Mod: 26,RT,, | Performed by: RADIOLOGY

## 2023-10-19 PROCEDURE — 76882 US SOFT TISSUE AXILLA, RIGHT: ICD-10-PCS | Mod: 26,RT,, | Performed by: RADIOLOGY

## 2023-10-19 PROCEDURE — 76882 US LMTD JT/FCL EVL NVASC XTR: CPT | Mod: TC,RT

## 2023-10-20 ENCOUNTER — TELEPHONE (OUTPATIENT)
Dept: FAMILY MEDICINE | Facility: CLINIC | Age: 78
End: 2023-10-20
Payer: MEDICARE

## 2023-10-20 NOTE — TELEPHONE ENCOUNTER
----- Message from Kellie Villagomez LPN sent at 10/20/2023 11:14 AM CDT -----    ----- Message -----  From: Raghavendra Lui MD  Sent: 10/19/2023   4:17 PM CDT  To: [facility] Staff    Patient has normal axillary lymph nodes.

## 2023-11-29 ENCOUNTER — OFFICE VISIT (OUTPATIENT)
Dept: FAMILY MEDICINE | Facility: CLINIC | Age: 78
End: 2023-11-29
Payer: MEDICARE

## 2023-11-29 VITALS
HEART RATE: 62 BPM | SYSTOLIC BLOOD PRESSURE: 136 MMHG | DIASTOLIC BLOOD PRESSURE: 82 MMHG | OXYGEN SATURATION: 99 % | WEIGHT: 133 LBS | TEMPERATURE: 98 F | RESPIRATION RATE: 18 BRPM | BODY MASS INDEX: 24.48 KG/M2 | HEIGHT: 62 IN

## 2023-11-29 DIAGNOSIS — E78.5 HYPERLIPIDEMIA, UNSPECIFIED HYPERLIPIDEMIA TYPE: ICD-10-CM

## 2023-11-29 DIAGNOSIS — E78.9 LIPID DISORDER: ICD-10-CM

## 2023-11-29 DIAGNOSIS — E53.8 B12 DEFICIENCY: ICD-10-CM

## 2023-11-29 DIAGNOSIS — I10 ESSENTIAL HYPERTENSION: Primary | ICD-10-CM

## 2023-11-29 DIAGNOSIS — G47.30 SLEEP APNEA, UNSPECIFIED TYPE: ICD-10-CM

## 2023-11-29 PROCEDURE — 99213 OFFICE O/P EST LOW 20 MIN: CPT | Mod: ,,, | Performed by: FAMILY MEDICINE

## 2023-11-29 PROCEDURE — 99213 PR OFFICE/OUTPT VISIT, EST, LEVL III, 20-29 MIN: ICD-10-PCS | Mod: ,,, | Performed by: FAMILY MEDICINE

## 2023-11-29 RX ORDER — PANTOPRAZOLE SODIUM 40 MG/1
40 TABLET, DELAYED RELEASE ORAL DAILY
COMMUNITY
Start: 2023-10-10

## 2023-11-29 NOTE — PROGRESS NOTES
Jennifer Hale is a 78 y.o. female seen today for follow-up on her hyperlipidemia history B12 deficiency and sleep apnea.  Patient requests a follow-up with sleep medicine to see if she still needs to use her equipment.  Patient will like a home sleep study if possible.  Patient also is not fasting but will come in tomorrow for fasting blood work to follow-up on her B12 deficiency and elevated cholesterol.  Overall she is doing well with no other complaints currently.  Patient is up-to-date on her flu vaccination and will check her shot records at home regarding her Prevnar 20 and let us know.    Past Medical History:   Diagnosis Date    Basal cell carcinoma     Cardiac murmur     GERD (gastroesophageal reflux disease)     Hyperlipidemia     Hypertension      Family History   Problem Relation Age of Onset    Lymphoma Mother     Hypertension Mother     Lymphoma Father     Lymphoma Sister     Lymphoma Brother     Colon cancer Maternal Grandfather      Current Outpatient Medications on File Prior to Visit   Medication Sig Dispense Refill    ascorbic acid, vitamin C, (VITAMIN C) 500 MG tablet Vitamin C 500 MG Oral Tablet QTY: 0 tablet Days: 0 Refills: 0  Written: 05/11/22 Patient Instructions:      atorvastatin (LIPITOR) 10 MG tablet TAKE 1 TABLET(10 MG) BY MOUTH EVERY DAY 90 tablet 3    calcium citrate-vitamin D3 315-200 mg (CITRACAL+D) 315 mg-5 mcg (200 unit) per tablet Take 1 tablet by mouth once daily.      cholecalciferol, vitamin D3, (VITAMIN D3) 50 mcg (2,000 unit) Cap capsule Vitamin D3 50 MCG (2000 UT) Oral Capsule QTY: 0 capsule Days: 0 Refills: 0  Written: 05/11/22 Patient Instructions:      multivitamin capsule Take 1 capsule by mouth once daily.      omega 3-dha-epa-fish oil (FISH OIL) 60- mg Cap 1 capsule.      potassium chloride SA (K-DUR,KLOR-CON M) 10 MEQ tablet Take 1 tablet (10 mEq total) by mouth once daily. 90 tablet 1    triamterene-hydrochlorothiazide 37.5-25 mg (MAXZIDE-25) 37.5-25 mg per  tablet TAKE 1 TABLET BY MOUTH EVERY DAY 90 tablet 1    estradioL (ESTRACE) 0.01 % (0.1 mg/gram) vaginal cream Place 1 g vaginally once daily. 42.5 g 1    pantoprazole (PROTONIX) 40 MG tablet Take 40 mg by mouth once daily.      [DISCONTINUED] tolterodine (DETROL LA) 4 MG 24 hr capsule Take 1 capsule (4 mg total) by mouth once daily. (Patient not taking: Reported on 1/12/2022) 30 capsule 2     No current facility-administered medications on file prior to visit.     Immunization History   Administered Date(s) Administered    COVID-19, MRNA, LN-S, PF (MODERNA FULL 0.5 ML DOSE) 03/01/2021, 11/05/2021, 07/01/2022    COVID-19, mRNA, LNP-S, bivalent booster, PF (Moderna Omicron)12 + YEARS 12/16/2022    Influenza 10/01/2021       Review of Systems   Constitutional:  Negative for fever, malaise/fatigue and weight loss.   Respiratory:  Negative for shortness of breath.    Cardiovascular:  Negative for chest pain and palpitations.   Gastrointestinal:  Negative for nausea and vomiting.   Psychiatric/Behavioral:  Negative for depression.         Vitals:    11/29/23 1300   BP: 136/82   Pulse: 62   Resp: 18   Temp: 97.9 °F (36.6 °C)       Physical Exam  Vitals reviewed.   Constitutional:       Appearance: Normal appearance.   HENT:      Head: Normocephalic.   Eyes:      Extraocular Movements: Extraocular movements intact.      Conjunctiva/sclera: Conjunctivae normal.      Pupils: Pupils are equal, round, and reactive to light.   Neck:      Thyroid: No thyroid mass or thyromegaly.   Cardiovascular:      Rate and Rhythm: Normal rate and regular rhythm.      Heart sounds: Normal heart sounds. No murmur heard.     No gallop.   Pulmonary:      Effort: Pulmonary effort is normal. No respiratory distress.      Breath sounds: Normal breath sounds. No wheezing or rales.   Skin:     General: Skin is warm and dry.      Coloration: Skin is not jaundiced or pale.   Neurological:      Mental Status: She is alert.   Psychiatric:         Mood and  Affect: Mood normal.         Behavior: Behavior normal.         Thought Content: Thought content normal.         Judgment: Judgment normal.          Assessment and Plan  1. Essential hypertension  -     CBC Auto Differential; Future; Expected date: 12/06/2023  -     Comprehensive Metabolic Panel; Future; Expected date: 12/06/2023  -     Lipid Panel; Future; Expected date: 12/06/2023    2. Lipid disorder  -     CBC Auto Differential; Future; Expected date: 12/06/2023  -     Comprehensive Metabolic Panel; Future; Expected date: 12/06/2023  -     Lipid Panel; Future; Expected date: 12/06/2023    3. Sleep apnea, unspecified type  -     Ambulatory referral/consult to Sleep Disorders; Future; Expected date: 12/06/2023    4. Hyperlipidemia, unspecified hyperlipidemia type  -     Lipid Panel; Future; Expected date: 11/30/2023    5. B12 deficiency  -     Homocysteine, Serum; Future; Expected date: 11/30/2023  -     Vitamin B12; Future; Expected date: 11/30/2023             Return to clinic in 6 months or as needed once her lab work is in.    Health Maintenance Topics with due status: Not Due       Topic Last Completion Date    Lipid Panel 04/27/2021    DEXA Scan 09/07/2023

## 2023-12-01 ENCOUNTER — TELEPHONE (OUTPATIENT)
Dept: FAMILY MEDICINE | Facility: CLINIC | Age: 78
End: 2023-12-01
Payer: MEDICARE

## 2023-12-06 DIAGNOSIS — E87.6 HYPOKALEMIA: ICD-10-CM

## 2023-12-06 RX ORDER — POTASSIUM CHLORIDE 750 MG/1
10 TABLET, EXTENDED RELEASE ORAL DAILY
Qty: 90 TABLET | Refills: 1 | Status: CANCELLED | OUTPATIENT
Start: 2023-12-06

## 2023-12-09 DIAGNOSIS — Z71.89 COMPLEX CARE COORDINATION: ICD-10-CM

## 2024-01-25 ENCOUNTER — OFFICE VISIT (OUTPATIENT)
Dept: FAMILY MEDICINE | Facility: CLINIC | Age: 79
End: 2024-01-25
Payer: MEDICARE

## 2024-01-25 VITALS
HEIGHT: 62 IN | RESPIRATION RATE: 18 BRPM | DIASTOLIC BLOOD PRESSURE: 69 MMHG | HEART RATE: 67 BPM | WEIGHT: 133 LBS | OXYGEN SATURATION: 97 % | SYSTOLIC BLOOD PRESSURE: 115 MMHG | TEMPERATURE: 98 F | BODY MASS INDEX: 24.48 KG/M2

## 2024-01-25 DIAGNOSIS — R53.83 FATIGUE, UNSPECIFIED TYPE: Primary | ICD-10-CM

## 2024-01-25 DIAGNOSIS — R01.1 HEART MURMUR: ICD-10-CM

## 2024-01-25 LAB
ANION GAP SERPL CALCULATED.3IONS-SCNC: 12 MMOL/L (ref 7–16)
BUN SERPL-MCNC: 16 MG/DL (ref 7–18)
BUN/CREAT SERPL: 21 (ref 6–20)
CALCIUM SERPL-MCNC: 9.6 MG/DL (ref 8.5–10.1)
CHLORIDE SERPL-SCNC: 103 MMOL/L (ref 98–107)
CO2 SERPL-SCNC: 27 MMOL/L (ref 21–32)
CREAT SERPL-MCNC: 0.77 MG/DL (ref 0.55–1.02)
EGFR (NO RACE VARIABLE) (RUSH/TITUS): 79 ML/MIN/1.73M2
GLUCOSE SERPL-MCNC: 88 MG/DL (ref 74–106)
POTASSIUM SERPL-SCNC: 3.6 MMOL/L (ref 3.5–5.1)
SODIUM SERPL-SCNC: 138 MMOL/L (ref 136–145)
T4 FREE SERPL-MCNC: 1.04 NG/DL (ref 0.76–1.46)
TSH SERPL DL<=0.005 MIU/L-ACNC: 1.6 UIU/ML (ref 0.36–3.74)

## 2024-01-25 PROCEDURE — 93005 ELECTROCARDIOGRAM TRACING: CPT | Mod: RHCUB | Performed by: FAMILY MEDICINE

## 2024-01-25 PROCEDURE — 84439 ASSAY OF FREE THYROXINE: CPT | Mod: ,,, | Performed by: CLINICAL MEDICAL LABORATORY

## 2024-01-25 PROCEDURE — 93010 ELECTROCARDIOGRAM REPORT: CPT | Mod: ,,, | Performed by: FAMILY MEDICINE

## 2024-01-25 PROCEDURE — 80048 BASIC METABOLIC PNL TOTAL CA: CPT | Mod: ,,, | Performed by: CLINICAL MEDICAL LABORATORY

## 2024-01-25 PROCEDURE — 84443 ASSAY THYROID STIM HORMONE: CPT | Mod: ,,, | Performed by: CLINICAL MEDICAL LABORATORY

## 2024-01-25 PROCEDURE — 99213 OFFICE O/P EST LOW 20 MIN: CPT | Mod: ,,, | Performed by: FAMILY MEDICINE

## 2024-01-25 NOTE — PROGRESS NOTES
Jennifer Hale is a 78 y.o. female seen today for  follow-up on persistent and worsening fatigue.  Patient reports she does tire easily and often feels very sleepy after she tries to exert herself.  Patient is on CPAP and reports even when she hits 8 hours worth of sleep on CPAP her symptoms persist.  Patient has had some recent lab work which was within normal limits.  She does have a history of hypokalemia we discussed rechecking her potassium level patient also has a past medical history of a heart murmur and was evaluated by Cardiology approximately 2 years ago before her gallbladder surgery.    Past Medical History:   Diagnosis Date    Basal cell carcinoma     Cardiac murmur     GERD (gastroesophageal reflux disease)     Hyperlipidemia     Hypertension      Family History   Problem Relation Age of Onset    Lymphoma Mother     Hypertension Mother     Lymphoma Father     Lymphoma Sister     Lymphoma Brother     Colon cancer Maternal Grandfather      Current Outpatient Medications on File Prior to Visit   Medication Sig Dispense Refill    ascorbic acid, vitamin C, (VITAMIN C) 500 MG tablet Vitamin C 500 MG Oral Tablet QTY: 0 tablet Days: 0 Refills: 0  Written: 05/11/22 Patient Instructions:      cholecalciferol, vitamin D3, (VITAMIN D3) 50 mcg (2,000 unit) Cap capsule Vitamin D3 50 MCG (2000 UT) Oral Capsule QTY: 0 capsule Days: 0 Refills: 0  Written: 05/11/22 Patient Instructions:      multivitamin capsule Take 1 capsule by mouth once daily.      omega 3-dha-epa-fish oil (FISH OIL) 60- mg Cap 1 capsule.      atorvastatin (LIPITOR) 10 MG tablet TAKE 1 TABLET(10 MG) BY MOUTH EVERY DAY 90 tablet 3    calcium citrate-vitamin D3 315-200 mg (CITRACAL+D) 315 mg-5 mcg (200 unit) per tablet Take 1 tablet by mouth once daily.      estradioL (ESTRACE) 0.01 % (0.1 mg/gram) vaginal cream Place 1 g vaginally once daily. 42.5 g 1    pantoprazole (PROTONIX) 40 MG tablet Take 40 mg by mouth once daily.      potassium  chloride SA (K-DUR,KLOR-CON M) 10 MEQ tablet Take 1 tablet (10 mEq total) by mouth once daily. 90 tablet 1    triamterene-hydrochlorothiazide 37.5-25 mg (MAXZIDE-25) 37.5-25 mg per tablet TAKE 1 TABLET BY MOUTH EVERY DAY (Patient not taking: Reported on 1/25/2024) 90 tablet 1    [DISCONTINUED] tolterodine (DETROL LA) 4 MG 24 hr capsule Take 1 capsule (4 mg total) by mouth once daily. (Patient not taking: Reported on 1/12/2022) 30 capsule 2     No current facility-administered medications on file prior to visit.     Immunization History   Administered Date(s) Administered    COVID-19 MRNA, LN-S PF (MODERNA HALF 0.25 ML DOSE) 11/05/2021, 07/01/2022    COVID-19, MRNA, LN-S, PF (MODERNA FULL 0.5 ML DOSE) 03/01/2021    COVID-19, mRNA, LNP-S, bivalent booster, PF (Moderna Omicron)12 + YEARS 12/16/2022    Influenza 10/01/2021       Review of Systems   Constitutional:  Positive for malaise/fatigue. Negative for fever and weight loss.   Respiratory:  Negative for shortness of breath.    Cardiovascular:  Negative for chest pain and palpitations.   Gastrointestinal:  Negative for nausea and vomiting.   Psychiatric/Behavioral:  Negative for depression.         Vitals:    01/25/24 1327   BP: 115/69   Pulse: 67   Resp: 18   Temp: 97.5 °F (36.4 °C)       Physical Exam  Vitals reviewed.   Constitutional:       Appearance: Normal appearance.   HENT:      Head: Normocephalic.   Eyes:      Extraocular Movements: Extraocular movements intact.      Conjunctiva/sclera: Conjunctivae normal.      Pupils: Pupils are equal, round, and reactive to light.   Neck:      Thyroid: No thyroid mass or thyromegaly.   Cardiovascular:      Rate and Rhythm: Normal rate and regular rhythm.      Heart sounds: Normal heart sounds. No murmur heard.     No gallop.   Pulmonary:      Effort: Pulmonary effort is normal. No respiratory distress.      Breath sounds: Normal breath sounds. No wheezing or rales.   Skin:     General: Skin is warm and dry.       Coloration: Skin is not jaundiced or pale.   Neurological:      Mental Status: She is alert.   Psychiatric:         Mood and Affect: Mood normal.         Behavior: Behavior normal.         Thought Content: Thought content normal.         Judgment: Judgment normal.          Assessment and Plan  1. Fatigue, unspecified type  -     Thyroid Panel; Future; Expected date: 01/25/2024  -     POCT EKG 12-LEAD (Manually Resulted by Ordering Provider)  -     Basic Metabolic Panel; Future; Expected date: 01/25/2024    2. Heart murmur  -     POCT EKG 12-LEAD (Manually Resulted by Ordering Provider)                    EKG is normal.      Return to clinic in 1 month or as needed once her lab work is in.  If her labs are normal I may consider a cardiac follow-up or sleep apnea follow-up.    Health Maintenance Topics with due status: Not Due       Topic Last Completion Date    DEXA Scan 09/07/2023    Lipid Panel 11/30/2023

## 2024-01-26 ENCOUNTER — TELEPHONE (OUTPATIENT)
Dept: FAMILY MEDICINE | Facility: CLINIC | Age: 79
End: 2024-01-26
Payer: MEDICARE

## 2024-01-26 LAB
EKG 12-LEAD: NORMAL
PR INTERVAL: NORMAL
PRT AXES: NORMAL
QRS DURATION: NORMAL
QT/QTC: NORMAL
VENTRICULAR RATE: NORMAL

## 2024-01-26 NOTE — TELEPHONE ENCOUNTER
----- Message from Raghavendra Lui MD sent at 1/26/2024  7:49 AM CST -----  Okay, Please send letter.

## 2024-01-26 NOTE — LETTER
January 26, 2024    Jennifer Hale  [id number]  [place] [address]             [facility]  [address] [place] [address]  Phone: [phone]  Fax: [phone] Dear Ms. Hale:    Dr Lui reviewed your recent labs and is content with the results. We look forward to seeing you at your next appointment.      If you have any questions or concerns, please don't hesitate to call.    Sincerely,      Brittany Lawson LPN

## 2024-02-01 ENCOUNTER — HOSPITAL ENCOUNTER (OUTPATIENT)
Dept: RADIOLOGY | Facility: HOSPITAL | Age: 79
Discharge: HOME | End: 2024-02-01
Attending: FAMILY MEDICINE
Payer: MEDICARE

## 2024-02-01 VITALS — HEIGHT: 62 IN | BODY MASS INDEX: 23 KG/M2 | WEIGHT: 125 LBS

## 2024-02-01 DIAGNOSIS — Z12.39 ENCOUNTER FOR SCREENING FOR MALIGNANT NEOPLASM OF BREAST, UNSPECIFIED SCREENING MODALITY: ICD-10-CM

## 2024-02-01 DIAGNOSIS — R92.30 DENSE BREASTS: ICD-10-CM

## 2024-02-01 DIAGNOSIS — Z12.31 ENCOUNTER FOR SCREENING MAMMOGRAM FOR MALIGNANT NEOPLASM OF BREAST: ICD-10-CM

## 2024-02-01 PROCEDURE — 77067 SCR MAMMO BI INCL CAD: CPT | Mod: TC

## 2024-02-07 DIAGNOSIS — G47.30 SLEEP APNEA, UNSPECIFIED: Primary | ICD-10-CM

## 2024-02-09 ENCOUNTER — OFFICE VISIT (OUTPATIENT)
Dept: DERMATOLOGY | Facility: CLINIC | Age: 79
End: 2024-02-09
Payer: MEDICARE

## 2024-02-09 DIAGNOSIS — Z85.828 HISTORY OF NONMELANOMA SKIN CANCER: ICD-10-CM

## 2024-02-09 DIAGNOSIS — L73.8 SEBACEOUS HYPERPLASIA: ICD-10-CM

## 2024-02-09 DIAGNOSIS — L82.0 SEBORRHEIC KERATOSES, INFLAMED: Primary | ICD-10-CM

## 2024-02-09 DIAGNOSIS — L82.1 SEBORRHEIC KERATOSES: ICD-10-CM

## 2024-02-09 PROCEDURE — 99213 OFFICE O/P EST LOW 20 MIN: CPT | Mod: 25,,, | Performed by: STUDENT IN AN ORGANIZED HEALTH CARE EDUCATION/TRAINING PROGRAM

## 2024-02-09 PROCEDURE — 17110 DESTRUCTION B9 LES UP TO 14: CPT | Mod: ,,, | Performed by: STUDENT IN AN ORGANIZED HEALTH CARE EDUCATION/TRAINING PROGRAM

## 2024-02-09 NOTE — PROGRESS NOTES
[facility]  Ronn Sheikh MD    [address] [place] [address]  [phone]    Fax: [phone]    Patient Name: Jennifer Hale  Medical Record Number: [id number]  PCP: Raghavendra Lui MD  Age: 79 y.o. : 1945  Contact: [phone] (home)     History of Present Illness:     Jennifer Hale is a 79 y.o.  female here for follow up of NMSC. Today, she is c/o of skin lesion of R nasal sidewall. She has had associated epiphora, and is concerned her CPAP mask could be contributing. She also has pruritic lesions on frontal scalp.     The patient has no other concerns today.    Review of Systems:     Unremarkable other than mentioned above.     Physical Exam:     General: Relaxed, oriented, alert    Skin examination of the scalp, face, neck, chest, back, abdomen, upper extremities and lower extremities were normal except for as listed below      Assessment and Plan:     1. History of NMSC   Well-healed scar on L cheek  No e/o recurrence   Recommend sunscreen and good photoprotection       2. Sebaceous Hyperplasia  - orange-yellow papules with telangiectasia  - Patient was educated on the benign nature of these.     3. Seborrheic keratoses   - brown stuck on appearing papules/plaques  - patient educated on benign nature. No treatment necessary unless they become irritated or inflamed     4. Irritated Seborrheic Keratoses (L82.0)  Stuck-on inflamed papules with crust located on the frontal scalp  Associated diagnoses: Pruritus and Cutaneous Inflammation    Plan: Liquid Nitrogen.  A total of 2 lesions were treated with liquid nitrogen, located on the above listed location.  This procedure was medically necessary because the lesions that were treated were: irritated and itchy. The  patient's consent was obtained including but not limited to risks of crusting, scabbing, blistering, scarring, darker  or lighter pigmentary change, recurrence, incomplete removal and  infection.    5. Neoplasm of Uncertain Behavior   - erythematous papule located on the R nasal sidewall  Ddx includes: Inflammatory papule vs sebaceous hyperplasia vs BCC     - will monitor. Favor benign clinically         Return to clinic in 1 year.     AVS printed with patient instructions     Ronn Sheikh MD   Mohs Surgery/Dermatologic Oncology  Dermatology

## 2024-02-11 ENCOUNTER — OFFICE VISIT (OUTPATIENT)
Dept: FAMILY MEDICINE | Facility: CLINIC | Age: 79
End: 2024-02-11
Payer: MEDICARE

## 2024-02-11 VITALS
OXYGEN SATURATION: 97 % | DIASTOLIC BLOOD PRESSURE: 70 MMHG | SYSTOLIC BLOOD PRESSURE: 115 MMHG | TEMPERATURE: 99 F | WEIGHT: 135 LBS | HEART RATE: 74 BPM | BODY MASS INDEX: 24.84 KG/M2 | HEIGHT: 62 IN | RESPIRATION RATE: 18 BRPM

## 2024-02-11 DIAGNOSIS — J02.9 SORE THROAT: ICD-10-CM

## 2024-02-11 DIAGNOSIS — J06.9 VIRAL URI: Primary | ICD-10-CM

## 2024-02-11 DIAGNOSIS — Z20.828 EXPOSURE TO VIRAL DISEASE: ICD-10-CM

## 2024-02-11 DIAGNOSIS — Z11.52 ENCOUNTER FOR SCREENING FOR COVID-19: ICD-10-CM

## 2024-02-11 PROBLEM — M85.80 OSTEOPENIA: Status: ACTIVE | Noted: 2022-05-23

## 2024-02-11 PROBLEM — E78.5 HYPERLIPIDEMIA: Status: ACTIVE | Noted: 2022-05-23

## 2024-02-11 PROBLEM — I10 HYPERTENSION: Status: ACTIVE | Noted: 2022-05-23

## 2024-02-11 PROBLEM — Z79.890 HORMONE REPLACEMENT THERAPY (HRT): Status: ACTIVE | Noted: 2022-05-23

## 2024-02-11 PROBLEM — E55.9 VITAMIN D DEFICIENCY: Status: ACTIVE | Noted: 2022-05-23

## 2024-02-11 LAB
CTP QC/QA: YES
FLUAV AG NPH QL: NEGATIVE
FLUBV AG NPH QL: NEGATIVE
S PYO RRNA THROAT QL PROBE: NEGATIVE
SARS-COV-2 RDRP RESP QL NAA+PROBE: NEGATIVE

## 2024-02-11 PROCEDURE — 87081 CULTURE SCREEN ONLY: CPT | Mod: ,,, | Performed by: CLINICAL MEDICAL LABORATORY

## 2024-02-11 PROCEDURE — 99214 OFFICE O/P EST MOD 30 MIN: CPT | Mod: ,,, | Performed by: NURSE PRACTITIONER

## 2024-02-11 PROCEDURE — 87804 INFLUENZA ASSAY W/OPTIC: CPT | Mod: 59,QW,RHCUB | Performed by: NURSE PRACTITIONER

## 2024-02-11 PROCEDURE — 87880 STREP A ASSAY W/OPTIC: CPT | Mod: RHCUB | Performed by: NURSE PRACTITIONER

## 2024-02-11 PROCEDURE — 87635 SARS-COV-2 COVID-19 AMP PRB: CPT | Mod: RHCUB | Performed by: NURSE PRACTITIONER

## 2024-02-11 RX ORDER — BENZONATATE 200 MG/1
200 CAPSULE ORAL 3 TIMES DAILY PRN
Qty: 30 CAPSULE | Refills: 0 | Status: SHIPPED | OUTPATIENT
Start: 2024-02-11 | End: 2024-02-21

## 2024-02-11 NOTE — PATIENT INSTRUCTIONS
You may take Coricidin HBP over the counter for cough/congestion  I will call with strep culture results  Tessalon perles were sent in for sore throat

## 2024-02-11 NOTE — PROGRESS NOTES
"Subjective:       Patient ID: Jennifer Hale is a 79 y.o. female.    Chief Complaint: Sore Throat (Onset of Thursday; progressively getting worse) and Otalgia (Right )    Presents to clinic as above. Denies fever. Also has mild nasal congestion. No SOB. Her main complaint is sore throat and ear pain.       Review of Systems   Constitutional: Negative.    HENT:  Positive for congestion, ear pain and sore throat.    Respiratory:  Positive for cough.    Cardiovascular: Negative.    Musculoskeletal: Negative.    Neurological:  Positive for headaches.          Reviewed family, medical, surgical, and social history.    Objective:      /70   Pulse 74   Temp 98.5 °F (36.9 °C) (Oral)   Resp 18   Ht 5' 2" (1.575 m)   Wt 61.2 kg (135 lb)   SpO2 97%   BMI 24.69 kg/m²   Physical Exam  Vitals and nursing note reviewed.   Constitutional:       General: She is not in acute distress.     Appearance: Normal appearance. She is not ill-appearing, toxic-appearing or diaphoretic.   HENT:      Head: Normocephalic.      Right Ear: Hearing, tympanic membrane, ear canal and external ear normal.      Left Ear: Hearing, tympanic membrane, ear canal and external ear normal.      Ears:      Comments: Both ears normal.      Nose: Mucosal edema, congestion and rhinorrhea present. Rhinorrhea is clear.      Right Turbinates: Enlarged and swollen.      Left Turbinates: Enlarged and swollen.      Right Sinus: No maxillary sinus tenderness or frontal sinus tenderness.      Left Sinus: No maxillary sinus tenderness or frontal sinus tenderness.      Mouth/Throat:      Lips: Pink.      Mouth: Mucous membranes are moist.      Pharynx: Uvula midline. Posterior oropharyngeal erythema present. No pharyngeal swelling, oropharyngeal exudate or uvula swelling.      Tonsils: No tonsillar exudate or tonsillar abscesses. 1+ on the right. 1+ on the left.      Comments: No exudate. Mild redness.   Cardiovascular:      Rate and Rhythm: Normal rate and " regular rhythm.      Heart sounds: Normal heart sounds.   Pulmonary:      Effort: Pulmonary effort is normal.      Breath sounds: Normal breath sounds.   Skin:     General: Skin is warm and dry.   Neurological:      Mental Status: She is alert.   Psychiatric:         Mood and Affect: Mood normal.         Behavior: Behavior normal.         Thought Content: Thought content normal.         Judgment: Judgment normal.            Office Visit on 02/11/2024   Component Date Value Ref Range Status    Rapid Influenza A Ag 02/11/2024 Negative  Negative Final    Rapid Influenza B Ag 02/11/2024 Negative  Negative Final     Acceptable 02/11/2024 Yes   Final    POC Rapid COVID 02/11/2024 Negative  Negative Final     Acceptable 02/11/2024 Yes   Final    Rapid Strep A Screen 02/11/2024 Negative  Negative Final     Acceptable 02/11/2024 Yes   Final      Assessment:       1. Viral URI    2. Encounter for screening for COVID-19    3. Exposure to viral disease    4. Sore throat        Plan:       Viral URI    Encounter for screening for COVID-19  -     POCT COVID-19 Rapid Screening    Exposure to viral disease  -     POCT Influenza A/B  -     POCT rapid strep A    Sore throat  -     Strep A culture, throat; Future; Expected date: 02/11/2024  -     benzonatate (TESSALON) 200 MG capsule; Take 1 capsule (200 mg total) by mouth 3 (three) times daily as needed (sore throat or cough).  Dispense: 30 capsule; Refill: 0    You may take Coricidin HBP over the counter for cough/congestion  I will call with strep culture results  Tessalon perles were sent in for sore throat  RTC with new or worsening s/s and prn          Risks, benefits, and side effects were discussed with the patient. All questions were answered to the fullest satisfaction of the patient, and pt verbalized understanding and agreement to treatment plan. Pt was to call with any new or worsening symptoms, or present to the ER.

## 2024-02-13 LAB — DEPRECATED S PYO AG THROAT QL EIA: NORMAL

## 2024-02-14 ENCOUNTER — TELEPHONE (OUTPATIENT)
Dept: FAMILY MEDICINE | Facility: CLINIC | Age: 79
End: 2024-02-14
Payer: MEDICARE

## 2024-02-14 ENCOUNTER — PROCEDURE VISIT (OUTPATIENT)
Dept: SLEEP MEDICINE | Facility: HOSPITAL | Age: 79
End: 2024-02-14
Attending: INTERNAL MEDICINE
Payer: MEDICARE

## 2024-02-14 DIAGNOSIS — G47.30 SLEEP APNEA, UNSPECIFIED: ICD-10-CM

## 2024-02-14 PROCEDURE — G0399 HOME SLEEP TEST/TYPE 3 PORTA: HCPCS | Mod: PO

## 2024-02-26 ENCOUNTER — OFFICE VISIT (OUTPATIENT)
Dept: FAMILY MEDICINE | Facility: CLINIC | Age: 79
End: 2024-02-26
Payer: MEDICARE

## 2024-02-26 VITALS
TEMPERATURE: 98 F | BODY MASS INDEX: 24.84 KG/M2 | DIASTOLIC BLOOD PRESSURE: 72 MMHG | HEIGHT: 62 IN | HEART RATE: 61 BPM | RESPIRATION RATE: 18 BRPM | SYSTOLIC BLOOD PRESSURE: 118 MMHG | WEIGHT: 135 LBS | OXYGEN SATURATION: 98 %

## 2024-02-26 DIAGNOSIS — J01.00 ACUTE NON-RECURRENT MAXILLARY SINUSITIS: Primary | ICD-10-CM

## 2024-02-26 PROCEDURE — 96372 THER/PROPH/DIAG INJ SC/IM: CPT | Mod: ,,, | Performed by: FAMILY MEDICINE

## 2024-02-26 PROCEDURE — 99213 OFFICE O/P EST LOW 20 MIN: CPT | Mod: ,,, | Performed by: FAMILY MEDICINE

## 2024-02-26 RX ORDER — AMOXICILLIN AND CLAVULANATE POTASSIUM 875; 125 MG/1; MG/1
1 TABLET, FILM COATED ORAL 2 TIMES DAILY
Qty: 20 TABLET | Refills: 0 | Status: SHIPPED | OUTPATIENT
Start: 2024-02-26 | End: 2024-06-19

## 2024-02-26 RX ORDER — CEFUROXIME AXETIL 500 MG/1
500 TABLET ORAL EVERY 12 HOURS
Qty: 20 TABLET | Refills: 0 | Status: SHIPPED | OUTPATIENT
Start: 2024-02-26 | End: 2024-02-26 | Stop reason: ALTCHOICE

## 2024-02-26 RX ORDER — CEFTRIAXONE 1 G/1
1 INJECTION, POWDER, FOR SOLUTION INTRAMUSCULAR; INTRAVENOUS
Status: COMPLETED | OUTPATIENT
Start: 2024-02-26 | End: 2024-02-26

## 2024-02-26 RX ORDER — FLUTICASONE PROPIONATE 50 MCG
1 SPRAY, SUSPENSION (ML) NASAL DAILY
Qty: 48 G | Refills: 3 | Status: SHIPPED | OUTPATIENT
Start: 2024-02-26

## 2024-02-26 RX ORDER — BETAMETHASONE SODIUM PHOSPHATE AND BETAMETHASONE ACETATE 3; 3 MG/ML; MG/ML
6 INJECTION, SUSPENSION INTRA-ARTICULAR; INTRALESIONAL; INTRAMUSCULAR; SOFT TISSUE
Status: COMPLETED | OUTPATIENT
Start: 2024-02-26 | End: 2024-02-26

## 2024-02-26 RX ADMIN — BETAMETHASONE SODIUM PHOSPHATE AND BETAMETHASONE ACETATE 6 MG: 3; 3 INJECTION, SUSPENSION INTRA-ARTICULAR; INTRALESIONAL; INTRAMUSCULAR; SOFT TISSUE at 11:02

## 2024-02-26 RX ADMIN — CEFTRIAXONE 1 G: 1 INJECTION, POWDER, FOR SOLUTION INTRAMUSCULAR; INTRAVENOUS at 11:02

## 2024-02-26 NOTE — PROGRESS NOTES
Jennifer Hale is a 79 y.o. female seen today for follow-up on her fatigue.  Patient reports he is feeling slightly better but was recently seen by immediate care and diagnosed with a viral upper respiratory infection.  Patient continues to have sinus pressure and sore throat and mild malaise.  Patient's sleep study follow-up is scheduled for sometime next week.      Past Medical History:   Diagnosis Date    Basal cell carcinoma     Cardiac murmur     GERD (gastroesophageal reflux disease)     Hyperlipidemia     Hypertension      Family History   Problem Relation Age of Onset    Breast cancer Mother     Lymphoma Mother     Hypertension Mother     Lymphoma Father     Breast cancer Sister     Lymphoma Sister     Colon cancer Maternal Grandfather     Lymphoma Brother      Current Outpatient Medications on File Prior to Visit   Medication Sig Dispense Refill    ascorbic acid, vitamin C, (VITAMIN C) 500 MG tablet Vitamin C 500 MG Oral Tablet QTY: 0 tablet Days: 0 Refills: 0  Written: 05/11/22 Patient Instructions:      cholecalciferol, vitamin D3, (VITAMIN D3) 50 mcg (2,000 unit) Cap capsule Vitamin D3 50 MCG (2000 UT) Oral Capsule QTY: 0 capsule Days: 0 Refills: 0  Written: 05/11/22 Patient Instructions:      multivitamin capsule Take 1 capsule by mouth once daily.      omega 3-dha-epa-fish oil (FISH OIL) 60- mg Cap 1 capsule.      pantoprazole (PROTONIX) 40 MG tablet Take 40 mg by mouth once daily.      triamterene-hydrochlorothiazide 37.5-25 mg (MAXZIDE-25) 37.5-25 mg per tablet TAKE 1 TABLET BY MOUTH EVERY DAY 90 tablet 1    [DISCONTINUED] tolterodine (DETROL LA) 4 MG 24 hr capsule Take 1 capsule (4 mg total) by mouth once daily. (Patient not taking: Reported on 1/12/2022) 30 capsule 2     No current facility-administered medications on file prior to visit.     Immunization History   Administered Date(s) Administered    COVID-19 MRNA, LN-S PF (MODERNA HALF 0.25 ML DOSE) 11/05/2021, 07/01/2022    COVID-19, MRNA,  LN-S, PF (MODERNA FULL 0.5 ML DOSE) 03/01/2021    COVID-19, mRNA, LNP-S, bivalent booster, PF (Moderna Omicron)12 + YEARS 12/16/2022    Influenza 10/01/2021       Review of Systems   Constitutional:  Positive for malaise/fatigue. Negative for fever and weight loss.   HENT:  Positive for congestion.    Respiratory:  Negative for shortness of breath.    Cardiovascular:  Negative for chest pain and palpitations.   Gastrointestinal:  Negative for nausea and vomiting.   Psychiatric/Behavioral:  Negative for depression.         Vitals:    02/26/24 1111   BP: 118/72   Pulse: 61   Resp: 18   Temp: 97.8 °F (36.6 °C)       Physical Exam  Vitals reviewed.   Constitutional:       Appearance: Normal appearance.   HENT:      Head: Normocephalic.      Nose:      Right Turbinates: Swollen.      Left Turbinates: Swollen.      Right Sinus: Maxillary sinus tenderness and frontal sinus tenderness present.      Left Sinus: Maxillary sinus tenderness and frontal sinus tenderness present.   Eyes:      Extraocular Movements: Extraocular movements intact.      Conjunctiva/sclera: Conjunctivae normal.      Pupils: Pupils are equal, round, and reactive to light.   Neck:      Thyroid: No thyroid mass or thyromegaly.   Cardiovascular:      Rate and Rhythm: Normal rate and regular rhythm.      Heart sounds: Normal heart sounds. No murmur heard.     No gallop.   Pulmonary:      Effort: Pulmonary effort is normal. No respiratory distress.      Breath sounds: Normal breath sounds. No wheezing or rales.   Skin:     General: Skin is warm and dry.      Coloration: Skin is not jaundiced or pale.   Neurological:      Mental Status: She is alert.   Psychiatric:         Mood and Affect: Mood normal.         Behavior: Behavior normal.         Thought Content: Thought content normal.         Judgment: Judgment normal.          Assessment and Plan  1. Acute non-recurrent maxillary sinusitis  -     Discontinue: cefUROXime (CEFTIN) 500 MG tablet; Take 1 tablet  (500 mg total) by mouth every 12 (twelve) hours.  Dispense: 20 tablet; Refill: 0  -     betamethasone acetate-betamethasone sodium phosphate injection 6 mg  -     amoxicillin-clavulanate 875-125mg (AUGMENTIN) 875-125 mg per tablet; Take 1 tablet by mouth 2 (two) times daily.  Dispense: 20 tablet; Refill: 0  -     fluticasone propionate (FLONASE) 50 mcg/actuation nasal spray; 1 spray (50 mcg total) by Each Nostril route once daily.  Dispense: 48 g; Refill: 3  -     cefTRIAXone injection 1 g             Return to clinic if symptoms worsen or persist or for her routine appointment.    Health Maintenance Topics with due status: Not Due       Topic Last Completion Date    DEXA Scan 09/07/2023    Lipid Panel 11/30/2023

## 2024-03-05 NOTE — PROGRESS NOTES
[facility]  Ronn Sheikh MD    [address] [place] [address]  [phone]    Fax: [phone]    Patient Name: Jennifer Hale  Medical Record Number: [id number]  PCP: Gagan Chavira DO  Age: 77 y.o. : 1945  Contact: [phone] (home)     History of Present Illness:     Jennifer Hale is a 77 y.o.  female here for follow up of history of BCC (left cheek removed  in [place]). Patient last seen by dermatology 22. She has a few growing lesions on her face and forehead. The lesion on L cheek we were monitoring appears to have resolved.     The patient has no other concerns today.    Review of Systems:     Unremarkable other than mentioned above.     Physical Exam:     General: Relaxed, oriented, alert    Skin examination of the scalp, face, neck, chest, back, abdomen, upper extremities and lower extremities were normal except for as listed below      Assessment and Plan:     1. History of NMSC   Well-healed scar on left cheek   No e/o recurrence   Recommend sunscreen and good photoprotection       2. Seborrheic keratoses   - brown stuck on appearing papules/plaques  - patient educated on benign nature. No treatment necessary unless they become irritated or inflamed       3. Cherry Angiomas  - Scattered bright pink papules    Plan: Counseling  I counseled the patient regarding the diagnosis including that cherry angiomas are benign skin growths requiring no treatment. Patients get more as they age.      4. Benign Nevus   - Dome shaped regular papule    Plan: Reassurance.    Counseling.  Monthly self-skin checks to monitor for any changes in moles are recommended.  Contact Office if: Any moles change in size, shape or color; itch, burn or bleed.        Return to clinic in 6 months.     AVS printed with patient instructions     Ronn Sheikh MD   Mohs Surgery/Dermatologic Oncology  Dermatology      Nsaids Pregnancy And Lactation Text: These medications are considered safe up to 30 weeks gestation. It is excreted in breast milk. Rifampin Pregnancy And Lactation Text: This medication is Pregnancy Category C and it isn't know if it is safe during pregnancy. It is also excreted in breast milk and should not be used if you are breast feeding. Erivedge Pregnancy And Lactation Text: This medication is Pregnancy Category X and is absolutely contraindicated during pregnancy. It is unknown if it is excreted in breast milk. Gabapentin Counseling: I discussed with the patient the risks of gabapentin including but not limited to dizziness, somnolence, fatigue and ataxia. Zyclara Pregnancy And Lactation Text: This medication is Pregnancy Category C. It is unknown if this medication is excreted in breast milk. Eucrisa Pregnancy And Lactation Text: This medication has not been assigned a Pregnancy Risk Category but animal studies failed to show danger with the topical medication. It is unknown if the medication is excreted in breast milk. Birth Control Pills Counseling: Birth Control Pill Counseling: I discussed with the patient the potential side effects of OCPs including but not limited to increased risk of stroke, heart attack, thrombophlebitis, deep venous thrombosis, hepatic adenomas, breast changes, GI upset, headaches, and depression.  The patient verbalized understanding of the proper use and possible adverse effects of OCPs. All of the patient's questions and concerns were addressed. Prednisone Pregnancy And Lactation Text: This medication is Pregnancy Category C and it isn't know if it is safe during pregnancy. This medication is excreted in breast milk. Odomzo Counseling- I discussed with the patient the risks of Odomzo including but not limited to nausea, vomiting, diarrhea, constipation, weight loss, changes in the sense of taste, decreased appetite, muscle spasms, and hair loss.  The patient verbalized understanding of the proper use and possible adverse effects of Odomzo.  All of the patient's questions and concerns were addressed. Tremfya Pregnancy And Lactation Text: The risk during pregnancy and breastfeeding is uncertain with this medication. Topical Retinoid counseling:  Patient advised to apply a pea-sized amount only at bedtime and wait 30 minutes after washing their face before applying.  If too drying, patient may add a non-comedogenic moisturizer. The patient verbalized understanding of the proper use and possible adverse effects of retinoids.  All of the patient's questions and concerns were addressed. Spironolactone Pregnancy And Lactation Text: This medication can cause feminization of the male fetus and should be avoided during pregnancy. The active metabolite is also found in breast milk. Ketoconazole Counseling:   Patient counseled regarding improving absorption with orange juice.  Adverse effects include but are not limited to breast enlargement, headache, diarrhea, nausea, upset stomach, liver function test abnormalities, taste disturbance, and stomach pain.  There is a rare possibility of liver failure that can occur when taking ketoconazole. The patient understands that monitoring of LFTs may be required, especially at baseline. The patient verbalized understanding of the proper use and possible adverse effects of ketoconazole.  All of the patient's questions and concerns were addressed. Dapsone Pregnancy And Lactation Text: This medication is Pregnancy Category C and is not considered safe during pregnancy or breast feeding. Ivermectin Counseling:  Patient instructed to take medication on an empty stomach with a full glass of water.  Patient informed of potential adverse effects including but not limited to nausea, diarrhea, dizziness, itching, and swelling of the extremities or lymph nodes.  The patient verbalized understanding of the proper use and possible adverse effects of ivermectin.  All of the patient's questions and concerns were addressed. Tazorac Counseling:  Patient advised that medication is irritating and drying.  Patient may need to apply sparingly and wash off after an hour before eventually leaving it on overnight.  The patient verbalized understanding of the proper use and possible adverse effects of tazorac.  All of the patient's questions and concerns were addressed. Include Pregnancy/Lactation Warning?: No Stelara Counseling:  I discussed with the patient the risks of ustekinumab including but not limited to immunosuppression, malignancy, posterior leukoencephalopathy syndrome, and serious infections.  The patient understands that monitoring is required including a PPD at baseline and must alert us or the primary physician if symptoms of infection or other concerning signs are noted. Valtrex Pregnancy And Lactation Text: this medication is Pregnancy Category B and is considered safe during pregnancy. This medication is not directly found in breast milk but it's metabolite acyclovir is present. Drysol Counseling:  I discussed with the patient the risks of drysol/aluminum chloride including but not limited to skin rash, itching, irritation, burning. Methotrexate Counseling:  Patient counseled regarding adverse effects of methotrexate including but not limited to nausea, vomiting, abnormalities in liver function tests. Patients may develop mouth sores, rash, diarrhea, and abnormalities in blood counts. The patient understands that monitoring is required including LFT's and blood counts.  There is a rare possibility of scarring of the liver and lung problems that can occur when taking methotrexate. Persistent nausea, loss of appetite, pale stools, dark urine, cough, and shortness of breath should be reported immediately. Patient advised to discontinue methotrexate treatment at least three months before attempting to become pregnant.  I discussed the need for folate supplements while taking methotrexate.  These supplements can decrease side effects during methotrexate treatment. The patient verbalized understanding of the proper use and possible adverse effects of methotrexate.  All of the patient's questions and concerns were addressed. Topical Sulfur Applications Counseling: Topical Sulfur Counseling: Patient counseled that this medication may cause skin irritation or allergic reactions.  In the event of skin irritation, the patient was advised to reduce the amount of the drug applied or use it less frequently.   The patient verbalized understanding of the proper use and possible adverse effects of topical sulfur application.  All of the patient's questions and concerns were addressed. Doxycycline Counseling:  Patient counseled regarding possible photosensitivity and increased risk for sunburn.  Patient instructed to avoid sunlight, if possible.  When exposed to sunlight, patients should wear protective clothing, sunglasses, and sunscreen.  The patient was instructed to call the office immediately if the following severe adverse effects occur:  hearing changes, easy bruising/bleeding, severe headache, or vision changes.  The patient verbalized understanding of the proper use and possible adverse effects of doxycycline.  All of the patient's questions and concerns were addressed. Bactrim Counseling:  I discussed with the patient the risks of sulfa antibiotics including but not limited to GI upset, allergic reaction, drug rash, diarrhea, dizziness, photosensitivity, and yeast infections.  Rarely, more serious reactions can occur including but not limited to aplastic anemia, agranulocytosis, methemoglobinemia, blood dyscrasias, liver or kidney failure, lung infiltrates or desquamative/blistering drug rashes. Xolair Counseling:  Patient informed of potential adverse effects including but not limited to fever, muscle aches, rash and allergic reactions.  The patient verbalized understanding of the proper use and possible adverse effects of Xolair.  All of the patient's questions and concerns were addressed. Protopic Counseling: Patient may experience a mild burning sensation during topical application. Protopic is not approved in children less than 2 years of age. There have been case reports of hematologic and skin malignancies in patients using topical calcineurin inhibitors although causality is questionable. Methotrexate Pregnancy And Lactation Text: This medication is Pregnancy Category X and is known to cause fetal harm. This medication is excreted in breast milk. Colchicine Pregnancy And Lactation Text: This medication is Pregnancy Category C and isn't considered safe during pregnancy. It is excreted in breast milk. Topical Sulfur Applications Pregnancy And Lactation Text: This medication is Pregnancy Category C and has an unknown safety profile during pregnancy. It is unknown if this topical medication is excreted in breast milk. Simponi Counseling:  I discussed with the patient the risks of golimumab including but not limited to myelosuppression, immunosuppression, autoimmune hepatitis, demyelinating diseases, lymphoma, and serious infections.  The patient understands that monitoring is required including a PPD at baseline and must alert us or the primary physician if symptoms of infection or other concerning signs are noted. Cimetidine Counseling:  I discussed with the patient the risks of Cimetidine including but not limited to gynecomastia, headache, diarrhea, nausea, drowsiness, arrhythmias, pancreatitis, skin rashes, psychosis, bone marrow suppression and kidney toxicity. Minocycline Pregnancy And Lactation Text: This medication is Pregnancy Category D and not consider safe during pregnancy. It is also excreted in breast milk. Cimetidine Pregnancy And Lactation Text: This medication is Pregnancy Category B and is considered safe during pregnancy. It is also excreted in breast milk and breast feeding isn't recommended. Tazorac Pregnancy And Lactation Text: This medication is not safe during pregnancy. It is unknown if this medication is excreted in breast milk. Cellcept Pregnancy And Lactation Text: This medication is Pregnancy Category D and isn't considered safe during pregnancy. It is unknown if this medication is excreted in breast milk. Xolair Pregnancy And Lactation Text: This medication is Pregnancy Category B and is considered safe during pregnancy. This medication is excreted in breast milk. Cephalexin Pregnancy And Lactation Text: This medication is Pregnancy Category B and considered safe during pregnancy.  It is also excreted in breast milk but can be used safely for shorter doses. Zyclara Counseling:  I discussed with the patient the risks of imiquimod including but not limited to erythema, scaling, itching, weeping, crusting, and pain.  Patient understands that the inflammatory response to imiquimod is variable from person to person and was educated regarded proper titration schedule.  If flu-like symptoms develop, patient knows to discontinue the medication and contact us. Taltz Counseling: I discussed with the patient the risks of ixekizumab including but not limited to immunosuppression, serious infections, worsening of inflammatory bowel disease and drug reactions.  The patient understands that monitoring is required including a PPD at baseline and must alert us or the primary physician if symptoms of infection or other concerning signs are noted. Erythromycin Pregnancy And Lactation Text: This medication is Pregnancy Category B and is considered safe during pregnancy. It is also excreted in breast milk. Oxybutynin Counseling:  I discussed with the patient the risks of oxybutynin including but not limited to skin rash, drowsiness, dry mouth, difficulty urinating, and blurred vision. Protopic Pregnancy And Lactation Text: This medication is Pregnancy Category C. It is unknown if this medication is excreted in breast milk when applied topically. Solaraze Pregnancy And Lactation Text: This medication is Pregnancy Category B and is considered safe. There is some data to suggest avoiding during the third trimester. It is unknown if this medication is excreted in breast milk. Cimzia Pregnancy And Lactation Text: This medication crosses the placenta but can be considered safe in certain situations. Cimzia may be excreted in breast milk. Doxepin Pregnancy And Lactation Text: This medication is Pregnancy Category C and it isn't known if it is safe during pregnancy. It is also excreted in breast milk and breast feeding isn't recommended. Glycopyrrolate Counseling:  I discussed with the patient the risks of glycopyrrolate including but not limited to skin rash, drowsiness, dry mouth, difficulty urinating, and blurred vision. Drysol Pregnancy And Lactation Text: This medication is considered safe during pregnancy and breast feeding. Siliq Counseling:  I discussed with the patient the risks of Siliq including but not limited to new or worsening depression, suicidal thoughts and behavior, immunosuppression, malignancy, posterior leukoencephalopathy syndrome, and serious infections.  The patient understands that monitoring is required including a PPD at baseline and must alert us or the primary physician if symptoms of infection or other concerning signs are noted. There is also a special program designed to monitor depression which is required with Siliq. Otezla Pregnancy And Lactation Text: This medication is Pregnancy Category C and it isn't known if it is safe during pregnancy. It is unknown if it is excreted in breast milk. Isotretinoin Pregnancy And Lactation Text: This medication is Pregnancy Category X and is considered extremely dangerous during pregnancy. It is unknown if it is excreted in breast milk. Hydroxychloroquine Counseling:  I discussed with the patient that a baseline ophthalmologic exam is needed at the start of therapy and every year thereafter while on therapy. A CBC may also be warranted for monitoring.  The side effects of this medication were discussed with the patient, including but not limited to agranulocytosis, aplastic anemia, seizures, rashes, retinopathy, and liver toxicity. Patient instructed to call the office should any adverse effect occur.  The patient verbalized understanding of the proper use and possible adverse effects of Plaquenil.  All the patient's questions and concerns were addressed. Oxybutynin Pregnancy And Lactation Text: This medication is Pregnancy Category B and is considered safe during pregnancy. It is unknown if it is excreted in breast milk. Picato Counseling:  I discussed with the patient the risks of Picato including but not limited to erythema, scaling, itching, weeping, crusting, and pain. Xeljanz Counseling: I discussed with the patient the risks of Xeljanz therapy including increased risk of infection, liver issues, headache, diarrhea, or cold symptoms. Live vaccines should be avoided. They were instructed to call if they have any problems. Thalidomide Counseling: I discussed with the patient the risks of thalidomide including but not limited to birth defects, anxiety, weakness, chest pain, dizziness, cough and severe allergy. Erythromycin Counseling:  I discussed with the patient the risks of erythromycin including but not limited to GI upset, allergic reaction, drug rash, diarrhea, increase in liver enzymes, and yeast infections. High Dose Vitamin A Counseling: Side effects reviewed, pt to contact office should one occur. Carac Pregnancy And Lactation Text: This medication is Pregnancy Category X and contraindicated in pregnancy and in women who may become pregnant. It is unknown if this medication is excreted in breast milk. Clindamycin Pregnancy And Lactation Text: This medication can be used in pregnancy if certain situations. Clindamycin is also present in breast milk. Prednisone Counseling:  I discussed with the patient the risks of prolonged use of prednisone including but not limited to weight gain, insomnia, osteoporosis, mood changes, diabetes, susceptibility to infection, glaucoma and high blood pressure.  In cases where prednisone use is prolonged, patients should be monitored with blood pressure checks, serum glucose levels and an eye exam.  Additionally, the patient may need to be placed on GI prophylaxis, PCP prophylaxis, and calcium and vitamin D supplementation and/or a bisphosphonate.  The patient verbalized understanding of the proper use and the possible adverse effects of prednisone.  All of the patient's questions and concerns were addressed. Azathioprine Counseling:  I discussed with the patient the risks of azathioprine including but not limited to myelosuppression, immunosuppression, hepatotoxicity, lymphoma, and infections.  The patient understands that monitoring is required including baseline LFTs, Creatinine, possible TPMP genotyping and weekly CBCs for the first month and then every 2 weeks thereafter.  The patient verbalized understanding of the proper use and possible adverse effects of azathioprine.  All of the patient's questions and concerns were addressed. Cellcept Counseling:  I discussed with the patient the risks of mycophenolate mofetil including but not limited to infection/immunosuppression, GI upset, hypokalemia, hypercholesterolemia, bone marrow suppression, lymphoproliferative disorders, malignancy, GI ulceration/bleed/perforation, colitis, interstitial lung disease, kidney failure, progressive multifocal leukoencephalopathy, and birth defects.  The patient understands that monitoring is required including a baseline creatinine and regular CBC testing. In addition, patient must alert us immediately if symptoms of infection or other concerning signs are noted. Itraconazole Counseling:  I discussed with the patient the risks of itraconazole including but not limited to liver damage, nausea/vomiting, neuropathy, and severe allergy.  The patient understands that this medication is best absorbed when taken with acidic beverages such as non-diet cola or ginger ale.  The patient understands that monitoring is required including baseline LFTs and repeat LFTs at intervals.  The patient understands that they are to contact us or the primary physician if concerning signs are noted. Valtrex Counseling: I discussed with the patient the risks of valacyclovir including but not limited to kidney damage, nausea, vomiting and severe allergy.  The patient understands that if the infection seems to be worsening or is not improving, they are to call. Solaraze Counseling:  I discussed with the patient the risks of Solaraze including but not limited to erythema, scaling, itching, weeping, crusting, and pain. Cosentyx Pregnancy And Lactation Text: This medication is Pregnancy Category B and is considered safe during pregnancy. It is unknown if this medication is excreted in breast milk. Sski Pregnancy And Lactation Text: This medication is Pregnancy Category D and isn't considered safe during pregnancy. It is excreted in breast milk. Clofazimine Counseling:  I discussed with the patient the risks of clofazimine including but not limited to skin and eye pigmentation, liver damage, nausea/vomiting, gastrointestinal bleeding and allergy. Isotretinoin Counseling: Patient should get monthly blood tests, not donate blood, not drive at night if vision affected, not share medication, and not undergo elective surgery for 6 months after tx completed. Side effects reviewed, pt to contact office should one occur. Hydroxyzine Counseling: Patient advised that the medication is sedating and not to drive a car after taking this medication.  Patient informed of potential adverse effects including but not limited to dry mouth, urinary retention, and blurry vision.  The patient verbalized understanding of the proper use and possible adverse effects of hydroxyzine.  All of the patient's questions and concerns were addressed. Bexarotene Counseling:  I discussed with the patient the risks of bexarotene including but not limited to hair loss, dry lips/skin/eyes, liver abnormalities, hyperlipidemia, pancreatitis, depression/suicidal ideation, photosensitivity, drug rash/allergic reactions, hypothyroidism, anemia, leukopenia, infection, cataracts, and teratogenicity.  Patient understands that they will need regular blood tests to check lipid profile, liver function tests, white blood cell count, thyroid function tests and pregnancy test if applicable. Eucrisa Counseling: Patient may experience a mild burning sensation during topical application. Eucrisa is not approved in children less than 2 years of age. Doxycycline Pregnancy And Lactation Text: This medication is Pregnancy Category D and not consider safe during pregnancy. It is also excreted in breast milk but is considered safe for shorter treatment courses. Hydroquinone Counseling:  Patient advised that medication may result in skin irritation, lightening (hypopigmentation), dryness, and burning.  In the event of skin irritation, the patient was advised to reduce the amount of the drug applied or use it less frequently.  Rarely, spots that are treated with hydroquinone can become darker (pseudoochronosis).  Should this occur, patient instructed to stop medication and call the office. The patient verbalized understanding of the proper use and possible adverse effects of hydroquinone.  All of the patient's questions and concerns were addressed. Xelbushraz Pregnancy And Lactation Text: This medication is Pregnancy Category D and is not considered safe during pregnancy.  The risk during breast feeding is also uncertain. Dupixent Pregnancy And Lactation Text: This medication likely crosses the placenta but the risk for the fetus is uncertain. This medication is excreted in breast milk. Carac Counseling:  I discussed with the patient the risks of Carac including but not limited to erythema, scaling, itching, weeping, crusting, and pain. Dapsone Counseling: I discussed with the patient the risks of dapsone including but not limited to hemolytic anemia, agranulocytosis, rashes, methemoglobinemia, kidney failure, peripheral neuropathy, headaches, GI upset, and liver toxicity.  Patients who start dapsone require monitoring including baseline LFTs and weekly CBCs for the first month, then every month thereafter.  The patient verbalized understanding of the proper use and possible adverse effects of dapsone.  All of the patient's questions and concerns were addressed. Infliximab Counseling:  I discussed with the patient the risks of infliximab including but not limited to myelosuppression, immunosuppression, autoimmune hepatitis, demyelinating diseases, lymphoma, and serious infections.  The patient understands that monitoring is required including a PPD at baseline and must alert us or the primary physician if symptoms of infection or other concerning signs are noted. Hydroxyzine Pregnancy And Lactation Text: This medication is not safe during pregnancy and should not be taken. It is also excreted in breast milk and breast feeding isn't recommended. Cephalexin Counseling: I counseled the patient regarding use of cephalexin as an antibiotic for prophylactic and/or therapeutic purposes. Cephalexin (commonly prescribed under brand name Keflex) is a cephalosporin antibiotic which is active against numerous classes of bacteria, including most skin bacteria. Side effects may include nausea, diarrhea, gastrointestinal upset, rash, hives, yeast infections, and in rare cases, hepatitis, kidney disease, seizures, fever, confusion, neurologic symptoms, and others. Patients with severe allergies to penicillin medications are cautioned that there is about a 10% incidence of cross-reactivity with cephalosporins. When possible, patients with penicillin allergies should use alternatives to cephalosporins for antibiotic therapy. Albendazole Pregnancy And Lactation Text: This medication is Pregnancy Category C and it isn't known if it is safe during pregnancy. It is also excreted in breast milk. Tetracycline Counseling: Patient counseled regarding possible photosensitivity and increased risk for sunburn.  Patient instructed to avoid sunlight, if possible.  When exposed to sunlight, patients should wear protective clothing, sunglasses, and sunscreen.  The patient was instructed to call the office immediately if the following severe adverse effects occur:  hearing changes, easy bruising/bleeding, severe headache, or vision changes.  The patient verbalized understanding of the proper use and possible adverse effects of tetracycline.  All of the patient's questions and concerns were addressed. Patient understands to avoid pregnancy while on therapy due to potential birth defects. Erivedge Counseling- I discussed with the patient the risks of Erivedge including but not limited to nausea, vomiting, diarrhea, constipation, weight loss, changes in the sense of taste, decreased appetite, muscle spasms, and hair loss.  The patient verbalized understanding of the proper use and possible adverse effects of Erivedge.  All of the patient's questions and concerns were addressed. Griseofulvin Counseling:  I discussed with the patient the risks of griseofulvin including but not limited to photosensitivity, cytopenia, liver damage, nausea/vomiting and severe allergy.  The patient understands that this medication is best absorbed when taken with a fatty meal (e.g., ice cream or french fries). Fluconazole Counseling:  Patient counseled regarding adverse effects of fluconazole including but not limited to headache, diarrhea, nausea, upset stomach, liver function test abnormalities, taste disturbance, and stomach pain.  There is a rare possibility of liver failure that can occur when taking fluconazole.  The patient understands that monitoring of LFTs and kidney function test may be required, especially at baseline. The patient verbalized understanding of the proper use and possible adverse effects of fluconazole.  All of the patient's questions and concerns were addressed. Minoxidil Counseling: Minoxidil is a topical medication which can increase blood flow where it is applied. It is uncertain how this medication increases hair growth. Side effects are uncommon and include stinging and allergic reactions. Otezla Counseling: The side effects of Otezla were discussed with the patient, including but not limited to worsening or new depression, weight loss, diarrhea, nausea, upper respiratory tract infection, and headache. Patient instructed to call the office should any adverse effect occur.  The patient verbalized understanding of the proper use and possible adverse effects of Otezla.  All the patient's questions and concerns were addressed. Minocycline Counseling: Patient advised regarding possible photosensitivity and discoloration of the teeth, skin, lips, tongue and gums.  Patient instructed to avoid sunlight, if possible.  When exposed to sunlight, patients should wear protective clothing, sunglasses, and sunscreen.  The patient was instructed to call the office immediately if the following severe adverse effects occur:  hearing changes, easy bruising/bleeding, severe headache, or vision changes.  The patient verbalized understanding of the proper use and possible adverse effects of minocycline.  All of the patient's questions and concerns were addressed. Quinolones Counseling:  I discussed with the patient the risks of fluoroquinolones including but not limited to GI upset, allergic reaction, drug rash, diarrhea, dizziness, photosensitivity, yeast infections, liver function test abnormalities, tendonitis/tendon rupture. Metronidazole Pregnancy And Lactation Text: This medication is Pregnancy Category B and considered safe during pregnancy.  It is also excreted in breast milk. 5-Fu Counseling: 5-Fluorouracil Counseling:  I discussed with the patient the risks of 5-fluorouracil including but not limited to erythema, scaling, itching, weeping, crusting, and pain. SSKI Counseling:  I discussed with the patient the risks of SSKI including but not limited to thyroid abnormalities, metallic taste, GI upset, fever, headache, acne, arthralgias, paraesthesias, lymphadenopathy, easy bleeding, arrhythmias, and allergic reaction. Metronidazole Counseling:  I discussed with the patient the risks of metronidazole including but not limited to seizures, nausea/vomiting, a metallic taste in the mouth, nausea/vomiting and severe allergy. Cimzia Counseling:  I discussed with the patient the risks of Cimzia including but not limited to immunosuppression, allergic reactions and infections.  The patient understands that monitoring is required including a PPD at baseline and must alert us or the primary physician if symptoms of infection or other concerning signs are noted. Albendazole Counseling:  I discussed with the patient the risks of albendazole including but not limited to cytopenia, kidney damage, nausea/vomiting and severe allergy.  The patient understands that this medication is being used in an off-label manner. Acitretin Counseling:  I discussed with the patient the risks of acitretin including but not limited to hair loss, dry lips/skin/eyes, liver damage, hyperlipidemia, depression/suicidal ideation, photosensitivity.  Serious rare side effects can include but are not limited to pancreatitis, pseudotumor cerebri, bony changes, clot formation/stroke/heart attack.  Patient understands that alcohol is contraindicated since it can result in liver toxicity and significantly prolong the elimination of the drug by many years. Topical Clindamycin Counseling: Patient counseled that this medication may cause skin irritation or allergic reactions.  In the event of skin irritation, the patient was advised to reduce the amount of the drug applied or use it less frequently.   The patient verbalized understanding of the proper use and possible adverse effects of clindamycin.  All of the patient's questions and concerns were addressed. Terbinafine Counseling: Patient counseling regarding adverse effects of terbinafine including but not limited to headache, diarrhea, rash, upset stomach, liver function test abnormalities, itching, taste/smell disturbance, nausea, abdominal pain, and flatulence.  There is a rare possibility of liver failure that can occur when taking terbinafine.  The patient understands that a baseline LFT and kidney function test may be required. The patient verbalized understanding of the proper use and possible adverse effects of terbinafine.  All of the patient's questions and concerns were addressed. Rituxan Pregnancy And Lactation Text: This medication is Pregnancy Category C and it isn't know if it is safe during pregnancy. It is unknown if this medication is excreted in breast milk but similar antibodies are known to be excreted. Bactrim Pregnancy And Lactation Text: This medication is Pregnancy Category D and is known to cause fetal risk.  It is also excreted in breast milk. Acitretin Pregnancy And Lactation Text: This medication is Pregnancy Category X and should not be given to women who are pregnant or may become pregnant in the future. This medication is excreted in breast milk. Cosentyx Counseling:  I discussed with the patient the risks of Cosentyx including but not limited to worsening of Crohn's disease, immunosuppression, allergic reactions and infections.  The patient understands that monitoring is required including a PPD at baseline and must alert us or the primary physician if symptoms of infection or other concerning signs are noted. Rifampin Counseling: I discussed with the patient the risks of rifampin including but not limited to liver damage, kidney damage, red-orange body fluids, nausea/vomiting and severe allergy. Cyclophosphamide Counseling:  I discussed with the patient the risks of cyclophosphamide including but not limited to hair loss, hormonal abnormalities, decreased fertility, abdominal pain, diarrhea, nausea and vomiting, bone marrow suppression and infection. The patient understands that monitoring is required while taking this medication. Benzoyl Peroxide Pregnancy And Lactation Text: This medication is Pregnancy Category C. It is unknown if benzoyl peroxide is excreted in breast milk. Doxepin Counseling:  Patient advised that the medication is sedating and not to drive a car after taking this medication. Patient informed of potential adverse effects including but not limited to dry mouth, urinary retention, and blurry vision.  The patient verbalized understanding of the proper use and possible adverse effects of doxepin.  All of the patient's questions and concerns were addressed. Arava Counseling:  Patient counseled regarding adverse effects of Arava including but not limited to nausea, vomiting, abnormalities in liver function tests. Patients may develop mouth sores, rash, diarrhea, and abnormalities in blood counts. The patient understands that monitoring is required including LFTs and blood counts.  There is a rare possibility of scarring of the liver and lung problems that can occur when taking methotrexate. Persistent nausea, loss of appetite, pale stools, dark urine, cough, and shortness of breath should be reported immediately. Patient advised to discontinue Arava treatment and consult with a physician prior to attempting conception. The patient will have to undergo a treatment to eliminate Arava from the body prior to conception. Humira Counseling:  I discussed with the patient the risks of adalimumab including but not limited to myelosuppression, immunosuppression, autoimmune hepatitis, demyelinating diseases, lymphoma, and serious infections.  The patient understands that monitoring is required including a PPD at baseline and must alert us or the primary physician if symptoms of infection or other concerning signs are noted. High Dose Vitamin A Pregnancy And Lactation Text: High dose vitamin A therapy is contraindicated during pregnancy and breast feeding. Azithromycin Pregnancy And Lactation Text: This medication is considered safe during pregnancy and is also secreted in breast milk. Rituxan Counseling:  I discussed with the patient the risks of Rituxan infusions. Side effects can include infusion reactions, severe drug rashes including mucocutaneous reactions, reactivation of latent hepatitis and other infections and rarely progressive multifocal leukoencephalopathy.  All of the patient's questions and concerns were addressed. Ketoconazole Pregnancy And Lactation Text: This medication is Pregnancy Category C and it isn't know if it is safe during pregnancy. It is also excreted in breast milk and breast feeding isn't recommended. Nsaids Counseling: NSAID Counseling: I discussed with the patient that NSAIDs should be taken with food. Prolonged use of NSAIDs can result in the development of stomach ulcers.  Patient advised to stop taking NSAIDs if abdominal pain occurs.  The patient verbalized understanding of the proper use and possible adverse effects of NSAIDs.  All of the patient's questions and concerns were addressed. Spironolactone Counseling: Patient advised regarding risks of diarrhea, abdominal pain, hyperkalemia, birth defects (for female patients), liver toxicity and renal toxicity. The patient may need blood work to monitor liver and kidney function and potassium levels while on therapy. The patient verbalized understanding of the proper use and possible adverse effects of spironolactone.  All of the patient's questions and concerns were addressed. Ilumya Counseling: I discussed with the patient the risks of tildrakizumab including but not limited to immunosuppression, malignancy, posterior leukoencephalopathy syndrome, and serious infections.  The patient understands that monitoring is required including a PPD at baseline and must alert us or the primary physician if symptoms of infection or other concerning signs are noted. Dupixent Counseling: I discussed with the patient the risks of dupilumab including but not limited to eye infection and irritation, cold sores, injection site reactions, worsening of asthma, allergic reactions and increased risk of parasitic infection.  Live vaccines should be avoided while taking dupilumab. Dupilumab will also interact with certain medications such as warfarin and cyclosporine. The patient understands that monitoring is required and they must alert us or the primary physician if symptoms of infection or other concerning signs are noted. Cyclosporine Counseling:  I discussed with the patient the risks of cyclosporine including but not limited to hypertension, gingival hyperplasia,myelosuppression, immunosuppression, liver damage, kidney damage, neurotoxicity, lymphoma, and serious infections. The patient understands that monitoring is required including baseline blood pressure, CBC, CMP, lipid panel and uric acid, and then 1-2 times monthly CMP and blood pressure. Enbrel Counseling:  I discussed with the patient the risks of etanercept including but not limited to myelosuppression, immunosuppression, autoimmune hepatitis, demyelinating diseases, lymphoma, and infections.  The patient understands that monitoring is required including a PPD at baseline and must alert us or the primary physician if symptoms of infection or other concerning signs are noted. Imiquimod Counseling:  I discussed with the patient the risks of imiquimod including but not limited to erythema, scaling, itching, weeping, crusting, and pain.  Patient understands that the inflammatory response to imiquimod is variable from person to person and was educated regarded proper titration schedule.  If flu-like symptoms develop, patient knows to discontinue the medication and contact us. Clindamycin Counseling: I counseled the patient regarding use of clindamycin as an antibiotic for prophylactic and/or therapeutic purposes. Clindamycin is active against numerous classes of bacteria, including skin bacteria. Side effects may include nausea, diarrhea, gastrointestinal upset, rash, hives, yeast infections, and in rare cases, colitis. Itraconazole Pregnancy And Lactation Text: This medication is Pregnancy Category C and it isn't know if it is safe during pregnancy. It is also excreted in breast milk. Elidel Counseling: Patient may experience a mild burning sensation during topical application. Elidel is not approved in children less than 2 years of age. There have been case reports of hematologic and skin malignancies in patients using topical calcineurin inhibitors although causality is questionable. Benzoyl Peroxide Counseling: Patient counseled that medicine may cause skin irritation and bleach clothing.  In the event of skin irritation, the patient was advised to reduce the amount of the drug applied or use it less frequently.   The patient verbalized understanding of the proper use and possible adverse effects of benzoyl peroxide.  All of the patient's questions and concerns were addressed. Glycopyrrolate Pregnancy And Lactation Text: This medication is Pregnancy Category B and is considered safe during pregnancy. It is unknown if it is excreted breast milk. Cyclophosphamide Pregnancy And Lactation Text: This medication is Pregnancy Category D and it isn't considered safe during pregnancy. This medication is excreted in breast milk. Azithromycin Counseling:  I discussed with the patient the risks of azithromycin including but not limited to GI upset, allergic reaction, drug rash, diarrhea, and yeast infections. Detail Level: Detailed Bexarotene Pregnancy And Lactation Text: This medication is Pregnancy Category X and should not be given to women who are pregnant or may become pregnant. This medication should not be used if you are breast feeding. Hydroxychloroquine Pregnancy And Lactation Text: This medication has been shown to cause fetal harm but it isn't assigned a Pregnancy Risk Category. There are small amounts excreted in breast milk. Birth Control Pills Pregnancy And Lactation Text: This medication should be avoided if pregnant and for the first 30 days post-partum. Tremfya Counseling: I discussed with the patient the risks of guselkumab including but not limited to immunosuppression, serious infections, worsening of inflammatory bowel disease and drug reactions.  The patient understands that monitoring is required including a PPD at baseline and must alert us or the primary physician if symptoms of infection or other concerning signs are noted. Colchicine Counseling:  Patient counseled regarding adverse effects including but not limited to stomach upset (nausea, vomiting, stomach pain, or diarrhea).  Patient instructed to limit alcohol consumption while taking this medication.  Colchicine may reduce blood counts especially with prolonged use.  The patient understands that monitoring of kidney function and blood counts may be required, especially at baseline. The patient verbalized understanding of the proper use and possible adverse effects of colchicine.  All of the patient's questions and concerns were addressed. Griseofulvin Pregnancy And Lactation Text: This medication is Pregnancy Category X and is known to cause serious birth defects. It is unknown if this medication is excreted in breast milk but breast feeding should be avoided.

## 2024-04-10 ENCOUNTER — OFFICE VISIT (OUTPATIENT)
Dept: FAMILY MEDICINE | Facility: CLINIC | Age: 79
End: 2024-04-10
Payer: MEDICARE

## 2024-04-10 VITALS
RESPIRATION RATE: 18 BRPM | TEMPERATURE: 98 F | OXYGEN SATURATION: 98 % | HEIGHT: 62 IN | BODY MASS INDEX: 24.69 KG/M2 | SYSTOLIC BLOOD PRESSURE: 128 MMHG | DIASTOLIC BLOOD PRESSURE: 80 MMHG | HEART RATE: 69 BPM

## 2024-04-10 DIAGNOSIS — N63.31 MASS OF AXILLARY TAIL OF RIGHT BREAST: ICD-10-CM

## 2024-04-10 DIAGNOSIS — R22.2 MASS OF CHEST WALL: Primary | ICD-10-CM

## 2024-04-10 PROCEDURE — 99213 OFFICE O/P EST LOW 20 MIN: CPT | Mod: ,,, | Performed by: FAMILY MEDICINE

## 2024-04-22 ENCOUNTER — HOSPITAL ENCOUNTER (OUTPATIENT)
Dept: RADIOLOGY | Facility: HOSPITAL | Age: 79
Discharge: HOME | End: 2024-04-22
Attending: FAMILY MEDICINE
Payer: MEDICARE

## 2024-04-22 VITALS — BODY MASS INDEX: 25.21 KG/M2 | HEIGHT: 62 IN | WEIGHT: 137 LBS

## 2024-04-22 DIAGNOSIS — N63.31 MASS OF AXILLARY TAIL OF RIGHT BREAST: ICD-10-CM

## 2024-04-22 PROCEDURE — 77065 DX MAMMO INCL CAD UNI: CPT | Mod: TC,RT

## 2024-04-22 PROCEDURE — 76642 ULTRASOUND BREAST LIMITED: CPT | Mod: TC,RT

## 2024-04-22 PROCEDURE — 77061 BREAST TOMOSYNTHESIS UNI: CPT | Mod: TC,RT

## 2024-04-25 ENCOUNTER — HOSPITAL ENCOUNTER (OUTPATIENT)
Dept: RADIOLOGY | Facility: HOSPITAL | Age: 79
Discharge: HOME | End: 2024-04-25
Attending: FAMILY MEDICINE
Payer: MEDICARE

## 2024-04-25 DIAGNOSIS — R22.2 MASS OF CHEST WALL: ICD-10-CM

## 2024-04-25 PROCEDURE — 76604 US EXAM CHEST: CPT | Mod: TC

## 2024-04-25 PROCEDURE — 76604 US EXAM CHEST: CPT | Mod: 26,,, | Performed by: RADIOLOGY

## 2024-04-26 ENCOUNTER — TELEPHONE (OUTPATIENT)
Dept: FAMILY MEDICINE | Facility: CLINIC | Age: 79
End: 2024-04-26
Payer: MEDICARE

## 2024-04-26 NOTE — TELEPHONE ENCOUNTER
----- Message from Raghavendra Lui MD sent at 4/25/2024 12:30 PM CDT -----  Neg, US, if the area persist, recommend CT chest, let me know

## 2024-04-26 NOTE — TELEPHONE ENCOUNTER
----- Message from Raghavendra Lui MD sent at 4/22/2024  4:53 PM CDT -----  Patient has what appears to be a benign lymph node please schedule follow-up ultrasound of the breasts and axilla in 3 months.

## 2024-04-26 NOTE — TELEPHONE ENCOUNTER
Patient notified that chest US was neg, however, she does request to follow through with CT. Will notify Dr Lui.

## 2024-04-30 ENCOUNTER — TELEPHONE (OUTPATIENT)
Dept: FAMILY MEDICINE | Facility: CLINIC | Age: 79
End: 2024-04-30
Payer: MEDICARE

## 2024-04-30 DIAGNOSIS — R22.2 MASS OF CHEST WALL: Primary | ICD-10-CM

## 2024-04-30 NOTE — TELEPHONE ENCOUNTER
Pt notified that kidney function was okay and that her CT is scheduled for 5/13/24 at 12:30pm, she voiced understanding.

## 2024-04-30 NOTE — TELEPHONE ENCOUNTER
----- Message from Raghavendra Lui MD sent at 4/30/2024  7:46 AM CDT -----  Renal function is okay CT

## 2024-05-01 ENCOUNTER — OFFICE VISIT (OUTPATIENT)
Dept: GASTROENTEROLOGY | Facility: CLINIC | Age: 79
End: 2024-05-01
Payer: MEDICARE

## 2024-05-01 VITALS
WEIGHT: 139.81 LBS | HEART RATE: 79 BPM | HEIGHT: 62 IN | SYSTOLIC BLOOD PRESSURE: 142 MMHG | BODY MASS INDEX: 25.73 KG/M2 | DIASTOLIC BLOOD PRESSURE: 77 MMHG | RESPIRATION RATE: 18 BRPM

## 2024-05-01 DIAGNOSIS — Z86.010 HISTORY OF COLON POLYPS: Primary | ICD-10-CM

## 2024-05-01 PROCEDURE — 99214 OFFICE O/P EST MOD 30 MIN: CPT | Mod: S$PBB,,, | Performed by: INTERNAL MEDICINE

## 2024-05-01 PROCEDURE — 99214 OFFICE O/P EST MOD 30 MIN: CPT | Mod: PBBFAC | Performed by: INTERNAL MEDICINE

## 2024-05-01 NOTE — PROGRESS NOTES
"  CC: diarrhea    HPI 79 y.o. female with alternating bowel habits, mainly irritable bowel syndrome with mixed features who presents for follow up. Has seen Nadege Cox in the past for evaluation. Had cholecystectomy on 05/26/2023. CT scan in past with iliac and periaortic subcentimeter lymphadenopathy. Patient has a strong family history of lymphoma and has been seeing to Dr. Cornejo for further evaluation of lymphadenopathy.     For bowel symptoms, she has been on metamucil and miralax. Symptoms were concerning for constipation and overflow diarrhea. Stool consistency has improved. Notes formed bowel movements then multiple loose stools after a few days.     Medical records reviewed. Additional history supplemented by nursing.     Past Medical History:   Diagnosis Date    Abdominal hernia     Basal cell carcinoma     Cardiac murmur     GERD (gastroesophageal reflux disease)     Hyperlipidemia     Hypertension      Review of Systems  General ROS: negative for chills, fever or weight loss  Cardiovascular ROS: no chest pain or dyspnea on exertion  Gastrointestinal ROS: no abdominal pain, change in bowel habits, or black/ bloody stools    Physical Examination  BP (!) 142/77   Pulse 79   Resp 18   Ht 5' 2" (1.575 m)   Wt 63.4 kg (139 lb 12.8 oz)   BMI 25.57 kg/m²   General appearance: alert, cooperative, no distress  HENT: Normocephalic, atraumatic, neck symmetrical  Eyes: conjunctivae clear  Lungs: No labored breathing  Skin: No jaundice  Neurologic: Alert and oriented X 3    Labs:  Lab Results   Component Value Date    WBC 4.46 (L) 11/30/2023    HGB 13.5 11/30/2023    HCT 41.7 11/30/2023    MCV 89.7 11/30/2023     11/30/2023     CMP  Sodium   Date Value Ref Range Status   04/29/2024 139 136 - 145 mmol/L Final     Potassium   Date Value Ref Range Status   04/29/2024 3.8 3.5 - 5.1 mmol/L Final     Chloride   Date Value Ref Range Status   04/29/2024 100 98 - 107 mmol/L Final     CO2   Date Value Ref Range " Status   04/29/2024 32 21 - 32 mmol/L Final     Glucose   Date Value Ref Range Status   04/29/2024 92 74 - 106 mg/dL Final     BUN   Date Value Ref Range Status   04/29/2024 16 7 - 18 mg/dL Final     Creatinine   Date Value Ref Range Status   04/29/2024 0.82 0.55 - 1.02 mg/dL Final     Calcium   Date Value Ref Range Status   04/29/2024 9.1 8.5 - 10.1 mg/dL Final     Total Protein   Date Value Ref Range Status   11/30/2023 6.6 6.4 - 8.2 g/dL Final     Albumin   Date Value Ref Range Status   11/30/2023 3.9 3.5 - 5.0 g/dL Final     Bilirubin, Total   Date Value Ref Range Status   11/30/2023 0.6 >0.0 - 1.2 mg/dL Final     Alk Phos   Date Value Ref Range Status   11/30/2023 78 55 - 142 U/L Final     AST   Date Value Ref Range Status   11/30/2023 20 15 - 37 U/L Final     ALT   Date Value Ref Range Status   11/30/2023 26 13 - 56 U/L Final     Anion Gap   Date Value Ref Range Status   04/29/2024 11 7 - 16 mmol/L Final     eGFR   Date Value Ref Range Status   05/24/2022 74 >=60 mL/min/1.73m² Final     Assessment:   Alternating bowel habits  Irritable bowel syndrome with mixed constipation and diarrhea  History of colon polyps    Plan:  -Trial of dietary modification  -Given low FODMAP diet for assitance  -Continue miralax and fiber, if no improvement with above dietary modification and OTC medications then will again discuss  -Colonoscopy due for history of colon polyps      30 minutes of total time spent on the encounter, which includes face to face time and non-face to face time preparing to see the patient (eg, review of tests), obtaining and/or reviewing separately obtained history, documenting clinical information in the electronic or other health record, Independently interpreting results (not separately reported) and communicating results to the patient/family/caregiver, or care coordination (not separately reported).        Eugenio Doherty MD  [facility]

## 2024-05-13 ENCOUNTER — TELEPHONE (OUTPATIENT)
Dept: FAMILY MEDICINE | Facility: CLINIC | Age: 79
End: 2024-05-13
Payer: MEDICARE

## 2024-05-13 ENCOUNTER — HOSPITAL ENCOUNTER (OUTPATIENT)
Dept: RADIOLOGY | Facility: HOSPITAL | Age: 79
Discharge: HOME | End: 2024-05-13
Attending: FAMILY MEDICINE
Payer: MEDICARE

## 2024-05-13 DIAGNOSIS — R22.2 MASS OF CHEST WALL: ICD-10-CM

## 2024-05-13 PROCEDURE — 71270 CT THORAX DX C-/C+: CPT | Mod: 26,,, | Performed by: STUDENT IN AN ORGANIZED HEALTH CARE EDUCATION/TRAINING PROGRAM

## 2024-05-13 PROCEDURE — 25500020 PHARM REV CODE 255: Performed by: FAMILY MEDICINE

## 2024-05-13 PROCEDURE — 71270 CT THORAX DX C-/C+: CPT | Mod: TC

## 2024-05-13 RX ORDER — POLYETHYLENE GLYCOL 3350, SODIUM SULFATE ANHYDROUS, SODIUM BICARBONATE, SODIUM CHLORIDE, POTASSIUM CHLORIDE 236; 22.74; 6.74; 5.86; 2.97 G/4L; G/4L; G/4L; G/4L; G/4L
4 POWDER, FOR SOLUTION ORAL ONCE
Qty: 4000 ML | Refills: 0 | Status: SHIPPED | OUTPATIENT
Start: 2024-05-13 | End: 2024-05-13

## 2024-05-13 RX ADMIN — IOPAMIDOL 100 ML: 755 INJECTION, SOLUTION INTRAVENOUS at 10:05

## 2024-05-13 NOTE — TELEPHONE ENCOUNTER
----- Message from Raghavendra Lui MD sent at 5/13/2024 12:59 PM CDT -----  Patient has a benign fatty tumor.  If this is bothering her we can refer her to General surgery for an evaluation

## 2024-05-13 NOTE — TELEPHONE ENCOUNTER
Pt called clinic back. Notified pt of results showing a benign fatty tumor. Provider stated if this is bothering her we can refer her to General surgery for an evaluation. Pt verbalized understanding and turned general surgery referral for now.

## 2024-05-29 ENCOUNTER — OFFICE VISIT (OUTPATIENT)
Dept: FAMILY MEDICINE | Facility: CLINIC | Age: 79
End: 2024-05-29
Payer: MEDICARE

## 2024-05-29 VITALS
OXYGEN SATURATION: 99 % | HEIGHT: 62 IN | DIASTOLIC BLOOD PRESSURE: 76 MMHG | SYSTOLIC BLOOD PRESSURE: 116 MMHG | RESPIRATION RATE: 16 BRPM | BODY MASS INDEX: 25.36 KG/M2 | WEIGHT: 137.81 LBS | TEMPERATURE: 98 F | HEART RATE: 62 BPM

## 2024-05-29 DIAGNOSIS — R35.0 URINARY FREQUENCY: ICD-10-CM

## 2024-05-29 DIAGNOSIS — R10.2 PELVIC PAIN: Primary | ICD-10-CM

## 2024-05-29 PROCEDURE — 99212 OFFICE O/P EST SF 10 MIN: CPT | Mod: ,,, | Performed by: FAMILY MEDICINE

## 2024-05-29 PROCEDURE — 81003 URINALYSIS AUTO W/O SCOPE: CPT | Mod: RHCUB | Performed by: FAMILY MEDICINE

## 2024-05-29 NOTE — PROGRESS NOTES
Jennifer Hale is a 79 y.o. female seen today for follow-up on her CT scan of the chest.  The CC scan did note a lipoma in the area in question.  Patient defers a surgical evaluation at this time.  She also had some scarring in the right lobe.  She does report a history of pneumonia denies any chest pain or shortness a breath.  Patient would appreciate a gyn evaluation for possible prolapsed bladder and increased symptoms of urinary frequency.  Today she was unable to avoid and will come back tomorrow.  Patient did have a nodule on her ultrasound of the breast and a follow-up is scheduled for July.    Past Medical History:   Diagnosis Date    Abdominal hernia     Basal cell carcinoma     Cardiac murmur     GERD (gastroesophageal reflux disease)     Hyperlipidemia     Hypertension      Family History   Problem Relation Name Age of Onset    Breast cancer Mother      Lymphoma Mother      Hypertension Mother      Lymphoma Father      Breast cancer Sister      Lymphoma Sister      Colon cancer Maternal Grandfather      Lymphoma Brother      Breast cancer Maternal Cousin      Breast cancer Maternal Cousin      Breast cancer Maternal Cousin       Current Outpatient Medications on File Prior to Visit   Medication Sig Dispense Refill    ascorbic acid, vitamin C, (VITAMIN C) 500 MG tablet Vitamin C 500 MG Oral Tablet QTY: 0 tablet Days: 0 Refills: 0  Written: 05/11/22 Patient Instructions:      cholecalciferol, vitamin D3, (VITAMIN D3) 50 mcg (2,000 unit) Cap capsule Vitamin D3 50 MCG (2000 UT) Oral Capsule QTY: 0 capsule Days: 0 Refills: 0  Written: 05/11/22 Patient Instructions:      fluticasone propionate (FLONASE) 50 mcg/actuation nasal spray 1 spray (50 mcg total) by Each Nostril route once daily. 48 g 3    multivitamin capsule Take 1 capsule by mouth once daily.      omega 3-dha-epa-fish oil (FISH OIL) 60- mg Cap 1 capsule.      triamterene-hydrochlorothiazide 37.5-25 mg (MAXZIDE-25) 37.5-25 mg per tablet TAKE 1  TABLET BY MOUTH EVERY DAY 90 tablet 1    amoxicillin-clavulanate 875-125mg (AUGMENTIN) 875-125 mg per tablet Take 1 tablet by mouth 2 (two) times daily. (Patient not taking: Reported on 4/10/2024) 20 tablet 0    pantoprazole (PROTONIX) 40 MG tablet Take 40 mg by mouth once daily. (Patient not taking: Reported on 4/10/2024)      [DISCONTINUED] tolterodine (DETROL LA) 4 MG 24 hr capsule Take 1 capsule (4 mg total) by mouth once daily. (Patient not taking: Reported on 1/12/2022) 30 capsule 2     No current facility-administered medications on file prior to visit.     Immunization History   Administered Date(s) Administered    COVID-19 MRNA, LN-S PF (MODERNA HALF 0.25 ML DOSE) 11/05/2021, 07/01/2022    COVID-19, MRNA, LN-S, PF (MODERNA FULL 0.5 ML DOSE) 03/01/2021    COVID-19, mRNA, LNP-S, bivalent booster, PF (Moderna Omicron)12 + YEARS 12/16/2022    Influenza 10/01/2021       Review of Systems   Constitutional:  Negative for fever, malaise/fatigue and weight loss.   Respiratory:  Negative for shortness of breath.    Cardiovascular:  Negative for chest pain and palpitations.   Gastrointestinal:  Negative for nausea and vomiting.   Genitourinary:  Positive for frequency.   Psychiatric/Behavioral:  Negative for depression.         Vitals:    05/29/24 1000   BP: 116/76   Pulse: 62   Resp: 16   Temp: 98.2 °F (36.8 °C)       Physical Exam  Vitals reviewed.   Eyes:      Conjunctiva/sclera: Conjunctivae normal.   Abdominal:      General: Abdomen is flat.   Neurological:      Mental Status: She is alert.   Psychiatric:         Mood and Affect: Mood normal.         Behavior: Behavior normal.         Thought Content: Thought content normal.         Judgment: Judgment normal.          Assessment and Plan  1. Pelvic pain  -     Ambulatory referral/consult to Obstetrics / Gynecology; Future; Expected date: 06/05/2024    2. Urinary frequency  -     POCT URINALYSIS W/O SCOPE  -     Ambulatory referral/consult to Obstetrics /  Gynecology; Future; Expected date: 06/05/2024             Return to clinic at the end of July after her repeat ultrasound of the breasts.  Patient will be fasting for that visit.    Health Maintenance Topics with due status: Not Due       Topic Last Completion Date    DEXA Scan 09/07/2023    Lipid Panel 11/30/2023

## 2024-05-30 LAB
BILIRUB SERPL-MCNC: NORMAL MG/DL
BLOOD URINE, POC: NORMAL
COLOR, POC UA: YELLOW
GLUCOSE UR QL STRIP: NORMAL
KETONES UR QL STRIP: NORMAL
LEUKOCYTE ESTERASE URINE, POC: NORMAL
NITRITE, POC UA: NORMAL
PH, POC UA: 7
PROTEIN, POC: NORMAL
SPECIFIC GRAVITY, POC UA: 1.01
UROBILINOGEN, POC UA: 0.2

## 2024-06-11 ENCOUNTER — OFFICE VISIT (OUTPATIENT)
Dept: OBSTETRICS AND GYNECOLOGY | Facility: CLINIC | Age: 79
End: 2024-06-11
Payer: MEDICARE

## 2024-06-11 VITALS
WEIGHT: 140 LBS | DIASTOLIC BLOOD PRESSURE: 72 MMHG | HEART RATE: 62 BPM | SYSTOLIC BLOOD PRESSURE: 126 MMHG | HEIGHT: 62 IN | OXYGEN SATURATION: 99 % | BODY MASS INDEX: 25.76 KG/M2

## 2024-06-11 DIAGNOSIS — R10.2 PELVIC PAIN: ICD-10-CM

## 2024-06-11 DIAGNOSIS — R35.0 URINARY FREQUENCY: ICD-10-CM

## 2024-06-11 DIAGNOSIS — N81.6 CYSTOCELE WITH RECTOCELE: ICD-10-CM

## 2024-06-11 DIAGNOSIS — R35.0 FREQUENCY OF URINATION: Primary | ICD-10-CM

## 2024-06-11 DIAGNOSIS — N81.10 CYSTOCELE WITH RECTOCELE: ICD-10-CM

## 2024-06-11 DIAGNOSIS — N95.2 ATROPHIC VAGINITIS: ICD-10-CM

## 2024-06-11 PROCEDURE — 99215 OFFICE O/P EST HI 40 MIN: CPT | Mod: PBBFAC | Performed by: OBSTETRICS & GYNECOLOGY

## 2024-06-11 PROCEDURE — 99999 PR PBB SHADOW E&M-EST. PATIENT-LVL V: CPT | Mod: PBBFAC,,, | Performed by: OBSTETRICS & GYNECOLOGY

## 2024-06-11 PROCEDURE — 99214 OFFICE O/P EST MOD 30 MIN: CPT | Mod: S$PBB,,, | Performed by: OBSTETRICS & GYNECOLOGY

## 2024-06-11 RX ORDER — ESTRADIOL 4 UG/1
1 INSERT VAGINAL
Qty: 8 EACH | Refills: 11 | Status: SHIPPED | OUTPATIENT
Start: 2024-06-13

## 2024-06-11 RX ORDER — ESTRADIOL 4 UG/1
1 INSERT VAGINAL NIGHTLY
Qty: 14 EACH | Refills: 0 | Status: SHIPPED | OUTPATIENT
Start: 2024-06-11 | End: 2024-06-25

## 2024-06-11 NOTE — PROGRESS NOTES
"Gynecology History and Physical    Assessment/Plan:   Problem List Items Addressed This Visit          Renal/    Frequency of urination - Primary    Relevant Orders    Urine culture    Urinalysis, Reflex to Urine Culture     Other Visit Diagnoses       Urinary frequency        Relevant Orders    Ambulatory referral/consult to Physical/Occupational Therapy    Pelvic pain        Relevant Orders    Ambulatory referral/consult to Physical/Occupational Therapy    Atrophic vaginitis        Relevant Medications    estradioL (IMVEXXY STARTER PACK) 4 mcg InPk    estradioL (IMVEXXY MAINTENANCE PACK) 4 mcg Inst    Cystocele with rectocele        Relevant Orders    Ambulatory referral/consult to Physical/Occupational Therapy          Discussed her exam showed that she has a cystocele and rectocele.    Wants to try Pelvic Floor PT first and then recheck in 4 months and possible referral to UroGYN if no improvement.   UA and urine culture with primary care was negative.    CC:   Chief Complaint   Patient presents with    Urinary Frequency     C/o urinary frequency that started about a month ago; had some "heaviness" in the lower abdominal area. No c/o of incontinence.     HPI: Jennifer Hale is a 79 y.o. female who presents with complaints of vaginal and pelvic pressure that started about 1 month ago. She denies any incontinence. She feels like she can completely empty her bladder. Denies any GI symptoms. She did have a hysterectomy > 20 years ago for benign reasons.   She had a UA and urine culture with her primary care that was negative about a week ago.   She does c/o some urgency with these symptoms.         Review of Systems: The following ROS was otherwise negative, except as noted in the HPI:  constitutional, HEENT, respiratory, cardiovascular, gastrointestinal, genitourinary, skin, musculoskeletal, neurological, psych    Gynecologic History:   No history of abnormal pap smears  No history of of STIs  Menstrual history:   "     Obstetrical History:  OB History          4    Para   2    Term   2            AB   2    Living   2         SAB   2    IAB        Ectopic        Multiple        Live Births   2                 Past Medical History:   Past Medical History:   Diagnosis Date    Abdominal hernia     Basal cell carcinoma     Cardiac murmur     GERD (gastroesophageal reflux disease)     Hyperlipidemia     Hypertension        Medications:  Medication List with Changes/Refills   New Medications    ESTRADIOL (IMVEXXY MAINTENANCE PACK) 4 MCG INST    Place 1 suppository vaginally twice a week. At bedtime    ESTRADIOL (IMVEXXY STARTER PACK) 4 MCG INPK    Place 1 suppository vaginally every evening. Take first for 14 days   Current Medications    ASCORBIC ACID, VITAMIN C, (VITAMIN C) 500 MG TABLET    Vitamin C 500 MG Oral Tablet QTY: 0 tablet Days: 0 Refills: 0  Written: 22 Patient Instructions:    CHOLECALCIFEROL, VITAMIN D3, (VITAMIN D3) 50 MCG (2,000 UNIT) CAP CAPSULE    Vitamin D3 50 MCG (2000 UT) Oral Capsule QTY: 0 capsule Days: 0 Refills: 0  Written: 22 Patient Instructions:    FLUTICASONE PROPIONATE (FLONASE) 50 MCG/ACTUATION NASAL SPRAY    1 spray (50 mcg total) by Each Nostril route once daily.    MULTIVITAMIN CAPSULE    Take 1 capsule by mouth once daily.    OMEGA 3-DHA-EPA-FISH OIL (FISH OIL) 60- MG CAP    1 capsule.    TRIAMTERENE-HYDROCHLOROTHIAZIDE 37.5-25 MG (MAXZIDE-25) 37.5-25 MG PER TABLET    TAKE 1 TABLET BY MOUTH EVERY DAY   Discontinued Medications    AMOXICILLIN-CLAVULANATE 875-125MG (AUGMENTIN) 875-125 MG PER TABLET    Take 1 tablet by mouth 2 (two) times daily.    PANTOPRAZOLE (PROTONIX) 40 MG TABLET    Take 40 mg by mouth once daily.       Allergies:  Sulfa (sulfonamide antibiotics)    Surgical History:  Past Surgical History:   Procedure Laterality Date    basil cell carcinoma      left cheek    DILATION AND CURETTAGE OF UTERUS      HYSTERECTOMY      LAPAROSCOPIC CHOLECYSTECTOMY N/A  "05/26/2022    Procedure: CHOLECYSTECTOMY, LAPAROSCOPIC;  Surgeon: Gil Monique DO;  Location: [facility];  Service: General;  Laterality: N/A;    TEAR DUCT SURGERY      right eye    TOTAL ABDOMINAL HYSTERECTOMY W/ BILATERAL SALPINGOOPHORECTOMY  01/18/2000       Family History:  Family History   Problem Relation Name Age of Onset    Breast cancer Mother      Lymphoma Mother      Hypertension Mother      Lymphoma Father      Breast cancer Sister      Lymphoma Sister      Colon cancer Maternal Grandfather      Lymphoma Brother      Breast cancer Maternal Cousin      Breast cancer Maternal Cousin      Breast cancer Maternal Cousin         Social History:  Social History     Substance and Sexual Activity   Alcohol Use Yes    Comment: only occas     Social History     Substance and Sexual Activity   Drug Use Never     Social History     Tobacco Use   Smoking Status Never    Passive exposure: Past   Smokeless Tobacco Never       Physical Exam:  /72 (BP Location: Right arm, Patient Position: Sitting)   Pulse 62   Ht 5' 2" (1.575 m)   Wt 63.5 kg (140 lb)   SpO2 99%   BMI 25.61 kg/m²     General: Alert, well appearing, no acute distress  Head: Normocephalic, atraumatic  Lungs: Unlabored respirations. Clear to auscultation bilaterally.  Cardiovascular: Regular rate and rhythm.   Abdomen: Soft, nontender, nondistended   Pelvic:  Exam chaperoned by female assistant.   External: normal female genitalia, no masses or lesions   Vagina: pale, pink mucosa with rugae, minimal physiologic discharge. Stage 3 cystocele and stage 1-2 rectocele.  Cervix: surgically absent.  Uterus: surgically absent.  Adnexa: no masses or fullness, nontender  Rectovaginal: deferred  Extremities: No redness or tenderness  Skin: Well perfused, normal coloration and turgor, no lesions or rashes visualized  Neuro: Alert, oriented, normal speech, no focal deficits, moves extremities appropriately  Psych: Normal mood and behavior.    Labs:  No " visits with results within 1 Day(s) from this visit.   Latest known visit with results is:   Office Visit on 05/29/2024   Component Date Value    Color, UA 05/30/2024 Yellow     Spec Grav UA 05/30/2024 1.01     pH, UA 05/30/2024 7.0     WBC, UA 05/30/2024 NEG     Nitrite, UA 05/30/2024 NEG     Protein, POC 05/30/2024 NEG     Glucose, UA 05/30/2024 NEG     Ketones, UA 05/30/2024 NEG     Bilirubin, POC 05/30/2024 NEG     Urobilinogen, UA 05/30/2024 0.2     Blood, UA 05/30/2024 NEG        Tito Miguel MD

## 2024-06-24 ENCOUNTER — TELEPHONE (OUTPATIENT)
Dept: OBSTETRICS AND GYNECOLOGY | Facility: CLINIC | Age: 79
End: 2024-06-24
Payer: MEDICARE

## 2024-06-24 ENCOUNTER — OFFICE VISIT (OUTPATIENT)
Dept: FAMILY MEDICINE | Facility: CLINIC | Age: 79
End: 2024-06-24
Payer: MEDICARE

## 2024-06-24 VITALS
HEIGHT: 62 IN | SYSTOLIC BLOOD PRESSURE: 146 MMHG | DIASTOLIC BLOOD PRESSURE: 79 MMHG | HEART RATE: 66 BPM | OXYGEN SATURATION: 97 % | WEIGHT: 141 LBS | TEMPERATURE: 98 F | BODY MASS INDEX: 25.95 KG/M2 | RESPIRATION RATE: 20 BRPM

## 2024-06-24 DIAGNOSIS — T63.464A WASP STING, UNDETERMINED INTENT, INITIAL ENCOUNTER: Primary | ICD-10-CM

## 2024-06-24 PROCEDURE — 99213 OFFICE O/P EST LOW 20 MIN: CPT | Mod: ,,, | Performed by: NURSE PRACTITIONER

## 2024-06-24 RX ORDER — TRIAMCINOLONE ACETONIDE 0.25 MG/G
OINTMENT TOPICAL 2 TIMES DAILY PRN
Qty: 80 G | Refills: 0 | Status: SHIPPED | OUTPATIENT
Start: 2024-06-24

## 2024-06-24 NOTE — TELEPHONE ENCOUNTER
Attempted to call patient         ----- Message from Kaylyn Miguel sent at 6/24/2024  2:58 PM CDT -----  Who Called: Jennifer Hale    Caller is requesting assistance/information from provider's office      Preferred Method of Contact: Phone Call  Patient's Preferred Phone Number on File: [phone]   Best Call Back Number, if different: [phone]  Additional Information: checking status of external referral for physical therapy

## 2024-06-25 ENCOUNTER — TELEPHONE (OUTPATIENT)
Dept: OBSTETRICS AND GYNECOLOGY | Facility: CLINIC | Age: 79
End: 2024-06-25
Payer: MEDICARE

## 2024-06-25 NOTE — PROGRESS NOTES
"Subjective:       Patient ID: Jennifer Hale is a 79 y.o. female.    Chief Complaint: Insect Bite (Patient states that she was stung by a wasp (back of head))    Presents to clinic as above.  Thinks the "stinger may still be in my head." No SOB. Saw the wasp. Felt it sting her. Happened this morning.       Review of Systems   Constitutional: Negative.    Respiratory: Negative.     Cardiovascular: Negative.           Reviewed family, medical, surgical, and social history.    Objective:      BP (!) 146/79 (BP Location: Right arm, Patient Position: Sitting, BP Method: Medium (Automatic))   Pulse 66   Temp 98.1 °F (36.7 °C)   Resp 20   Ht 5' 2" (1.575 m)   Wt 64 kg (141 lb)   SpO2 97%   BMI 25.79 kg/m²   Physical Exam  Vitals and nursing note reviewed.   Constitutional:       General: She is not in acute distress.     Appearance: Normal appearance. She is normal weight. She is not ill-appearing, toxic-appearing or diaphoretic.   HENT:      Head: Normocephalic.      Mouth/Throat:      Mouth: Mucous membranes are moist.   Cardiovascular:      Rate and Rhythm: Normal rate and regular rhythm.      Heart sounds: Normal heart sounds.   Pulmonary:      Effort: Pulmonary effort is normal.      Breath sounds: Normal breath sounds.   Musculoskeletal:      Cervical back: Normal range of motion and neck supple.   Skin:     General: Skin is warm and dry.      Capillary Refill: Capillary refill takes less than 2 seconds.             Comments: Sting malou to back of head/scalp. The stinger was removed easily. Mild redness.    Neurological:      Mental Status: She is alert and oriented to person, place, and time.   Psychiatric:         Mood and Affect: Mood normal.         Behavior: Behavior normal.         Thought Content: Thought content normal.         Judgment: Judgment normal.            No visits with results within 1 Day(s) from this visit.   Latest known visit with results is:   Office Visit on 05/29/2024   Component Date " Value Ref Range Status    Color, UA 05/30/2024 Yellow   Final    Spec Grav UA 05/30/2024 1.01   Final    pH, UA 05/30/2024 7.0   Final    WBC, UA 05/30/2024 NEG   Final    Nitrite, UA 05/30/2024 NEG   Final    Protein, POC 05/30/2024 NEG   Final    Glucose, UA 05/30/2024 NEG   Final    Ketones, UA 05/30/2024 NEG   Final    Bilirubin, POC 05/30/2024 NEG   Final    Urobilinogen, UA 05/30/2024 0.2   Final    Blood, UA 05/30/2024 NEG   Final      Assessment:       1. Wasp sting, undetermined intent, initial encounter        Plan:       Wasp sting, undetermined intent, initial encounter  -     triamcinolone acetonide 0.025% (KENALOG) 0.025 % Oint; Apply topically 2 (two) times daily as needed (soreness itching to scalp wound from wasp sting).  Dispense: 80 g; Refill: 0    RTC PRN          Risks, benefits, and side effects were discussed with the patient. All questions were answered to the fullest satisfaction of the patient, and pt verbalized understanding and agreement to treatment plan. Pt was to call with any new or worsening symptoms, or present to the ER.

## 2024-07-09 DIAGNOSIS — Z71.89 COMPLEX CARE COORDINATION: ICD-10-CM

## 2024-07-29 ENCOUNTER — HOSPITAL ENCOUNTER (OUTPATIENT)
Dept: RADIOLOGY | Facility: HOSPITAL | Age: 79
Discharge: HOME | End: 2024-07-29
Attending: RADIOLOGY
Payer: MEDICARE

## 2024-07-29 DIAGNOSIS — R92.8 ABNORMAL MAMMOGRAM: ICD-10-CM

## 2024-07-29 PROCEDURE — 76642 ULTRASOUND BREAST LIMITED: CPT | Mod: TC,RT

## 2024-07-30 ENCOUNTER — OFFICE VISIT (OUTPATIENT)
Dept: FAMILY MEDICINE | Facility: CLINIC | Age: 79
End: 2024-07-30
Payer: MEDICARE

## 2024-07-30 VITALS
HEIGHT: 62 IN | HEART RATE: 58 BPM | RESPIRATION RATE: 18 BRPM | SYSTOLIC BLOOD PRESSURE: 134 MMHG | DIASTOLIC BLOOD PRESSURE: 74 MMHG | WEIGHT: 140 LBS | OXYGEN SATURATION: 96 % | BODY MASS INDEX: 25.76 KG/M2 | TEMPERATURE: 98 F

## 2024-07-30 DIAGNOSIS — I83.93 VARICOSE VEINS OF BOTH LOWER EXTREMITIES, UNSPECIFIED WHETHER COMPLICATED: ICD-10-CM

## 2024-07-30 DIAGNOSIS — Z11.59 SCREENING FOR VIRAL DISEASE: ICD-10-CM

## 2024-07-30 DIAGNOSIS — I10 ESSENTIAL HYPERTENSION: Primary | ICD-10-CM

## 2024-07-30 DIAGNOSIS — E78.5 HYPERLIPIDEMIA, UNSPECIFIED HYPERLIPIDEMIA TYPE: ICD-10-CM

## 2024-07-30 LAB
ALBUMIN SERPL BCP-MCNC: 4.1 G/DL (ref 3.5–5)
ALBUMIN/GLOB SERPL: 1.4 {RATIO}
ALP SERPL-CCNC: 90 U/L (ref 55–142)
ALT SERPL W P-5'-P-CCNC: 33 U/L (ref 13–56)
ANION GAP SERPL CALCULATED.3IONS-SCNC: 11 MMOL/L (ref 7–16)
AST SERPL W P-5'-P-CCNC: 27 U/L (ref 15–37)
BASOPHILS # BLD AUTO: 0.08 K/UL (ref 0–0.2)
BASOPHILS NFR BLD AUTO: 1.6 % (ref 0–1)
BILIRUB SERPL-MCNC: 0.5 MG/DL (ref ?–1.2)
BUN SERPL-MCNC: 17 MG/DL (ref 7–18)
BUN/CREAT SERPL: 21 (ref 6–20)
CALCIUM SERPL-MCNC: 9.8 MG/DL (ref 8.5–10.1)
CHLORIDE SERPL-SCNC: 103 MMOL/L (ref 98–107)
CHOLEST SERPL-MCNC: 240 MG/DL (ref 0–200)
CHOLEST/HDLC SERPL: 4.1 {RATIO}
CO2 SERPL-SCNC: 27 MMOL/L (ref 21–32)
CREAT SERPL-MCNC: 0.81 MG/DL (ref 0.55–1.02)
DIFFERENTIAL METHOD BLD: ABNORMAL
EGFR (NO RACE VARIABLE) (RUSH/TITUS): 74 ML/MIN/1.73M2
EOSINOPHIL # BLD AUTO: 0.07 K/UL (ref 0–0.5)
EOSINOPHIL NFR BLD AUTO: 1.4 % (ref 1–4)
ERYTHROCYTE [DISTWIDTH] IN BLOOD BY AUTOMATED COUNT: 13.3 % (ref 11.5–14.5)
GLOBULIN SER-MCNC: 3 G/DL (ref 2–4)
GLUCOSE SERPL-MCNC: 91 MG/DL (ref 74–106)
HCT VFR BLD AUTO: 45 % (ref 38–47)
HCV AB SER QL: NORMAL
HDLC SERPL-MCNC: 59 MG/DL (ref 40–60)
HGB BLD-MCNC: 13.9 G/DL (ref 12–16)
IMM GRANULOCYTES # BLD AUTO: 0.01 K/UL (ref 0–0.04)
IMM GRANULOCYTES NFR BLD: 0.2 % (ref 0–0.4)
LDLC SERPL CALC-MCNC: 138 MG/DL
LDLC/HDLC SERPL: 2.3 {RATIO}
LYMPHOCYTES # BLD AUTO: 1.56 K/UL (ref 1–4.8)
LYMPHOCYTES NFR BLD AUTO: 31.1 % (ref 27–41)
MCH RBC QN AUTO: 28.3 PG (ref 27–31)
MCHC RBC AUTO-ENTMCNC: 30.9 G/DL (ref 32–36)
MCV RBC AUTO: 91.5 FL (ref 80–96)
MONOCYTES # BLD AUTO: 0.38 K/UL (ref 0–0.8)
MONOCYTES NFR BLD AUTO: 7.6 % (ref 2–6)
MPC BLD CALC-MCNC: 10.7 FL (ref 9.4–12.4)
NEUTROPHILS # BLD AUTO: 2.91 K/UL (ref 1.8–7.7)
NEUTROPHILS NFR BLD AUTO: 58.1 % (ref 53–65)
NONHDLC SERPL-MCNC: 181 MG/DL
NRBC # BLD AUTO: 0 X10E3/UL
NRBC, AUTO (.00): 0 %
PLATELET # BLD AUTO: 246 K/UL (ref 150–400)
POTASSIUM SERPL-SCNC: 4.4 MMOL/L (ref 3.5–5.1)
PROT SERPL-MCNC: 7.1 G/DL (ref 6.4–8.2)
RBC # BLD AUTO: 4.92 M/UL (ref 4.2–5.4)
SODIUM SERPL-SCNC: 137 MMOL/L (ref 136–145)
TRIGL SERPL-MCNC: 217 MG/DL (ref 35–150)
VLDLC SERPL-MCNC: 43 MG/DL
WBC # BLD AUTO: 5.01 K/UL (ref 4.5–11)

## 2024-07-30 PROCEDURE — 86803 HEPATITIS C AB TEST: CPT | Mod: ,,, | Performed by: CLINICAL MEDICAL LABORATORY

## 2024-07-30 PROCEDURE — 80061 LIPID PANEL: CPT | Mod: ,,, | Performed by: CLINICAL MEDICAL LABORATORY

## 2024-07-30 PROCEDURE — 99214 OFFICE O/P EST MOD 30 MIN: CPT | Mod: ,,, | Performed by: FAMILY MEDICINE

## 2024-07-30 PROCEDURE — 80053 COMPREHEN METABOLIC PANEL: CPT | Mod: ,,, | Performed by: CLINICAL MEDICAL LABORATORY

## 2024-07-30 PROCEDURE — 85025 COMPLETE CBC W/AUTO DIFF WBC: CPT | Mod: ,,, | Performed by: CLINICAL MEDICAL LABORATORY

## 2024-07-30 RX ORDER — TRIAMTERENE/HYDROCHLOROTHIAZID 37.5-25 MG
1 TABLET ORAL DAILY
Qty: 90 TABLET | Refills: 1 | Status: SHIPPED | OUTPATIENT
Start: 2024-07-30

## 2024-07-30 NOTE — PROGRESS NOTES
Jennifer Hale is a 79 y.o. female seen today for follow-up on her hypertension and hyperlipidemia.  Her blood pressures remain well controlled and she has had no chest pain or shortness a breath.  She is complaining of increased swelling and varicose veins involving her right lower extremity we [facility] an [facility].  Patient will undergo a biopsy of an axillary lymph node on the 12th of August.    Past Medical History:   Diagnosis Date    Abdominal hernia     Basal cell carcinoma     Cardiac murmur     GERD (gastroesophageal reflux disease)     Hyperlipidemia     Hypertension      Family History   Problem Relation Name Age of Onset    Breast cancer Mother      Lymphoma Mother      Hypertension Mother      Lymphoma Father      Breast cancer Sister      Lymphoma Sister      Colon cancer Maternal Grandfather      Lymphoma Brother      Breast cancer Maternal Cousin      Breast cancer Maternal Cousin      Breast cancer Maternal Cousin       Current Outpatient Medications on File Prior to Visit   Medication Sig Dispense Refill    ascorbic acid, vitamin C, (VITAMIN C) 500 MG tablet Vitamin C 500 MG Oral Tablet QTY: 0 tablet Days: 0 Refills: 0  Written: 05/11/22 Patient Instructions:      cholecalciferol, vitamin D3, (VITAMIN D3) 50 mcg (2,000 unit) Cap capsule Vitamin D3 50 MCG (2000 UT) Oral Capsule QTY: 0 capsule Days: 0 Refills: 0  Written: 05/11/22 Patient Instructions:      estradioL (IMVEXXY MAINTENANCE PACK) 4 mcg Inst Place 1 suppository vaginally twice a week. At bedtime 8 each 11    fluticasone propionate (FLONASE) 50 mcg/actuation nasal spray 1 spray (50 mcg total) by Each Nostril route once daily. 48 g 3    multivitamin capsule Take 1 capsule by mouth once daily.      omega 3-dha-epa-fish oil (FISH OIL) 60- mg Cap 1 capsule.      triamcinolone acetonide 0.025% (KENALOG) 0.025 % Oint Apply topically 2 (two) times daily as needed (soreness itching to scalp wound from wasp sting). 80 g  0    [DISCONTINUED] triamterene-hydrochlorothiazide 37.5-25 mg (MAXZIDE-25) 37.5-25 mg per tablet TAKE 1 TABLET BY MOUTH EVERY DAY 90 tablet 1    [DISCONTINUED] tolterodine (DETROL LA) 4 MG 24 hr capsule Take 1 capsule (4 mg total) by mouth once daily. (Patient not taking: Reported on 1/12/2022) 30 capsule 2     No current facility-administered medications on file prior to visit.     Immunization History   Administered Date(s) Administered    COVID-19 MRNA, LN-S PF (MODERNA HALF 0.25 ML DOSE) 11/05/2021, 07/01/2022    COVID-19, MRNA, LN-S, PF (MODERNA FULL 0.5 ML DOSE) 03/01/2021    COVID-19, mRNA, LNP-S, bivalent booster, PF (Moderna Omicron)12 + YEARS 12/16/2022    Hepatitis A, Adult 09/28/2022, 03/28/2023    Influenza 10/01/2021, 10/10/2023       Review of Systems   Constitutional:  Negative for fever, malaise/fatigue and weight loss.   Respiratory:  Negative for shortness of breath.    Cardiovascular:  Positive for leg swelling. Negative for chest pain and palpitations.   Gastrointestinal:  Negative for nausea and vomiting.   Psychiatric/Behavioral:  Negative for depression.         Vitals:    07/30/24 0954   BP: 134/74   Pulse: (!) 58   Resp: 18   Temp: 98.1 °F (36.7 °C)       Physical Exam  Vitals reviewed.   Constitutional:       Appearance: Normal appearance.   HENT:      Head: Normocephalic.   Eyes:      Extraocular Movements: Extraocular movements intact.      Conjunctiva/sclera: Conjunctivae normal.      Pupils: Pupils are equal, round, and reactive to light.   Neck:      Thyroid: No thyroid mass or thyromegaly.   Cardiovascular:      Rate and Rhythm: Normal rate and regular rhythm.      Heart sounds: Normal heart sounds. No murmur heard.     No gallop.      Comments: Varicose veins noted bilaterally.  Pulmonary:      Effort: Pulmonary effort is normal. No respiratory distress.      Breath sounds: Normal breath sounds. No wheezing or rales.   Skin:     General: Skin is warm and dry.      Coloration: Skin  is not jaundiced or pale.   Neurological:      Mental Status: She is alert.   Psychiatric:         Mood and Affect: Mood normal.         Behavior: Behavior normal.         Thought Content: Thought content normal.         Judgment: Judgment normal.          Assessment and Plan  1. Essential hypertension  -     triamterene-hydrochlorothiazide 37.5-25 mg (MAXZIDE-25) 37.5-25 mg per tablet; Take 1 tablet by mouth once daily.  Dispense: 90 tablet; Refill: 1  -     CBC Auto Differential; Future; Expected date: 07/30/2024  -     Comprehensive Metabolic Panel; Future; Expected date: 07/30/2024    2. Screening for viral disease  -     Hepatitis C Antibody; Future; Expected date: 07/30/2024    3. Hyperlipidemia, unspecified hyperlipidemia type  -     Lipid Panel; Future; Expected date: 07/30/2024             Return to clinic in 1 month after her biopsy are as needed.    Health Maintenance Topics with due status: Not Due       Topic Last Completion Date    DEXA Scan 09/07/2023    Influenza Vaccine 10/10/2023    Lipid Panel 11/30/2023

## 2024-08-06 ENCOUNTER — PATIENT MESSAGE (OUTPATIENT)
Dept: FAMILY MEDICINE | Facility: CLINIC | Age: 79
End: 2024-08-06
Payer: MEDICARE

## 2024-08-06 DIAGNOSIS — M17.11 PRIMARY OSTEOARTHRITIS OF RIGHT KNEE: Primary | ICD-10-CM

## 2024-08-08 ENCOUNTER — HOSPITAL ENCOUNTER (OUTPATIENT)
Dept: RADIOLOGY | Facility: HOSPITAL | Age: 79
Discharge: HOME | End: 2024-08-08
Attending: NURSE PRACTITIONER
Payer: MEDICARE

## 2024-08-08 ENCOUNTER — OFFICE VISIT (OUTPATIENT)
Dept: ORTHOPEDICS | Facility: CLINIC | Age: 79
End: 2024-08-08
Payer: MEDICARE

## 2024-08-08 DIAGNOSIS — M25.561 RIGHT KNEE PAIN, UNSPECIFIED CHRONICITY: Primary | ICD-10-CM

## 2024-08-08 DIAGNOSIS — M17.11 PRIMARY OSTEOARTHRITIS OF RIGHT KNEE: ICD-10-CM

## 2024-08-08 PROCEDURE — 73564 X-RAY EXAM KNEE 4 OR MORE: CPT | Mod: 26,RT,, | Performed by: RADIOLOGY

## 2024-08-08 PROCEDURE — 99212 OFFICE O/P EST SF 10 MIN: CPT | Mod: PBBFAC,25 | Performed by: NURSE PRACTITIONER

## 2024-08-08 PROCEDURE — 73564 X-RAY EXAM KNEE 4 OR MORE: CPT | Mod: TC,RT

## 2024-08-08 PROCEDURE — 99999 PR PBB SHADOW E&M-EST. PATIENT-LVL II: CPT | Mod: PBBFAC,,, | Performed by: NURSE PRACTITIONER

## 2024-08-08 PROCEDURE — 99213 OFFICE O/P EST LOW 20 MIN: CPT | Mod: S$PBB,,, | Performed by: NURSE PRACTITIONER

## 2024-08-12 ENCOUNTER — HOSPITAL ENCOUNTER (OUTPATIENT)
Dept: RADIOLOGY | Facility: HOSPITAL | Age: 79
Discharge: HOME | End: 2024-08-12
Attending: RADIOLOGY
Payer: MEDICARE

## 2024-08-12 DIAGNOSIS — R93.89 ABNORMAL ULTRASOUND: ICD-10-CM

## 2024-08-12 PROCEDURE — 27200940 HC BIOPSY TRAY

## 2024-08-12 PROCEDURE — 88305 TISSUE EXAM BY PATHOLOGIST: CPT | Mod: 26,,, | Performed by: PATHOLOGY

## 2024-08-12 PROCEDURE — 27202663

## 2024-08-12 PROCEDURE — A4648 IMPLANTABLE TISSUE MARKER: HCPCS

## 2024-08-12 PROCEDURE — 38505 NEEDLE BIOPSY LYMPH NODES: CPT

## 2024-08-12 PROCEDURE — 88305 TISSUE EXAM BY PATHOLOGIST: CPT | Mod: TC,SUR | Performed by: RADIOLOGY

## 2024-08-14 ENCOUNTER — OFFICE VISIT (OUTPATIENT)
Dept: ORTHOPEDICS | Facility: CLINIC | Age: 79
End: 2024-08-14
Payer: MEDICARE

## 2024-08-14 DIAGNOSIS — M17.11 PRIMARY OSTEOARTHRITIS OF RIGHT KNEE: Primary | ICD-10-CM

## 2024-08-14 LAB
M LLPT RESULT: NORMAL
PATH REPORT.FINAL DX SPEC: NORMAL
PATH REPORT.MICROSCOPIC SPEC OTHER STN: NORMAL
RELEVANT DIAGNOSTIC TESTS/LABORATORY DATA NOTE: NORMAL

## 2024-08-14 PROCEDURE — 20610 DRAIN/INJ JOINT/BURSA W/O US: CPT | Mod: PBBFAC | Performed by: NURSE PRACTITIONER

## 2024-08-14 PROCEDURE — 99999PBSHW PR PBB SHADOW TECHNICAL ONLY FILED TO HB: Mod: PBBFAC,,,

## 2024-08-14 PROCEDURE — 99213 OFFICE O/P EST LOW 20 MIN: CPT | Mod: PBBFAC | Performed by: NURSE PRACTITIONER

## 2024-08-14 PROCEDURE — 99999 PR PBB SHADOW E&M-EST. PATIENT-LVL III: CPT | Mod: PBBFAC,,, | Performed by: NURSE PRACTITIONER

## 2024-08-14 RX ORDER — TRIAMCINOLONE ACETONIDE 40 MG/ML
40 INJECTION, SUSPENSION INTRA-ARTICULAR; INTRAMUSCULAR
Status: DISCONTINUED | OUTPATIENT
Start: 2024-08-14 | End: 2024-08-14 | Stop reason: HOSPADM

## 2024-08-14 RX ADMIN — TRIAMCINOLONE ACETONIDE 40 MG: 40 INJECTION, SUSPENSION INTRA-ARTICULAR; INTRAMUSCULAR at 09:08

## 2024-08-14 NOTE — PROGRESS NOTES
79 y.o. Female returns to clinic for a follow up visit regarding     ICD-10-CM ICD-9-CM   1. Primary osteoarthritis of right knee  M17.11 715.16        Patient is here today for right knee injection       Past Medical History:   Diagnosis Date    Abdominal hernia     Basal cell carcinoma     Cardiac murmur     GERD (gastroesophageal reflux disease)     Hyperlipidemia     Hypertension      Past Surgical History:   Procedure Laterality Date    basil cell carcinoma      left cheek    DILATION AND CURETTAGE OF UTERUS      HYSTERECTOMY      LAPAROSCOPIC CHOLECYSTECTOMY N/A 05/26/2022    Procedure: CHOLECYSTECTOMY, LAPAROSCOPIC;  Surgeon: Gil Monique DO;  Location: [facility];  Service: General;  Laterality: N/A;    TEAR DUCT SURGERY      right eye    TOTAL ABDOMINAL HYSTERECTOMY W/ BILATERAL SALPINGOOPHORECTOMY  01/18/2000         PHYSICAL EXAMINATION:    General    Constitutional: She is oriented to person, place, and time. She appears well-nourished.   HENT:   Head: Normocephalic and atraumatic.   Eyes: Pupils are equal, round, and reactive to light.   Neck: Neck supple.   Cardiovascular:  Normal rate and regular rhythm.            Pulmonary/Chest: Effort normal. No respiratory distress.   Abdominal: There is no abdominal tenderness. There is no guarding.   Neurological: She is alert and oriented to person, place, and time. She has normal reflexes.   Psychiatric: She has a normal mood and affect. Her behavior is normal. Judgment and thought content normal.           Right Knee Exam     Inspection   Swelling: present  Effusion: absent    Tenderness   The patient is tender to palpation of the medial joint line and lateral joint line.    Crepitus   The patient has crepitus of the patella.    Range of Motion   Extension:  normal   Flexion:  normal     Tests   Meniscus   Wei:  Medial - positive   Patella   Patellar Tracking: normal  Patellar Grind: positive    Other   Sensation: normal    Muscle Strength    Right Lower Extremity   Quadriceps:  5/5   Hamstrin/5     Reflexes     Right Side   Achilles:  2+    Vascular Exam     Right Pulses  Dorsalis Pedis:      2+          IMAGING:    X-Ray Knee Complete 4 Or More Views Right    Result Date: 2024  EXAMINATION: XR KNEE COMP 4 OR MORE VIEWS RIGHT CLINICAL HISTORY: Unilateral primary osteoarthritis, right knee COMPARISON: Right knee x-ray 2022 TECHNIQUE: Frontal, lateral, oblique, and sunrise views of the right knee. FINDINGS: Minimal degenerative change of the knee.  No convincing acute fracture or dislocation demonstrated. No concerning radiopaque foreign body visualized.     As above. Point of Service: [facility] Electronically signed by: Tacos Graham Date:    2024 Time:    09:04    ASSESSMENT:      ICD-10-CM ICD-9-CM   1. Primary osteoarthritis of right knee  M17.11 715.16       PLAN:     -Findings and treatment options were discussed with the patient  -All questions answered    Right knee injection today  RTC as needed  HEP    There are no Patient Instructions on file for this visit.      No orders of the defined types were placed in this encounter.        Large Joint Aspiration/Injection: R supra patellar bursa    Date/Time: 2024 9:20 AM    Performed by: Nicole Mazariegos FNP  Authorized by: Nicole Mazariegos FNP    Consent Done?:  Yes (Verbal)  Indications:  Pain  Site marked: the procedure site was marked    Local anesthetic:  Bupivacaine 0.25% without epinephrine    Details:  Needle Size:  22 G  Location:  Knee  Site:  R supra patellar bursa  Medications:  40 mg triamcinolone acetonide 40 mg/mL  Patient tolerance:  Patient tolerated the procedure well with no immediate complications

## 2024-08-14 NOTE — PROGRESS NOTES
CC:  Knee pain    79 y.o. Female returns to clinic for a follow up visit regarding knee pain.       Patient had a steroid injection 10/12/2023. She states that injection worked well for her and she has just now started having pain again.        Past Medical History:   Diagnosis Date    Abdominal hernia     Basal cell carcinoma     Cardiac murmur     GERD (gastroesophageal reflux disease)     Hyperlipidemia     Hypertension      Past Surgical History:   Procedure Laterality Date    basil cell carcinoma      left cheek    DILATION AND CURETTAGE OF UTERUS      HYSTERECTOMY      LAPAROSCOPIC CHOLECYSTECTOMY N/A 05/26/2022    Procedure: CHOLECYSTECTOMY, LAPAROSCOPIC;  Surgeon: Gil Monique DO;  Location: [facility];  Service: General;  Laterality: N/A;    TEAR DUCT SURGERY      right eye    TOTAL ABDOMINAL HYSTERECTOMY W/ BILATERAL SALPINGOOPHORECTOMY  01/18/2000         PHYSICAL EXAMINATION:  There were no vitals taken for this visit.  Ortho/SPM Exam  ***    IMAGING:  US Biopsy Lymph Node Axilla    Addendum Date: 8/12/2024    The biopsy device was 14 gauge, not 13 .    Result Date: 8/12/2024  History:  Abnormal right axillary lymph node Result: US Biopsy Lymph Node Axilla The patient was positioned supine, and the area of interest was localized using real-time ultrasound guidance. After antiseptic preparation, the skin puncture site was draped and infiltrated. Deep local anesthesia about the biopsy site was administered. A skin incision was made with an 11 blade.  A 13-gauge spring loaded automated core biopsy needle was advanced to the target. 3 tissue cores were obtained through the target. There was significant residual lymph node visualized post biopsy. A coil tissue marker clip was then deployed at the biopsy site. Continuous real-time ultrasound was used for guidance throughout the procedure. Hemostasis was achieved and appropriate post-procedural dressing was applied. The patient tolerated the procedure  well, and there was no evidence of immediate complication. The patient was given verbal and written post procedural instructions prior to release from the department.  The tissue cores were submitted to surgical pathology in formalin for histologic analysis. Flow cytometry study also ordered. Ultrasound was obtained post procedure, and this demonstrates that the tissue marker clip is in the expected position. The biopsy was personally performed by me      Ultrasound-guided biopsy of right breast lymph node was performed with placement of coil. Pathology pending.     X-Ray Knee Complete 4 Or More Views Right    Result Date: 8/8/2024  EXAMINATION: XR KNEE COMP 4 OR MORE VIEWS RIGHT CLINICAL HISTORY: Unilateral primary osteoarthritis, right knee COMPARISON: Right knee x-ray October 19, 2022 TECHNIQUE: Frontal, lateral, oblique, and sunrise views of the right knee. FINDINGS: Minimal degenerative change of the knee.  No convincing acute fracture or dislocation demonstrated. No concerning radiopaque foreign body visualized.     As above. Point of Service: [facility] Electronically signed by: Tacos Graham Date:    08/08/2024 Time:    09:04    US Breast Right Limited    Result Date: 7/29/2024  Result: US Breast Right Limited.  Real time ultrasound images captured and stored.   History: Patient is 79 y.o. and is seen for abnormal mammogram. Films Compared: Compared to: 04/22/2024 Mammo Digital Diagnostic Right with Zhao, 04/22/2024 US Breast Right Limited, and 02/01/2024 Mammo Digital Screening Bilat w/ Zhao  Findings:  There is a lymph node with circumscribed margins seen in the right axilla. Cortical thickness measures 4 mm on the left. Discussed with the patient. The node is of similar size. We discussed biopsy to have a diagnosis of this entity. Will schedule a biopsy when feasible.     Right Lymph Node: Right axilla lymph node. Assessment: 4 - Suspicious finding. Biopsy is recommended. BI-RADS Category:  Overall: 4 - Suspicious  Recommendation: Biopsy is recommended. Your estimated lifetime risk of breast cancer (to age 85) based on Tyrer-Cuzick risk assessment model is 5.44%.  According to the [employer] Society, patients with a lifetime breast cancer risk of 20% or higher might benefit from supplemental screening tests, such as screening breast MRI.      ***  ASSESSMENT:      ICD-10-CM ICD-9-CM   1. Primary osteoarthritis of right knee  M17.11 715.16       PLAN:     -Findings and treatment options were discussed with the patient  -All questions answered  {plan; knee pain:02568}  ***    There are no Patient Instructions on file for this visit.      No orders of the defined types were placed in this encounter.        Procedures

## 2024-08-15 LAB
DHEA SERPL-MCNC: NORMAL
ESTROGEN SERPL-MCNC: NORMAL PG/ML
INSULIN SERPL-ACNC: NORMAL U[IU]/ML
LAB AP CLINICAL INFORMATION: NORMAL
LAB AP GROSS DESCRIPTION: NORMAL
LAB AP LABORATORY NOTES: NORMAL
T3RU NFR SERPL: NORMAL %

## 2024-08-27 ENCOUNTER — OFFICE VISIT (OUTPATIENT)
Dept: VASCULAR SURGERY | Facility: CLINIC | Age: 79
End: 2024-08-27
Payer: MEDICARE

## 2024-08-27 VITALS
HEIGHT: 62 IN | BODY MASS INDEX: 25.1 KG/M2 | HEART RATE: 75 BPM | RESPIRATION RATE: 16 BRPM | DIASTOLIC BLOOD PRESSURE: 71 MMHG | SYSTOLIC BLOOD PRESSURE: 123 MMHG | WEIGHT: 136.38 LBS

## 2024-08-27 DIAGNOSIS — R25.2 LEG CRAMPING: ICD-10-CM

## 2024-08-27 DIAGNOSIS — I83.93 VARICOSE VEINS OF BOTH LOWER EXTREMITIES, UNSPECIFIED WHETHER COMPLICATED: ICD-10-CM

## 2024-08-27 DIAGNOSIS — M79.605 LEG PAIN, BILATERAL: Primary | ICD-10-CM

## 2024-08-27 DIAGNOSIS — M79.604 LEG PAIN, BILATERAL: Primary | ICD-10-CM

## 2024-08-27 PROCEDURE — 99215 OFFICE O/P EST HI 40 MIN: CPT | Mod: PBBFAC | Performed by: FAMILY MEDICINE

## 2024-08-27 PROCEDURE — 99203 OFFICE O/P NEW LOW 30 MIN: CPT | Mod: S$PBB,,, | Performed by: FAMILY MEDICINE

## 2024-08-27 PROCEDURE — 99999 PR PBB SHADOW E&M-EST. PATIENT-LVL V: CPT | Mod: PBBFAC,,, | Performed by: FAMILY MEDICINE

## 2024-08-27 NOTE — PROGRESS NOTES
VEIN CENTER CLINIC NOTE    Patient ID: Jennifer Hale is a 79 y.o. female.    I. HISTORY     Chief Complaint:   Chief Complaint   Patient presents with    Varicose Veins     NP; REF BY RADHA VALDEZ; VARICOSE VEINS        HPI: Jennifer Hale is a 79 y.o. female who is referred here today by Dr. Valdez for evaluation of lower extremity discomfort, cramping and varicose veins.  Symptoms are progressive/worsening and began greater than 6 months ago.  Location is bilateral lower extremities below the knees, worse on the right. Symptoms are worse at the end of the day.  History of venous interventions includes none.  Denies family history of venous disease.  Denies history of DVT or cellulitis.      Venous Disease Medical Necessity Documentation Initial Visit Date:  08/27/2024 Return Check Date:    Have you ever had a rupture or bleed from a varicose vein in your leg(s)?              [] Yes  [x] No   [] Yes   [] No   Have you ever been diagnosed with phlebitis, cellulitis, or inflammation in the area of the varicose veins of  your leg(s)?  [] Yes  [x] No    [] Yes   [] No   Do you have darkened or inflamed skin on your legs?   [x] Yes   [] No   [] Yes   [] No   Do you have leg swelling?     [] Yes   [x] No   [] Yes   [] No   Do you have leg pain?   [x] Yes   [] No   [] Yes   [] No   If yes, describe the type of pain?    []   Stabbing []  Radiating [x]  Aching   []  Tightness []  Throbbing               []  Burning []  Cramping              Do you have leg discomfort?   [x] Yes   [] No   [] Yes   [] No   If yes, describe the type of discomfort?    []  Heaviness []  Fullness   [x]  Restlessness [] Tired/Fatigued [] Itching              Have you ever worn compression hose?   [] Yes   [x] No   [] Yes   [] No   If yes, how long?           Do you elevate your legs while sitting?   [x] Yes   [] No   [] Yes   [] No   Does venous disease (varicose veins, ulcers, skin changes, leg pain/swelling) interfere with your daily life?   If yes, check activities you are limited or unable to do.    []  Shower  []   Walk  []  Exercise  [] Play with children/grandchildren  []  Shop [] Work [] Stand for any period of time [] Sleep                               [] Sitting for an extended period of time.           [] Yes   [x] No   [] Yes   [] No   Do you exercise/have you tried to exercise (i.e.  Walk our participate in a regular exercise routine)?  [x] Yes  [] No   [] Yes   [] No   BMI   24.95           Past Medical History:   Diagnosis Date    Abdominal hernia     Basal cell carcinoma     Cardiac murmur     GERD (gastroesophageal reflux disease)     Hyperlipidemia     Hypertension         Past Surgical History:   Procedure Laterality Date    basil cell carcinoma      left cheek    DILATION AND CURETTAGE OF UTERUS      HYSTERECTOMY      LAPAROSCOPIC CHOLECYSTECTOMY N/A 05/26/2022    Procedure: CHOLECYSTECTOMY, LAPAROSCOPIC;  Surgeon: Gil Monique DO;  Location: [facility];  Service: General;  Laterality: N/A;    TEAR DUCT SURGERY      right eye    TOTAL ABDOMINAL HYSTERECTOMY W/ BILATERAL SALPINGOOPHORECTOMY  01/18/2000       Social History     Tobacco Use   Smoking Status Never    Passive exposure: Past   Smokeless Tobacco Never         Current Outpatient Medications:     ascorbic acid, vitamin C, (VITAMIN C) 500 MG tablet, Vitamin C 500 MG Oral Tablet QTY: 0 tablet Days: 0 Refills: 0  Written: 05/11/22 Patient Instructions:, Disp: , Rfl:     cholecalciferol, vitamin D3, (VITAMIN D3) 50 mcg (2,000 unit) Cap capsule, Vitamin D3 50 MCG (2000 UT) Oral Capsule QTY: 0 capsule Days: 0 Refills: 0  Written: 05/11/22 Patient Instructions:, Disp: , Rfl:     estradioL (IMVEXXY MAINTENANCE PACK) 4 mcg Inst, Place 1 suppository vaginally twice a week. At bedtime, Disp: 8 each, Rfl: 11    fluticasone propionate (FLONASE) 50 mcg/actuation nasal spray, 1 spray (50 mcg total) by Each Nostril route once daily., Disp: 48 g, Rfl: 3    multivitamin capsule, Take 1  capsule by mouth once daily., Disp: , Rfl:     omega 3-dha-epa-fish oil (FISH OIL) 60- mg Cap, 1 capsule., Disp: , Rfl:     triamcinolone acetonide 0.025% (KENALOG) 0.025 % Oint, Apply topically 2 (two) times daily as needed (soreness itching to scalp wound from wasp sting)., Disp: 80 g, Rfl: 0    triamterene-hydrochlorothiazide 37.5-25 mg (MAXZIDE-25) 37.5-25 mg per tablet, Take 1 tablet by mouth once daily., Disp: 90 tablet, Rfl: 1    Review of Systems   Constitutional:  Negative for activity change, chills, diaphoresis, fatigue and fever.   Respiratory:  Negative for cough and shortness of breath.    Cardiovascular:  Negative for chest pain and claudication.        Hyperpigmentation LE   Gastrointestinal:  Negative for nausea and vomiting.   Musculoskeletal:  Positive for leg pain. Negative for joint swelling.   Integumentary:  Negative for rash and wound.   Neurological:  Negative for weakness and numbness.          II. PHYSICAL EXAM     Physical Exam  Constitutional:       General: She is awake. She is not in acute distress.     Appearance: Normal appearance. She is not ill-appearing or toxic-appearing.   HENT:      Head: Normocephalic and atraumatic.   Eyes:      Extraocular Movements: Extraocular movements intact.      Conjunctiva/sclera: Conjunctivae normal.      Pupils: Pupils are equal, round, and reactive to light.   Neck:      Vascular: No carotid bruit or JVD.   Cardiovascular:      Rate and Rhythm: Normal rate and regular rhythm.      Pulses:           Dorsalis pedis pulses are detected w/ Doppler on the right side and detected w/ Doppler on the left side.        Posterior tibial pulses are detected w/ Doppler on the right side and detected w/ Doppler on the left side.      Heart sounds: No murmur heard.  Pulmonary:      Effort: Pulmonary effort is normal. No respiratory distress.      Breath sounds: No stridor. No wheezing, rhonchi or rales.   Musculoskeletal:         General: No swelling,  tenderness or deformity.      Right lower leg: Edema present.      Left lower leg: No edema.      Comments: No evidence of cellulitis or open ulceration bilaterally.   Feet:      Comments: Triphasic hand-held dopplerable pulses of the bilateral dorsalis pedis and posterior tibial arteries.  Skin:     General: Skin is warm.      Capillary Refill: Capillary refill takes less than 2 seconds.      Coloration: Skin is not ashen.      Findings: No bruising, erythema, lesion, rash or wound.   Neurological:      Mental Status: She is alert and oriented to person, place, and time.      Motor: No weakness.   Psychiatric:         Speech: Speech normal.         Behavior: Behavior normal. Behavior is cooperative.         Reticular/Spider veins noted:  RLE: anterior calf, medial calf, posterior calf, and lateral calf  LLE: anterior calf, medial calf, posterior calf, and lateral calf    Varicose veins noted:  RLE: medial calf  LLE:  medial calf    CEAP Classification                       Venous Clinical Severity Score     III. ASSESSMENT & PLAN (MEDICAL DECISION MAKING)     1. Leg pain, bilateral    2. Varicose veins of both lower extremities, unspecified whether complicated    3. Leg cramping        Assessment/Diagnosis and Plan:  Patient has complaints, symptoms and physical exam findings of chronic venous disease.  Therefore, I will order a bilateral complete venous reflux study and see the patient back with results.    Orders Placed This Encounter    US Venous Reflux Study Bilateral          Pelon Nagel DO

## 2024-09-03 ENCOUNTER — HOSPITAL ENCOUNTER (OUTPATIENT)
Dept: RADIOLOGY | Facility: HOSPITAL | Age: 79
Discharge: HOME | End: 2024-09-03
Attending: FAMILY MEDICINE
Payer: MEDICARE

## 2024-09-03 ENCOUNTER — OFFICE VISIT (OUTPATIENT)
Dept: VASCULAR SURGERY | Facility: CLINIC | Age: 79
End: 2024-09-03
Attending: FAMILY MEDICINE
Payer: MEDICARE

## 2024-09-03 VITALS
BODY MASS INDEX: 25.11 KG/M2 | HEIGHT: 62 IN | DIASTOLIC BLOOD PRESSURE: 68 MMHG | RESPIRATION RATE: 18 BRPM | SYSTOLIC BLOOD PRESSURE: 126 MMHG | WEIGHT: 136.44 LBS | HEART RATE: 70 BPM

## 2024-09-03 DIAGNOSIS — I87.2 VENOUS INSUFFICIENCY: ICD-10-CM

## 2024-09-03 DIAGNOSIS — M79.605 LEG PAIN, BILATERAL: ICD-10-CM

## 2024-09-03 DIAGNOSIS — M79.604 LEG PAIN, BILATERAL: ICD-10-CM

## 2024-09-03 DIAGNOSIS — I83.93 VARICOSE VEINS OF BOTH LOWER EXTREMITIES, UNSPECIFIED WHETHER COMPLICATED: Primary | ICD-10-CM

## 2024-09-03 DIAGNOSIS — R25.2 LEG CRAMPING: ICD-10-CM

## 2024-09-03 PROCEDURE — 93970 EXTREMITY STUDY: CPT | Mod: TC

## 2024-09-03 PROCEDURE — 99999 PR PBB SHADOW E&M-EST. PATIENT-LVL IV: CPT | Mod: PBBFAC,,, | Performed by: FAMILY MEDICINE

## 2024-09-03 PROCEDURE — 99214 OFFICE O/P EST MOD 30 MIN: CPT | Mod: PBBFAC,25 | Performed by: FAMILY MEDICINE

## 2024-09-03 PROCEDURE — 99214 OFFICE O/P EST MOD 30 MIN: CPT | Mod: S$PBB,,, | Performed by: FAMILY MEDICINE

## 2024-09-03 NOTE — PROGRESS NOTES
VEIN CENTER CLINIC NOTE    Patient ID: Jennifer Hale is a 79 y.o. female.    I. HISTORY     Chief Complaint:   Chief Complaint   Patient presents with    Follow-up     US CINTIA COMP RESULTS        HPI: Jennifer Hale is a 79 y.o. female who presents today 09/03/2024 for follow-up and results to a bilateral complete venous study.  This study shows no evidence of DVT bilaterally.  The study also shows dilation and proximal reflux of the bilateral great saphenous veins.  No reflux noted in the bilateral small saphenous veins.    The patient was initially seen on 08/27/2024 Dr. Lui for evaluation of lower extremity discomfort, cramping and varicose veins.  Symptoms are progressive/worsening and began greater than 6 months ago.  Location is bilateral lower extremities below the knees, worse on the right. Symptoms are worse at the end of the day.  History of venous interventions includes none.  Denies family history of venous disease.  Denies history of DVT or cellulitis.    Venous Disease Medical Necessity Documentation Initial Visit Date:  08/27/2024 Return Check Date:    Have you ever had a rupture or bleed from a varicose vein in your leg(s)?              [] Yes  [x] No   [] Yes   [] No   Have you ever been diagnosed with phlebitis, cellulitis, or inflammation in the area of the varicose veins of  your leg(s)?  [] Yes  [x] No    [] Yes   [] No   Do you have darkened or inflamed skin on your legs?   [x] Yes   [] No   [] Yes   [] No   Do you have leg swelling?     [] Yes   [x] No   [] Yes   [] No   Do you have leg pain?   [x] Yes   [] No   [] Yes   [] No   If yes, describe the type of pain?    []   Stabbing []  Radiating [x]  Aching   []  Tightness []  Throbbing               []  Burning []  Cramping              Do you have leg discomfort?   [x] Yes   [] No   [] Yes   [] No   If yes, describe the type of discomfort?    []  Heaviness []  Fullness   [x]  Restlessness [] Tired/Fatigued [] Itching              Have you  ever worn compression hose?   [] Yes   [x] No   [] Yes   [] No   If yes, how long?           Do you elevate your legs while sitting?   [x] Yes   [] No   [] Yes   [] No   Does venous disease (varicose veins, ulcers, skin changes, leg pain/swelling) interfere with your daily life?  If yes, check activities you are limited or unable to do.    []  Shower  []   Walk  []  Exercise  [] Play with children/grandchildren  []  Shop [] Work [] Stand for any period of time [] Sleep                               [] Sitting for an extended period of time.           [] Yes   [x] No   [] Yes   [] No   Do you exercise/have you tried to exercise (i.e.  Walk our participate in a regular exercise routine)?  [x] Yes  [] No   [] Yes   [] No   BMI   24.95           Past Medical History:   Diagnosis Date    Abdominal hernia     Basal cell carcinoma     Cardiac murmur     GERD (gastroesophageal reflux disease)     Hyperlipidemia     Hypertension         Past Surgical History:   Procedure Laterality Date    basil cell carcinoma      left cheek    DILATION AND CURETTAGE OF UTERUS      HYSTERECTOMY      LAPAROSCOPIC CHOLECYSTECTOMY N/A 05/26/2022    Procedure: CHOLECYSTECTOMY, LAPAROSCOPIC;  Surgeon: Gil Monique DO;  Location: [facility];  Service: General;  Laterality: N/A;    TEAR DUCT SURGERY      right eye    TOTAL ABDOMINAL HYSTERECTOMY W/ BILATERAL SALPINGOOPHORECTOMY  01/18/2000       Social History     Tobacco Use   Smoking Status Never    Passive exposure: Past   Smokeless Tobacco Never         Current Outpatient Medications:     ascorbic acid, vitamin C, (VITAMIN C) 500 MG tablet, Vitamin C 500 MG Oral Tablet QTY: 0 tablet Days: 0 Refills: 0  Written: 05/11/22 Patient Instructions:, Disp: , Rfl:     cholecalciferol, vitamin D3, (VITAMIN D3) 50 mcg (2,000 unit) Cap capsule, Vitamin D3 50 MCG (2000 UT) Oral Capsule QTY: 0 capsule Days: 0 Refills: 0  Written: 05/11/22 Patient Instructions:, Disp: , Rfl:     estradioL (IMVEXXY  MAINTENANCE PACK) 4 mcg Inst, Place 1 suppository vaginally twice a week. At bedtime, Disp: 8 each, Rfl: 11    fluticasone propionate (FLONASE) 50 mcg/actuation nasal spray, 1 spray (50 mcg total) by Each Nostril route once daily., Disp: 48 g, Rfl: 3    multivitamin capsule, Take 1 capsule by mouth once daily., Disp: , Rfl:     omega 3-dha-epa-fish oil (FISH OIL) 60- mg Cap, 1 capsule., Disp: , Rfl:     triamcinolone acetonide 0.025% (KENALOG) 0.025 % Oint, Apply topically 2 (two) times daily as needed (soreness itching to scalp wound from wasp sting)., Disp: 80 g, Rfl: 0    triamterene-hydrochlorothiazide 37.5-25 mg (MAXZIDE-25) 37.5-25 mg per tablet, Take 1 tablet by mouth once daily., Disp: 90 tablet, Rfl: 1    Review of Systems   Constitutional:  Negative for activity change, chills, diaphoresis, fatigue and fever.   Respiratory:  Negative for cough and shortness of breath.    Cardiovascular:  Negative for chest pain and claudication.        Hyperpigmentation LE   Gastrointestinal:  Negative for nausea and vomiting.   Musculoskeletal:  Positive for leg pain. Negative for joint swelling.   Integumentary:  Negative for rash and wound.   Neurological:  Negative for weakness and numbness.          II. PHYSICAL EXAM     Physical Exam  Constitutional:       General: She is awake. She is not in acute distress.     Appearance: Normal appearance. She is not ill-appearing or toxic-appearing.   HENT:      Head: Normocephalic and atraumatic.   Eyes:      Extraocular Movements: Extraocular movements intact.      Conjunctiva/sclera: Conjunctivae normal.      Pupils: Pupils are equal, round, and reactive to light.   Neck:      Vascular: No carotid bruit or JVD.   Cardiovascular:      Rate and Rhythm: Normal rate and regular rhythm.      Pulses:           Dorsalis pedis pulses are detected w/ Doppler on the right side and detected w/ Doppler on the left side.        Posterior tibial pulses are detected w/ Doppler on the  right side and detected w/ Doppler on the left side.      Heart sounds: No murmur heard.  Pulmonary:      Effort: Pulmonary effort is normal. No respiratory distress.      Breath sounds: No stridor. No wheezing, rhonchi or rales.   Musculoskeletal:         General: No swelling, tenderness or deformity.      Right lower leg: Edema present.      Left lower leg: No edema.      Comments: No evidence of cellulitis or open ulceration bilaterally.   Feet:      Comments: Triphasic hand-held dopplerable pulses of the bilateral dorsalis pedis and posterior tibial arteries.  Skin:     General: Skin is warm.      Capillary Refill: Capillary refill takes less than 2 seconds.      Coloration: Skin is not ashen.      Findings: No bruising, erythema, lesion, rash or wound.   Neurological:      Mental Status: She is alert and oriented to person, place, and time.      Motor: No weakness.   Psychiatric:         Speech: Speech normal.         Behavior: Behavior normal. Behavior is cooperative.         Reticular/Spider veins noted:  RLE: anterior calf, medial calf, posterior calf, and lateral calf  LLE: anterior calf, medial calf, posterior calf, and lateral calf    Varicose veins noted:  RLE: medial calf  LLE:  medial calf    CEAP Classification  Clinical Signs: Class 4 - Skin changes ascribed to venous disease  Etiologic Classification: Primary  Anatomic distribution: Superficial  Pathophysiologic dysfunction: Reflux                         Venous Clinical Severity Score  Pain:2=Daily, moderate activity limitation, occasional analgesics  Varicose Veins: 1=Few, scattered. Branch varicose veins  Venous Edema: 1=Evening ankle edema only  Pigmentation: 1=Diffuse, but limited in area and old (brown)  Inflammation: 0=None  Induration: 0=None  Number of Active Ulcers: 0=0  Active Ulceration, Duration: 0=None  Active Ulcer Size: 0=None  Compressive Therapy: 0=Not used or not compliant  Total Score: 5       III. ASSESSMENT & PLAN (MEDICAL  DECISION MAKING)     1. Varicose veins of both lower extremities, unspecified whether complicated    2. Leg pain, bilateral    3. Leg cramping    4. Venous insufficiency        Assessment/Diagnosis and Plan:  Ultrasound of lower extremities reveals positive evidence of venous insufficiency in the bilateral great saphenous veins.  Plan for conservative medical treatment at this time. The patient may benefit from endovenous ablation in the future.     - Start compression with 15-20 mmHg Rx stockings  - Therapeutic leg elevation  - Calf pumping exercises  - RTC 3 months for further evaluation          Pelon Nagel DO

## 2024-09-11 ENCOUNTER — TELEPHONE (OUTPATIENT)
Dept: OBSTETRICS AND GYNECOLOGY | Facility: CLINIC | Age: 79
End: 2024-09-11
Payer: MEDICARE

## 2024-09-11 NOTE — TELEPHONE ENCOUNTER
Left a message for pt to return call to clinic and that I would reach out through the portal.       ----- Message from Darlene Perfecto sent at 9/5/2024  3:39 PM CDT -----  Regarding: RX refill  Who Called: Jennifer Hale    Refill or New Rx:Refill  RX Name and Strength:estradioL (IMVEXXY MAINTENANCE PACK) 4 mcg Inst  How is the patient currently taking it? (ex. 1XDay):2xweek  Is this a 30 day or 90 day RX:8  Local or Mail Order:  List of preferred pharmacies on file (remove unneeded): [unfilled]  If different Pharmacy is requested, enter Pharmacy information here including location and phone number: [id number] - [place], [place] - [address] AT [facility] [address]  & [facility]  [address] [place] [address]  Phone: [phone] Fax: [phone]       Ordering Provider:Tito Miguel       Preferred Method of Contact: Phone Call  Patient's Preferred Phone Number on File: [phone]   Best Call Back Number, if different:  Additional Information:

## 2024-09-25 ENCOUNTER — OFFICE VISIT (OUTPATIENT)
Dept: FAMILY MEDICINE | Facility: CLINIC | Age: 79
End: 2024-09-25
Payer: MEDICARE

## 2024-09-25 VITALS
RESPIRATION RATE: 19 BRPM | BODY MASS INDEX: 25.21 KG/M2 | DIASTOLIC BLOOD PRESSURE: 76 MMHG | TEMPERATURE: 98 F | SYSTOLIC BLOOD PRESSURE: 130 MMHG | OXYGEN SATURATION: 95 % | HEIGHT: 62 IN | HEART RATE: 67 BPM | WEIGHT: 137 LBS

## 2024-09-25 DIAGNOSIS — R59.1 LYMPHADENOPATHY: ICD-10-CM

## 2024-09-25 DIAGNOSIS — E78.5 HYPERLIPIDEMIA, UNSPECIFIED HYPERLIPIDEMIA TYPE: Primary | ICD-10-CM

## 2024-09-25 PROCEDURE — 99213 OFFICE O/P EST LOW 20 MIN: CPT | Mod: ,,, | Performed by: FAMILY MEDICINE

## 2024-09-25 RX ORDER — ATORVASTATIN CALCIUM 10 MG/1
10 TABLET, FILM COATED ORAL DAILY
Qty: 90 TABLET | Refills: 3 | Status: SHIPPED | OUTPATIENT
Start: 2024-09-25 | End: 2025-09-25

## 2024-09-25 NOTE — PROGRESS NOTES
Jennifer Hale is a 79 y.o. female seen today for follow-up on her hypertension.  Her blood pressures are well controlled and her cardiac evaluation is currently ongoing.  Recently she did well with her stress test.  Patient does have diffuse lymphadenopathy but her biopsies were negative for lymphoma.  We discussed an Ace level today.    Past Medical History:   Diagnosis Date    Abdominal hernia     Basal cell carcinoma     Cardiac murmur     GERD (gastroesophageal reflux disease)     Hyperlipidemia     Hypertension      Family History   Problem Relation Name Age of Onset    Breast cancer Mother      Lymphoma Mother      Hypertension Mother      Lymphoma Father      Breast cancer Sister      Lymphoma Sister      Colon cancer Maternal Grandfather      Lymphoma Brother      Breast cancer Maternal Cousin      Breast cancer Maternal Cousin      Breast cancer Maternal Cousin       Current Outpatient Medications on File Prior to Visit   Medication Sig Dispense Refill    ascorbic acid, vitamin C, (VITAMIN C) 500 MG tablet Vitamin C 500 MG Oral Tablet QTY: 0 tablet Days: 0 Refills: 0  Written: 05/11/22 Patient Instructions:      cholecalciferol, vitamin D3, (VITAMIN D3) 50 mcg (2,000 unit) Cap capsule Vitamin D3 50 MCG (2000 UT) Oral Capsule QTY: 0 capsule Days: 0 Refills: 0  Written: 05/11/22 Patient Instructions:      fluticasone propionate (FLONASE) 50 mcg/actuation nasal spray 1 spray (50 mcg total) by Each Nostril route once daily. 48 g 3    multivitamin capsule Take 1 capsule by mouth once daily.      omega 3-dha-epa-fish oil (FISH OIL) 60- mg Cap 1 capsule.      triamcinolone acetonide 0.025% (KENALOG) 0.025 % Oint Apply topically 2 (two) times daily as needed (soreness itching to scalp wound from wasp sting). 80 g 0    triamterene-hydrochlorothiazide 37.5-25 mg (MAXZIDE-25) 37.5-25 mg per tablet Take 1 tablet by mouth once daily. 90 tablet 1    estradioL (IMVEXXY MAINTENANCE PACK) 4 mcg Inst Place 1  suppository vaginally twice a week. At bedtime 8 each 11    [DISCONTINUED] tolterodine (DETROL LA) 4 MG 24 hr capsule Take 1 capsule (4 mg total) by mouth once daily. (Patient not taking: Reported on 1/12/2022) 30 capsule 2     No current facility-administered medications on file prior to visit.     Immunization History   Administered Date(s) Administered    COVID-19 MRNA, LN-S PF (MODERNA HALF 0.25 ML DOSE) 11/05/2021, 07/01/2022    COVID-19, MRNA, LN-S, PF (MODERNA FULL 0.5 ML DOSE) 03/01/2021    COVID-19, mRNA, LNP-S, bivalent booster, PF (Moderna Omicron)12 + YEARS 12/16/2022    Hepatitis A, Adult 09/28/2022, 03/28/2023    Influenza 10/01/2021, 10/10/2023       Review of Systems   Constitutional:  Negative for fever, malaise/fatigue and weight loss.   Respiratory:  Negative for shortness of breath.    Cardiovascular:  Negative for chest pain and palpitations.   Gastrointestinal:  Negative for nausea and vomiting.   Psychiatric/Behavioral:  Negative for depression.         Vitals:    09/25/24 1017   BP: 130/76   Pulse: 67   Resp: 19   Temp: 97.7 °F (36.5 °C)       Physical Exam  Vitals reviewed.   Constitutional:       Appearance: Normal appearance.   HENT:      Head: Normocephalic.   Eyes:      Extraocular Movements: Extraocular movements intact.      Conjunctiva/sclera: Conjunctivae normal.      Pupils: Pupils are equal, round, and reactive to light.   Neck:      Thyroid: No thyroid mass or thyromegaly.   Cardiovascular:      Rate and Rhythm: Normal rate and regular rhythm.      Heart sounds: Normal heart sounds. No murmur heard.     No gallop.   Pulmonary:      Effort: Pulmonary effort is normal. No respiratory distress.      Breath sounds: Normal breath sounds. No wheezing or rales.   Skin:     General: Skin is warm and dry.      Coloration: Skin is not jaundiced or pale.   Neurological:      Mental Status: She is alert.   Psychiatric:         Mood and Affect: Mood normal.         Behavior: Behavior normal.          Thought Content: Thought content normal.         Judgment: Judgment normal.          Assessment and Plan  1. Hyperlipidemia, unspecified hyperlipidemia type  -     atorvastatin (LIPITOR) 10 MG tablet; Take 1 tablet (10 mg total) by mouth once daily.  Dispense: 90 tablet; Refill: 3    2. Lymphadenopathy  -     Angiotensin Converting Enzyme; Future; Expected date: 09/25/2024             Return to clinic in 3 months for follow-up or as needed.  She will stop in next week for her Prevnar 20 injection planning to get the flu and COVID today at her local pharmacy.    Health Maintenance Topics with due status: Not Due       Topic Last Completion Date    DEXA Scan 09/07/2023    Lipid Panel 07/30/2024

## 2024-09-25 NOTE — PROGRESS NOTES
Return Gyn Office Visit    Assessment/Plan  Problem List Items Addressed This Visit    None  Visit Diagnoses       Cystocele with rectocele    -  Primary    Atrophic vaginitis              Cystocele and rectocele have essentially resolved since pelvic floor PT.  Atrophic vaginitis has significantly improved on the Imvexxy.   Continue Imvexxy.   Continue home pelvic floor PT exercises.   No further treatment recommended at this time.  RTC annually and/or prn.     CC:   Chief Complaint   Patient presents with    Follow-up     Pt wants to talk about the estradiol since she has a high family hx of breast cancer.      HPI:  79 y.o. who presents to office for 4 month follow up for cystocele and rectocele after Pelvic Floor PT. She states that she went to pelvic floor PT and this went well. She denies any pelvic pressure or urinary symptoms like she was having.  She has been doing the home exercises as well. She is happy with the results.  She has been using the Imvexxy and this is working well for the vaginal dryness.    Review of Systems - The following ROS was otherwise negative, except as noted in the HPI:  constitutional, respiratory, cardiovascular, gastrointestinal, genitourinary    Objective:  /72 (BP Location: Left arm, Patient Position: Sitting)   Pulse 72   SpO2 99%   General: Alert, well appearing, no acute distress  Abdomen: Soft, nontender, nondistended   Pelvic: normal External genitalia without masses or lesions.  Pale, pink vagina with minimal clear discharge. Mild atrophic vaginitis. Cystocele and rectocele are both stage 1. Significant improvement. Cervix and uterus surgically absent. Adnexa palpated bilaterally without masses or tenderness.  Bimanual examination without masses or tenderness.  Exam chaperoned by female assistant  Extremities: No redness or tenderness, neg Royal's sign  Psych: Normal mood and behavior.      Tito Miguel MD

## 2024-09-26 ENCOUNTER — OFFICE VISIT (OUTPATIENT)
Dept: OBSTETRICS AND GYNECOLOGY | Facility: CLINIC | Age: 79
End: 2024-09-26
Payer: MEDICARE

## 2024-09-26 VITALS — DIASTOLIC BLOOD PRESSURE: 72 MMHG | HEART RATE: 72 BPM | SYSTOLIC BLOOD PRESSURE: 126 MMHG | OXYGEN SATURATION: 99 %

## 2024-09-26 DIAGNOSIS — N81.6 CYSTOCELE WITH RECTOCELE: Primary | ICD-10-CM

## 2024-09-26 DIAGNOSIS — N81.10 CYSTOCELE WITH RECTOCELE: Primary | ICD-10-CM

## 2024-09-26 DIAGNOSIS — N95.2 ATROPHIC VAGINITIS: ICD-10-CM

## 2024-09-26 PROCEDURE — 99999 PR PBB SHADOW E&M-EST. PATIENT-LVL III: CPT | Mod: PBBFAC,,, | Performed by: OBSTETRICS & GYNECOLOGY

## 2024-09-26 PROCEDURE — 99213 OFFICE O/P EST LOW 20 MIN: CPT | Mod: S$PBB,,, | Performed by: OBSTETRICS & GYNECOLOGY

## 2024-09-26 PROCEDURE — 99213 OFFICE O/P EST LOW 20 MIN: CPT | Mod: PBBFAC | Performed by: OBSTETRICS & GYNECOLOGY

## 2024-09-27 LAB — ACE SERPL-CCNC: 38 U/L (ref 16–85)

## 2024-10-01 ENCOUNTER — CLINICAL SUPPORT (OUTPATIENT)
Dept: FAMILY MEDICINE | Facility: CLINIC | Age: 79
End: 2024-10-01
Payer: MEDICARE

## 2024-10-01 VITALS — TEMPERATURE: 99 F

## 2024-10-01 DIAGNOSIS — Z23 NEED FOR PNEUMOCOCCAL 20-VALENT CONJUGATE VACCINATION: Primary | ICD-10-CM

## 2024-10-01 PROCEDURE — 90677 PCV20 VACCINE IM: CPT | Mod: ,,, | Performed by: FAMILY MEDICINE

## 2024-10-01 PROCEDURE — G0009 ADMIN PNEUMOCOCCAL VACCINE: HCPCS | Mod: ,,, | Performed by: FAMILY MEDICINE

## 2024-10-02 ENCOUNTER — TELEPHONE (OUTPATIENT)
Dept: FAMILY MEDICINE | Facility: CLINIC | Age: 79
End: 2024-10-02
Payer: MEDICARE

## 2024-10-05 ENCOUNTER — OFFICE VISIT (OUTPATIENT)
Dept: FAMILY MEDICINE | Facility: CLINIC | Age: 79
End: 2024-10-05
Payer: MEDICARE

## 2024-10-05 VITALS
TEMPERATURE: 99 F | RESPIRATION RATE: 18 BRPM | WEIGHT: 140 LBS | SYSTOLIC BLOOD PRESSURE: 138 MMHG | HEIGHT: 62 IN | HEART RATE: 80 BPM | BODY MASS INDEX: 25.76 KG/M2 | OXYGEN SATURATION: 98 % | DIASTOLIC BLOOD PRESSURE: 86 MMHG

## 2024-10-05 DIAGNOSIS — J02.9 SORE THROAT: ICD-10-CM

## 2024-10-05 DIAGNOSIS — U07.1 COVID-19 VIRUS DETECTED: ICD-10-CM

## 2024-10-05 DIAGNOSIS — U07.1 COVID: Primary | ICD-10-CM

## 2024-10-05 DIAGNOSIS — Z20.828 EXPOSURE TO VIRAL DISEASE: ICD-10-CM

## 2024-10-05 PROBLEM — Z80.0 FAMILY HISTORY OF COLON CANCER: Status: ACTIVE | Noted: 2024-10-01

## 2024-10-05 PROBLEM — Z86.0100 HX OF COLONIC POLYPS: Status: ACTIVE | Noted: 2024-10-01

## 2024-10-05 PROBLEM — R15.9 FECAL INCONTINENCE: Status: ACTIVE | Noted: 2024-10-01

## 2024-10-05 PROBLEM — Z80.7 FAMILY HISTORY OF LYMPHOMA: Status: ACTIVE | Noted: 2024-10-01

## 2024-10-05 PROBLEM — K57.90 DIVERTICULOSIS: Status: ACTIVE | Noted: 2024-10-01

## 2024-10-05 PROBLEM — K52.9 CHRONIC DIARRHEA OF UNKNOWN ORIGIN: Status: ACTIVE | Noted: 2023-06-10

## 2024-10-05 LAB
CTP QC/QA: YES
MOLECULAR STREP A: NEGATIVE
POC MOLECULAR INFLUENZA A AGN: NEGATIVE
POC MOLECULAR INFLUENZA B AGN: NEGATIVE
SARS-COV-2 RDRP RESP QL NAA+PROBE: POSITIVE

## 2024-10-05 PROCEDURE — 87502 INFLUENZA DNA AMP PROBE: CPT | Mod: RHCUB | Performed by: NURSE PRACTITIONER

## 2024-10-05 PROCEDURE — 87651 STREP A DNA AMP PROBE: CPT | Mod: RHCUB | Performed by: NURSE PRACTITIONER

## 2024-10-05 PROCEDURE — 87635 SARS-COV-2 COVID-19 AMP PRB: CPT | Mod: RHCUB | Performed by: NURSE PRACTITIONER

## 2024-10-05 PROCEDURE — 99214 OFFICE O/P EST MOD 30 MIN: CPT | Mod: ,,, | Performed by: NURSE PRACTITIONER

## 2024-10-05 RX ORDER — NIRMATRELVIR AND RITONAVIR 300-100 MG
KIT ORAL
Qty: 30 TABLET | Refills: 0 | Status: SHIPPED | OUTPATIENT
Start: 2024-10-05

## 2024-10-05 NOTE — PROGRESS NOTES
"Subjective:       Patient ID: Jennifer Hale is a 79 y.o. female.    Chief Complaint: Sore Throat (Patient presents with a sore throat and cough that started 2 days ago. ), Cough, Nasal Congestion, Chills, and Otalgia    Presents to clinic as above.  No fever. No SOB. No N/V/D.    Sore Throat   Associated symptoms include congestion, coughing, ear pain and headaches.   Cough  Associated symptoms include ear pain, headaches and a sore throat.   Otalgia   Associated symptoms include coughing, headaches and a sore throat.     Review of Systems   Constitutional: Negative.    HENT:  Positive for congestion, ear pain and sore throat.    Respiratory:  Positive for cough.    Cardiovascular: Negative.    Gastrointestinal: Negative.    Musculoskeletal: Negative.    Neurological:  Positive for headaches.          Reviewed family, medical, surgical, and social history.    Objective:      /86 (BP Location: Right arm, Patient Position: Sitting)   Pulse 80   Temp 98.5 °F (36.9 °C) (Oral)   Resp 18   Ht 5' 2" (1.575 m)   Wt 63.5 kg (140 lb)   SpO2 98%   BMI 25.61 kg/m²   Physical Exam  Vitals and nursing note reviewed.   Constitutional:       General: She is not in acute distress.     Appearance: Normal appearance. She is not ill-appearing, toxic-appearing or diaphoretic.   HENT:      Head: Normocephalic.      Mouth/Throat:      Mouth: Mucous membranes are moist.   Cardiovascular:      Rate and Rhythm: Normal rate and regular rhythm.      Heart sounds: Normal heart sounds.   Pulmonary:      Effort: Pulmonary effort is normal.      Breath sounds: Normal breath sounds.   Musculoskeletal:      Cervical back: Normal range of motion and neck supple.   Skin:     General: Skin is warm and dry.      Capillary Refill: Capillary refill takes less than 2 seconds.   Neurological:      Mental Status: She is alert and oriented to person, place, and time.   Psychiatric:         Mood and Affect: Mood normal.         Behavior: Behavior " normal.         Thought Content: Thought content normal.         Judgment: Judgment normal.            Office Visit on 10/05/2024   Component Date Value Ref Range Status    POC Molecular Influenza A Ag 10/05/2024 Negative  Negative Final    POC Molecular Influenza B Ag 10/05/2024 Negative  Negative Final     Acceptable 10/05/2024 Yes   Final    POC Rapid COVID 10/05/2024 Positive (A)  Negative Final     Acceptable 10/05/2024 Yes   Final    Molecular Strep A, POC 10/05/2024 Negative  Negative Final     Acceptable 10/05/2024 Yes   Final      Assessment:       1. COVID    2. Sore throat    3. Exposure to viral disease        Plan:       COVID  -     nirmatrelvir-ritonavir (PAXLOVID) 300 mg (150 mg x 2)-100 mg copackaged tablets (EUA); Take 3 tablets by mouth 2 (two) times daily. Each dose contains 2 nirmatrelvir (pink tablets) and 1 ritonavir (white tablet). Take all 3 tablets together. Hold lipitor (atorvastatin)  while on this pack. You may restart it 5 days after completing pack.  Dispense: 30 tablet; Refill: 0    Sore throat  -     POCT Strep A, Molecular    Exposure to viral disease  -     POCT Influenza A/B Molecular  -     POCT COVID-19 Rapid Screening    Quarantine for 5-7 days  OTC meds for symptoms  Offered Paxlovid. Discussed Risks and benefits.  Drink plenty of fluids  Go to ER with any difficulty breathing or worsening          Risks, benefits, and side effects were discussed with the patient. All questions were answered to the fullest satisfaction of the patient, and pt verbalized understanding and agreement to treatment plan. Pt was to call with any new or worsening symptoms, or present to the ER.

## 2024-10-05 NOTE — PATIENT INSTRUCTIONS
Quarantine for 5-7 days  OTC meds for symptoms  Offered Paxlovid. Discussed Risks and benefits.  Drink plenty of fluids  Go to ER with any difficulty breathing or worsening

## 2024-10-12 ENCOUNTER — NURSE TRIAGE (OUTPATIENT)
Dept: ADMINISTRATIVE | Facility: CLINIC | Age: 79
End: 2024-10-12

## 2024-10-13 NOTE — TELEPHONE ENCOUNTER
Attempt x3 to contact pt on contact number listed in chart after COVID symptom monitoring reply. Voice message left for pt. Care advice no contact.   Reason for Disposition   Third attempt to contact caller AND no contact made. Phone number verified.    Protocols used: No Contact or Duplicate Contact Call-A-

## 2024-11-06 ENCOUNTER — HOSPITAL ENCOUNTER (EMERGENCY)
Facility: HOSPITAL | Age: 79
Discharge: HOME | End: 2024-11-06
Payer: MEDICARE

## 2024-11-06 VITALS
BODY MASS INDEX: 25.58 KG/M2 | TEMPERATURE: 98 F | SYSTOLIC BLOOD PRESSURE: 159 MMHG | RESPIRATION RATE: 17 BRPM | DIASTOLIC BLOOD PRESSURE: 82 MMHG | HEIGHT: 62 IN | OXYGEN SATURATION: 98 % | HEART RATE: 70 BPM | WEIGHT: 139 LBS

## 2024-11-06 DIAGNOSIS — K57.32 SIGMOID DIVERTICULITIS: Primary | ICD-10-CM

## 2024-11-06 LAB
ALBUMIN SERPL BCP-MCNC: 3.4 G/DL (ref 3.5–5)
ALBUMIN/GLOB SERPL: 1 {RATIO}
ALP SERPL-CCNC: 90 U/L (ref 55–142)
ALT SERPL W P-5'-P-CCNC: 21 U/L (ref 13–56)
ANION GAP SERPL CALCULATED.3IONS-SCNC: 9 MMOL/L (ref 7–16)
AST SERPL W P-5'-P-CCNC: 22 U/L (ref 15–37)
BASOPHILS # BLD AUTO: 0.07 K/UL (ref 0–0.2)
BASOPHILS NFR BLD AUTO: 0.9 % (ref 0–1)
BILIRUB SERPL-MCNC: 0.3 MG/DL (ref ?–1.2)
BUN SERPL-MCNC: 14 MG/DL (ref 7–18)
BUN/CREAT SERPL: 18 (ref 6–20)
CALCIUM SERPL-MCNC: 9.4 MG/DL (ref 8.5–10.1)
CHLORIDE SERPL-SCNC: 101 MMOL/L (ref 98–107)
CO2 SERPL-SCNC: 30 MMOL/L (ref 21–32)
CREAT SERPL-MCNC: 0.8 MG/DL (ref 0.55–1.02)
DIFFERENTIAL METHOD BLD: ABNORMAL
EGFR (NO RACE VARIABLE) (RUSH/TITUS): 75 ML/MIN/1.73M2
EOSINOPHIL # BLD AUTO: 0.06 K/UL (ref 0–0.5)
EOSINOPHIL NFR BLD AUTO: 0.7 % (ref 1–4)
ERYTHROCYTE [DISTWIDTH] IN BLOOD BY AUTOMATED COUNT: 13.1 % (ref 11.5–14.5)
GLOBULIN SER-MCNC: 3.5 G/DL (ref 2–4)
GLUCOSE SERPL-MCNC: 90 MG/DL (ref 74–106)
HCT VFR BLD AUTO: 40.2 % (ref 38–47)
HGB BLD-MCNC: 12.9 G/DL (ref 12–16)
IMM GRANULOCYTES # BLD AUTO: 0.03 K/UL (ref 0–0.04)
IMM GRANULOCYTES NFR BLD: 0.4 % (ref 0–0.4)
LIPASE SERPL-CCNC: 40 U/L (ref 16–77)
LYMPHOCYTES # BLD AUTO: 2.01 K/UL (ref 1–4.8)
LYMPHOCYTES NFR BLD AUTO: 25 % (ref 27–41)
MCH RBC QN AUTO: 28.6 PG (ref 27–31)
MCHC RBC AUTO-ENTMCNC: 32.1 G/DL (ref 32–36)
MCV RBC AUTO: 89.1 FL (ref 80–96)
MONOCYTES # BLD AUTO: 0.59 K/UL (ref 0–0.8)
MONOCYTES NFR BLD AUTO: 7.3 % (ref 2–6)
MPC BLD CALC-MCNC: 9.3 FL (ref 9.4–12.4)
NEUTROPHILS # BLD AUTO: 5.29 K/UL (ref 1.8–7.7)
NEUTROPHILS NFR BLD AUTO: 65.7 % (ref 53–65)
NRBC # BLD AUTO: 0 X10E3/UL
NRBC, AUTO (.00): 0 %
PLATELET # BLD AUTO: 316 K/UL (ref 150–400)
POTASSIUM SERPL-SCNC: 3.3 MMOL/L (ref 3.5–5.1)
PROT SERPL-MCNC: 6.9 G/DL (ref 6.4–8.2)
RBC # BLD AUTO: 4.51 M/UL (ref 4.2–5.4)
SODIUM SERPL-SCNC: 137 MMOL/L (ref 136–145)
WBC # BLD AUTO: 8.05 K/UL (ref 4.5–11)

## 2024-11-06 PROCEDURE — 25000003 PHARM REV CODE 250: Performed by: NURSE PRACTITIONER

## 2024-11-06 PROCEDURE — 25500020 PHARM REV CODE 255: Performed by: NURSE PRACTITIONER

## 2024-11-06 PROCEDURE — 99285 EMERGENCY DEPT VISIT HI MDM: CPT | Mod: 25

## 2024-11-06 PROCEDURE — 85025 COMPLETE CBC W/AUTO DIFF WBC: CPT | Performed by: NURSE PRACTITIONER

## 2024-11-06 PROCEDURE — 80053 COMPREHEN METABOLIC PANEL: CPT | Performed by: NURSE PRACTITIONER

## 2024-11-06 PROCEDURE — 83690 ASSAY OF LIPASE: CPT | Performed by: NURSE PRACTITIONER

## 2024-11-06 PROCEDURE — 36415 COLL VENOUS BLD VENIPUNCTURE: CPT | Performed by: NURSE PRACTITIONER

## 2024-11-06 RX ORDER — IOPAMIDOL 755 MG/ML
100 INJECTION, SOLUTION INTRAVASCULAR
Status: COMPLETED | OUTPATIENT
Start: 2024-11-06 | End: 2024-11-06

## 2024-11-06 RX ORDER — CIPROFLOXACIN 500 MG/1
500 TABLET ORAL 2 TIMES DAILY
Qty: 20 TABLET | Refills: 0 | Status: SHIPPED | OUTPATIENT
Start: 2024-11-06 | End: 2024-11-16

## 2024-11-06 RX ORDER — METRONIDAZOLE 500 MG/1
500 TABLET ORAL EVERY 12 HOURS
Qty: 14 TABLET | Refills: 0 | Status: SHIPPED | OUTPATIENT
Start: 2024-11-06 | End: 2024-11-13

## 2024-11-06 RX ORDER — POTASSIUM CHLORIDE 20 MEQ/1
40 TABLET, EXTENDED RELEASE ORAL
Status: COMPLETED | OUTPATIENT
Start: 2024-11-06 | End: 2024-11-06

## 2024-11-06 RX ADMIN — IOPAMIDOL 100 ML: 755 INJECTION, SOLUTION INTRAVENOUS at 04:11

## 2024-11-06 RX ADMIN — POTASSIUM CHLORIDE 40 MEQ: 1500 TABLET, EXTENDED RELEASE ORAL at 04:11

## 2024-11-06 NOTE — DISCHARGE INSTRUCTIONS
Follow up with PCP in 48-72 hours. Return to ED if any new or worsening of symptoms occur. Notify Dr. Wagner's office of ED visit to see if they can do an earlier follow up with you or earlier colonoscopy.

## 2024-11-06 NOTE — ED PROVIDER NOTES
Encounter Date: 11/6/2024       History     Chief Complaint   Patient presents with    Abdominal Pain     Lower abd pain started this morning.  BM are very small, soft.  Pt states she took some Miralax this morning       79 year old female presents to ED with complaint of abdominal pain. Patient states she had gallbladder surgery 2 years ago and for the last 1 year has developed pain to stomach and irregular bowel movements. She states she initially had normal stools on a routine basis, but now she has mix constipation and diarrhea and has noted that her stools are flat, thin, and small in amount. She reports she has a scheduled colonoscopy in December with Dr. Wagner .     The history is provided by the patient.     Review of patient's allergies indicates:   Allergen Reactions    Sulfa (sulfonamide antibiotics) Hives     Past Medical History:   Diagnosis Date    Abdominal hernia     Basal cell carcinoma     Cardiac murmur     GERD (gastroesophageal reflux disease)     Hyperlipidemia     Hypertension      Past Surgical History:   Procedure Laterality Date    basil cell carcinoma      left cheek    DILATION AND CURETTAGE OF UTERUS      HYSTERECTOMY      LAPAROSCOPIC CHOLECYSTECTOMY N/A 05/26/2022    Procedure: CHOLECYSTECTOMY, LAPAROSCOPIC;  Surgeon: Gil Monique DO;  Location: [facility];  Service: General;  Laterality: N/A;    TEAR DUCT SURGERY      right eye    TOTAL ABDOMINAL HYSTERECTOMY W/ BILATERAL SALPINGOOPHORECTOMY  01/18/2000     Family History   Problem Relation Name Age of Onset    Breast cancer Mother      Lymphoma Mother      Hypertension Mother      Lymphoma Father      Breast cancer Sister      Lymphoma Sister      Colon cancer Maternal Grandfather      Lymphoma Brother      Breast cancer Maternal Cousin      Breast cancer Maternal Cousin      Breast cancer Maternal Cousin       Social History     Tobacco Use    Smoking status: Never     Passive exposure: Past    Smokeless tobacco: Never    Substance Use Topics    Alcohol use: Yes     Comment: only occas    Drug use: Never     Review of Systems   Constitutional:  Negative for chills and fever.   HENT:  Negative for sinus pressure and sinus pain.    Eyes:  Negative for photophobia and visual disturbance.   Respiratory:  Negative for cough and shortness of breath.    Cardiovascular:  Negative for chest pain and palpitations.   Gastrointestinal:  Positive for abdominal pain. Negative for nausea and vomiting.   Genitourinary:  Negative for dysuria and urgency.   Musculoskeletal:  Negative for arthralgias and gait problem.   Skin:  Negative for color change and wound.   Neurological:  Negative for dizziness and weakness.   Hematological:  Negative for adenopathy. Does not bruise/bleed easily.   Psychiatric/Behavioral:  Negative for agitation and confusion.    All other systems reviewed and are negative.      Physical Exam     Initial Vitals [11/06/24 1146]   BP Pulse Resp Temp SpO2   (!) 159/82 70 17 98.1 °F (36.7 °C) 98 %      MAP       --         Physical Exam    Nursing note and vitals reviewed.  Constitutional: She appears well-developed and well-nourished.   HENT:   Head: Normocephalic and atraumatic.   Eyes: EOM are normal. Pupils are equal, round, and reactive to light.   Neck: Neck supple.   Normal range of motion.  Cardiovascular:  Normal rate and regular rhythm.           No murmur heard.  Pulmonary/Chest: She has no wheezes. She has no rhonchi.   Abdominal: Abdomen is soft. She exhibits no distension. There is abdominal tenderness in the suprapubic area.   Musculoskeletal:         General: No tenderness or edema.      Cervical back: Normal range of motion and neck supple.     Lymphadenopathy:     She has no cervical adenopathy.   Neurological: She is alert and oriented to person, place, and time. No cranial nerve deficit or sensory deficit.   Skin: Skin is warm and dry. Capillary refill takes less than 2 seconds.   Psychiatric: She has a normal  mood and affect. Thought content normal.         Medical Screening Exam   See Full Note    ED Course   Procedures  Labs Reviewed   COMPREHENSIVE METABOLIC PANEL - Abnormal       Result Value    Sodium 137      Potassium 3.3 (*)     Chloride 101      CO2 30      Anion Gap 9      Glucose 90      BUN 14      Creatinine 0.80      BUN/Creatinine Ratio 18      Calcium 9.4      Total Protein 6.9      Albumin 3.4 (*)     Globulin 3.5      A/G Ratio 1.0      Bilirubin, Total 0.3      Alk Phos 90      ALT 21      AST 22      eGFR 75     CBC WITH DIFFERENTIAL - Abnormal    WBC 8.05      RBC 4.51      Hemoglobin 12.9      Hematocrit 40.2      MCV 89.1      MCH 28.6      MCHC 32.1      RDW 13.1      Platelet Count 316      MPV 9.3 (*)     Neutrophils % 65.7 (*)     Lymphocytes % 25.0 (*)     Monocytes % 7.3 (*)     Eosinophils % 0.7 (*)     Basophils % 0.9      Immature Granulocytes % 0.4      nRBC, Auto 0.0      Neutrophils, Abs 5.29      Lymphocytes, Absolute 2.01      Monocytes, Absolute 0.59      Eosinophils, Absolute 0.06      Basophils, Absolute 0.07      Immature Granulocytes, Absolute 0.03      nRBC, Absolute 0.00      Diff Type Auto     LIPASE - Normal    Lipase 40     CBC W/ AUTO DIFFERENTIAL    Narrative:     The following orders were created for panel order CBC W/ AUTO DIFFERENTIAL.  Procedure                               Abnormality         Status                     ---------                               -----------         ------                     CBC with Differential[[id number]]       Abnormal            Final result                 Please view results for these tests on the individual orders.          Imaging Results              CT Abdomen Pelvis With IV Contrast NO Oral Contrast (Final result)  Result time 11/06/24 16:49:56      Final result by Mazin Huynh MD (11/06/24 16:49:56)                   Impression:      Diverticulosis coli with minimal inflammatory changes in the sigmoid mesocolon, suggestive  acute sigmoid diverticulitis.  No drainable collection.    Unchanged hypodensities in the liver.    Changes of prior hysterectomy and cholecystectomy.    Additional findings as above.      Electronically signed by: Mazin Huynh MD  Date:    11/06/2024  Time:    16:49               Narrative:    EXAMINATION:  CT ABDOMEN PELVIS WITH IV CONTRAST    CLINICAL HISTORY:  Abdominal pain, acute, nonlocalized;    TECHNIQUE:  Low dose axial images, sagittal and coronal reformations were obtained from the lung bases to the pubic symphysis following the IV administration of 100 mL of Isovue-370 .  Oral contrast was not given.    COMPARISON:  Abdomen x-ray dated 11/06/2024.    CT scan of the abdomen pelvis dated 02/08/2022.    FINDINGS:  There are no pleural effusions.  There is no evidence of a pneumothorax.  No airspace opacity is present.  No pulmonary nodules identified.    The heart is unremarkable.  There is normal tapering of the abdominal aorta.  There are both calcified noncalcified plaque along the course of the abdominal aorta.  The celiac trunk, SMA, and GRACIA are within normal limits.  The portal veins and mesenteric veins are unremarkable.  The IVC and the remainder of the venous structures are within normal limits.    There is no evidence of lymphadenopathy in the abdomen or pelvis.    The esophagus, stomach, and duodenum are within normal limits.  The small bowel loops are unremarkable.  There is colonic diverticula.  There are minimal inflammatory changes involving the mesocolon of the sigmoid colon.  There is no CT evidence of obstruction.    There are unchanged hypodensities in the liver.  The liver is otherwise unremarkable.  The patient is status post cholecystectomy.  The biliary tree is within normal limits.  The spleen is unremarkable.  The pancreas is within normal limits.  The adrenal glands are unremarkable.    There are subcentimeter hypodensities in both kidneys.  These are too small complete  characterization.  The ureters are unremarkable.  The urinary bladder is within normal limits.  The patient appears status post hysterectomy.    There is no evidence of free fluid in the abdomen or pelvis.  There is no evidence of free air.  There is no evidence of pneumatosis.  No portal venous air is identified.    The psoas margins are unremarkable.  The abdominal wall is within normal limits.  There are degenerative changes in the osseous structures.  There is no evidence of a fracture.                                       X-Ray Abdomen Flat And Erect (Final result)  Result time 11/06/24 13:43:45      Final result by Gil Glover MD (11/06/24 13:43:45)                   Impression:      1. Nonobstructive bowel gas pattern noting possible developing constipation.      Electronically signed by: Gil Glover MD  Date:    11/06/2024  Time:    13:43               Narrative:    EXAMINATION:  XR ABDOMEN FLAT AND ERECT    CLINICAL HISTORY:  Abdominal Pain;    TECHNIQUE:  Flat and erect AP views of the abdomen were performed.    COMPARISON:  None    FINDINGS:  One upright view, 1 supine view of the abdomen.    No significant air-fluid levels on upright view.  Air and stool is seen within the large bowel and projected over the rectum noting moderate stool throughout the colon.  There are no calcifications to convincingly suggest nephrolithiasis.  Surgical change projects over the right upper quadrant.  The osseous structures are intact noting degenerative change.  No large volume free air or pneumatosis.  The lower lung zones are clear.                                       Medications   potassium chloride SA CR tablet 40 mEq (40 mEq Oral Given 11/6/24 1652)   iopamidoL (ISOVUE-370) injection 100 mL (100 mLs Intravenous Given 11/6/24 1612)     Medical Decision Making   79 year old female presents to ED with complaint of abdominal pain. Patient states she had gallbladder surgery 2 years ago and for the last 1  year has developed pain to stomach and irregular bowel movements. She states she initially had normal stools on a routine basis, but now she has mix constipation and diarrhea and has noted that her stools are flat, thin, and small in amount. She reports she has a scheduled colonoscopy in December with Dr. Wagner .     Labs, diagnostics obtained/reviewed  Prescriptions provided      Amount and/or Complexity of Data Reviewed  Independent Historian:      Details: Patient reports having CT of abdomen pelvis in May with noted enlarged lymph nodes that were not large enough to have biopsied.  Patient reports having CT scheduled for December.  Labs: ordered. Decision-making details documented in ED Course.  Radiology: ordered.     Details: Nonobstructive bowel gas pattern    Diverticulosis coli with minimal inflammatory changes in the sigmoid mesocolon, suggestive acute sigmoid diverticulitis.  No drainable collectio    Risk  Prescription drug management.                                      Clinical Impression:   Final diagnoses:  [K57.32] Sigmoid diverticulitis (Primary)        ED Disposition Condition    Discharge Stable          ED Prescriptions       Medication Sig Dispense Start Date End Date Auth. Provider    metroNIDAZOLE (FLAGYL) 500 MG tablet Take 1 tablet (500 mg total) by mouth every 12 (twelve) hours. for 7 days 14 tablet 11/6/2024 11/13/2024 Kaylyn Miller FNP    ciprofloxacin HCl (CIPRO) 500 MG tablet Take 1 tablet (500 mg total) by mouth 2 (two) times daily. for 10 days 20 tablet 11/6/2024 11/16/2024 Kaylyn Miller FNP          Follow-up Information    None          Kaylyn Miller FNP  11/06/24 7930

## 2024-11-12 ENCOUNTER — OFFICE VISIT (OUTPATIENT)
Dept: VASCULAR SURGERY | Facility: CLINIC | Age: 79
End: 2024-11-12
Payer: MEDICARE

## 2024-11-12 VITALS
DIASTOLIC BLOOD PRESSURE: 64 MMHG | WEIGHT: 132.38 LBS | HEIGHT: 62 IN | BODY MASS INDEX: 24.36 KG/M2 | SYSTOLIC BLOOD PRESSURE: 117 MMHG | RESPIRATION RATE: 14 BRPM | HEART RATE: 71 BPM

## 2024-11-12 DIAGNOSIS — M79.604 LEG PAIN, BILATERAL: ICD-10-CM

## 2024-11-12 DIAGNOSIS — I83.93 VARICOSE VEINS OF BOTH LOWER EXTREMITIES, UNSPECIFIED WHETHER COMPLICATED: Primary | ICD-10-CM

## 2024-11-12 DIAGNOSIS — I87.2 VENOUS INSUFFICIENCY: ICD-10-CM

## 2024-11-12 DIAGNOSIS — R25.2 LEG CRAMPING: ICD-10-CM

## 2024-11-12 DIAGNOSIS — M79.605 LEG PAIN, BILATERAL: ICD-10-CM

## 2024-11-12 PROCEDURE — 99214 OFFICE O/P EST MOD 30 MIN: CPT | Mod: S$PBB,,, | Performed by: FAMILY MEDICINE

## 2024-11-12 PROCEDURE — 99215 OFFICE O/P EST HI 40 MIN: CPT | Mod: PBBFAC | Performed by: FAMILY MEDICINE

## 2024-11-12 PROCEDURE — 99999 PR PBB SHADOW E&M-EST. PATIENT-LVL V: CPT | Mod: PBBFAC,,, | Performed by: FAMILY MEDICINE

## 2024-11-12 RX ORDER — POLYETHYLENE GLYCOL-3350 AND ELECTROLYTES 236; 6.74; 5.86; 2.97; 22.74 G/274.31G; G/274.31G; G/274.31G; G/274.31G; G/274.31G
POWDER, FOR SOLUTION ORAL
COMMUNITY
Start: 2024-11-08

## 2024-11-12 NOTE — PROGRESS NOTES
[facility] CLINIC NOTE    Patient ID: Jennifer Hale is a 79 y.o. female.    I. HISTORY     Chief Complaint:   Chief Complaint   Patient presents with    Follow-up     Exam room 3.  3M comp        HPI: Jennifer Hale is a 79 y.o. female who presents today following 3 months of conservative therapy for chronic venous insufficiency consisting of 15-20 millimeter of mercury compression stockings leg elevation and calf exercises.  The patient states that these measures have helped to improve her lower extremity swelling, pain and cramping.  She feels as though she is doing much better at this time and would like to continue with conservative measures in lieu of any correctional procedures.    Clinical summary:  The patient was initially seen on 08/27/2024 Dr. Lui for evaluation of lower extremity discomfort, cramping and varicose veins.  Symptoms are progressive/worsening and began greater than 6 months ago.  Location is bilateral lower extremities below the knees, worse on the right. Symptoms are worse at the end of the day.  History of venous interventions includes none.  Denies family history of venous disease.  Denies history of DVT or cellulitis.    Bilateral complete venous reflux study performed 09/03/2024 shows no evidence of DVT bilaterally.  The study also shows dilation and proximal reflux of the bilateral great saphenous veins.  No reflux noted in the bilateral small saphenous veins.    Venous Disease Medical Necessity Documentation Initial Visit Date:  08/27/2024 Return Check Date: 11/12/2024   Have you ever had a rupture or bleed from a varicose vein in your leg(s)?              [] Yes  [x] No   [] Yes   [x] No   Have you ever been diagnosed with phlebitis, cellulitis, or inflammation in the area of the varicose veins of  your leg(s)?  [] Yes  [x] No    [] Yes   [x] No   Do you have darkened or inflamed skin on your legs?   [x] Yes   [] No   [x] Yes   [] No   Do you have leg swelling?     [] Yes   [x]  No   [] Yes   [x] No   Do you have leg pain?   [x] Yes   [] No   [] Yes   [] No   If yes, describe the type of pain?    []   Stabbing []  Radiating [x]  Aching   []  Tightness []  Throbbing               []  Burning []  Cramping              Do you have leg discomfort?   [x] Yes   [] No   [x] Yes   [] No   If yes, describe the type of discomfort?    []  Heaviness []  Fullness   [x]  Restlessness [] Tired/Fatigued [] Itching          Occassions at night    Have you ever worn compression hose?   [] Yes   [x] No  Yes   [] No   If yes, how long?        3 months   Do you elevate your legs while sitting?   [x] Yes   [] No   [x] Yes   [] No   Does venous disease (varicose veins, ulcers, skin changes, leg pain/swelling) interfere with your daily life?  If yes, check activities you are limited or unable to do.    []  Shower  []   Walk  []  Exercise  [] Play with children/grandchildren  []  Shop [] Work [] Stand for any period of time [] Sleep                               [] Sitting for an extended period of time.           [] Yes   [x] No   [] Yes   [x] No   Do you exercise/have you tried to exercise (i.e.  Walk our participate in a regular exercise routine)?  [x] Yes  [] No   [x] Yes   [] No   BMI   24.95 24.2          Past Medical History:   Diagnosis Date    Abdominal hernia     Basal cell carcinoma     Cardiac murmur     GERD (gastroesophageal reflux disease)     Hyperlipidemia     Hypertension         Past Surgical History:   Procedure Laterality Date    basil cell carcinoma      left cheek    DILATION AND CURETTAGE OF UTERUS      HYSTERECTOMY      LAPAROSCOPIC CHOLECYSTECTOMY N/A 05/26/2022    Procedure: CHOLECYSTECTOMY, LAPAROSCOPIC;  Surgeon: Gil Monique DO;  Location: [facility];  Service: General;  Laterality: N/A;    TEAR DUCT SURGERY      right eye    TOTAL ABDOMINAL HYSTERECTOMY W/ BILATERAL SALPINGOOPHORECTOMY  01/18/2000       Social History     Tobacco Use   Smoking Status Never    Passive exposure:  Past   Smokeless Tobacco Never         Current Outpatient Medications:     ascorbic acid, vitamin C, (VITAMIN C) 500 MG tablet, Vitamin C 500 MG Oral Tablet QTY: 0 tablet Days: 0 Refills: 0  Written: 05/11/22 Patient Instructions:, Disp: , Rfl:     atorvastatin (LIPITOR) 10 MG tablet, Take 1 tablet (10 mg total) by mouth once daily., Disp: 90 tablet, Rfl: 3    cholecalciferol, vitamin D3, (VITAMIN D3) 50 mcg (2,000 unit) Cap capsule, Vitamin D3 50 MCG (2000 UT) Oral Capsule QTY: 0 capsule Days: 0 Refills: 0  Written: 05/11/22 Patient Instructions:, Disp: , Rfl:     ciprofloxacin HCl (CIPRO) 500 MG tablet, Take 1 tablet (500 mg total) by mouth 2 (two) times daily. for 10 days, Disp: 20 tablet, Rfl: 0    DOCOSAHEXAENOIC ACID ORAL, Take 500 mg by mouth., Disp: , Rfl:     estradioL (IMVEXXY MAINTENANCE PACK) 4 mcg Inst, Place 1 suppository vaginally twice a week. At bedtime, Disp: 8 each, Rfl: 11    fluticasone propionate (FLONASE) 50 mcg/actuation nasal spray, 1 spray (50 mcg total) by Each Nostril route once daily., Disp: 48 g, Rfl: 3    GAVILYTE-G 236-22.74-6.74 -5.86 gram suspension, TAKE 4L BY MOUTH ONCE FOR 1 DOSE, Disp: , Rfl:     metroNIDAZOLE (FLAGYL) 500 MG tablet, Take 1 tablet (500 mg total) by mouth every 12 (twelve) hours. for 7 days, Disp: 14 tablet, Rfl: 0    multivitamin capsule, Take 1 capsule by mouth once daily., Disp: , Rfl:     nirmatrelvir-ritonavir (PAXLOVID) 300 mg (150 mg x 2)-100 mg copackaged tablets (EUA), Take 3 tablets by mouth 2 (two) times daily. Each dose contains 2 nirmatrelvir (pink tablets) and 1 ritonavir (white tablet). Take all 3 tablets together. Hold lipitor (atorvastatin)  while on this pack. You may restart it 5 days after completing pack., Disp: 30 tablet, Rfl: 0    omega 3-dha-epa-fish oil (FISH OIL) 60- mg Cap, 1 capsule., Disp: , Rfl:     triamcinolone acetonide 0.025% (KENALOG) 0.025 % Oint, Apply topically 2 (two) times daily as needed (soreness itching to scalp  wound from wasp sting)., Disp: 80 g, Rfl: 0    triamterene-hydrochlorothiazide 37.5-25 mg (MAXZIDE-25) 37.5-25 mg per tablet, Take 1 tablet by mouth once daily., Disp: 90 tablet, Rfl: 1    Review of Systems   Constitutional:  Negative for activity change, chills, diaphoresis, fatigue and fever.   Respiratory:  Negative for cough and shortness of breath.    Cardiovascular:  Negative for chest pain and claudication.        Hyperpigmentation LE   Gastrointestinal:  Negative for nausea and vomiting.   Musculoskeletal:  Positive for leg pain. Negative for joint swelling.   Integumentary:  Negative for rash and wound.   Neurological:  Negative for weakness and numbness.          II. PHYSICAL EXAM     Physical Exam  Constitutional:       General: She is awake. She is not in acute distress.     Appearance: Normal appearance. She is not ill-appearing or toxic-appearing.   HENT:      Head: Normocephalic and atraumatic.   Eyes:      Extraocular Movements: Extraocular movements intact.      Conjunctiva/sclera: Conjunctivae normal.      Pupils: Pupils are equal, round, and reactive to light.   Neck:      Vascular: No carotid bruit or JVD.   Cardiovascular:      Rate and Rhythm: Normal rate and regular rhythm.      Pulses:           Dorsalis pedis pulses are detected w/ Doppler on the right side and detected w/ Doppler on the left side.        Posterior tibial pulses are detected w/ Doppler on the right side and detected w/ Doppler on the left side.      Heart sounds: No murmur heard.  Pulmonary:      Effort: Pulmonary effort is normal. No respiratory distress.      Breath sounds: No stridor. No wheezing, rhonchi or rales.   Musculoskeletal:         General: No swelling, tenderness or deformity.      Right lower leg: Edema present.      Left lower leg: No edema.      Comments: No evidence of cellulitis or open ulceration bilaterally.   Feet:      Comments: Triphasic hand-held dopplerable pulses of the bilateral dorsalis pedis and  posterior tibial arteries.  Skin:     General: Skin is warm.      Capillary Refill: Capillary refill takes less than 2 seconds.      Coloration: Skin is not ashen.      Findings: No bruising, erythema, lesion, rash or wound.   Neurological:      Mental Status: She is alert and oriented to person, place, and time.      Motor: No weakness.   Psychiatric:         Speech: Speech normal.         Behavior: Behavior normal. Behavior is cooperative.         Reticular/Spider veins noted:  RLE: anterior calf, medial calf, posterior calf, and lateral calf  LLE: anterior calf, medial calf, posterior calf, and lateral calf    Varicose veins noted:  RLE: medial calf  LLE:  medial calf    CEAP Classification  Clinical Signs: Class 4 - Skin changes ascribed to venous disease  Etiologic Classification: Primary  Anatomic distribution: Superficial  Pathophysiologic dysfunction: Reflux                         Venous Clinical Severity Score  Pain:2=Daily, moderate activity limitation, occasional analgesics  Varicose Veins: 1=Few, scattered. Branch varicose veins  Venous Edema: 1=Evening ankle edema only  Pigmentation: 1=Diffuse, but limited in area and old (brown)  Inflammation: 0=None  Induration: 0=None  Number of Active Ulcers: 0=0  Active Ulceration, Duration: 0=None  Active Ulcer Size: 0=None  Compressive Therapy: 3=Full compliance, stockings + elevation  Total Score: 8       III. ASSESSMENT & PLAN (MEDICAL DECISION MAKING)     1. Varicose veins of both lower extremities, unspecified whether complicated    2. Leg pain, bilateral    3. Leg cramping    4. Venous insufficiency          Assessment/Diagnosis and Plan:  Previous Ultrasound of lower extremities reveals positive evidence of venous insufficiency in the bilateral great saphenous veins.  Plan for conservative medical treatment at this time. The patient may benefit from endovenous ablation in the future.     - continue compression with 15-20 mmHg Rx stockings  - Therapeutic  leg elevation  - Calf pumping exercises  - RTC 3 months for further evaluation          Pelon Nagel, DO

## 2024-11-14 ENCOUNTER — OFFICE VISIT (OUTPATIENT)
Dept: FAMILY MEDICINE | Facility: CLINIC | Age: 79
End: 2024-11-14
Payer: MEDICARE

## 2024-11-14 VITALS
BODY MASS INDEX: 25.4 KG/M2 | WEIGHT: 138 LBS | RESPIRATION RATE: 17 BRPM | TEMPERATURE: 98 F | OXYGEN SATURATION: 99 % | HEART RATE: 75 BPM | DIASTOLIC BLOOD PRESSURE: 80 MMHG | SYSTOLIC BLOOD PRESSURE: 134 MMHG | HEIGHT: 62 IN

## 2024-11-14 DIAGNOSIS — K57.92 DIVERTICULITIS: Primary | ICD-10-CM

## 2024-11-14 DIAGNOSIS — I10 ESSENTIAL HYPERTENSION: ICD-10-CM

## 2024-11-14 PROCEDURE — 99213 OFFICE O/P EST LOW 20 MIN: CPT | Mod: ,,, | Performed by: FAMILY MEDICINE

## 2024-11-14 RX ORDER — COLESTIPOL HYDROCHLORIDE 5 G/5G
GRANULE, FOR SUSPENSION ORAL
COMMUNITY

## 2024-11-14 RX ORDER — TRIAMTERENE AND HYDROCHLOROTHIAZIDE 37.5; 25 MG/1; MG/1
1 CAPSULE ORAL EVERY MORNING
Qty: 90 CAPSULE | Refills: 3 | Status: SHIPPED | OUTPATIENT
Start: 2024-11-14 | End: 2025-11-14

## 2024-11-14 NOTE — LETTER
AUTHORIZATION FOR RELEASE OF   CONFIDENTIAL INFORMATION    Dear ***,    We are seeing Jennifer Hale, date of birth 1945, in the clinic at [facility]. Raghavendra Lui MD is the patient's PCP. Jennifer Hale has an outstanding lab/procedure at the time we reviewed her chart. In order to help keep her health information updated, she has authorized us to request the following medical record(s):        (  )  MAMMOGRAM                                      (  )  COLONOSCOPY      (  )  PAP SMEAR                                          (  )  OUTSIDE LAB RESULTS     (  )  DEXA SCAN                                          (  )  EYE EXAM            (  )  FOOT EXAM                                          (  )  ENTIRE RECORD     (  )  OUTSIDE IMMUNIZATIONS                 (  )  _______________         Please fax records to Raghavendra Lui MD, ***     If you have any questions, please contact *** at [phone].           Patient Name: Jennifer Hale  : 1945  Patient Phone #: [phone]

## 2024-11-15 NOTE — PROGRESS NOTES
Jennifer Hale is a 79 y.o. female seen today for ER follow-up for diverticulitis.  Patient reports abdominal pain is basically resolved and she will continue to finish her course of Cipro.  Blood pressures remain well controlled.    Past Medical History:   Diagnosis Date    Abdominal hernia     Basal cell carcinoma     Cardiac murmur     GERD (gastroesophageal reflux disease)     Hyperlipidemia     Hypertension      Family History   Problem Relation Name Age of Onset    Breast cancer Mother      Lymphoma Mother      Hypertension Mother      Lymphoma Father      Breast cancer Sister      Lymphoma Sister      Colon cancer Maternal Grandfather      Lymphoma Brother      Breast cancer Maternal Cousin      Breast cancer Maternal Cousin      Breast cancer Maternal Cousin       Current Outpatient Medications on File Prior to Visit   Medication Sig Dispense Refill    ascorbic acid, vitamin C, (VITAMIN C) 500 MG tablet Vitamin C 500 MG Oral Tablet QTY: 0 tablet Days: 0 Refills: 0  Written: 05/11/22 Patient Instructions:      atorvastatin (LIPITOR) 10 MG tablet Take 1 tablet (10 mg total) by mouth once daily. 90 tablet 3    cholecalciferol, vitamin D3, (VITAMIN D3) 50 mcg (2,000 unit) Cap capsule Vitamin D3 50 MCG (2000 UT) Oral Capsule QTY: 0 capsule Days: 0 Refills: 0  Written: 05/11/22 Patient Instructions:      ciprofloxacin HCl (CIPRO) 500 MG tablet Take 1 tablet (500 mg total) by mouth 2 (two) times daily. for 10 days 20 tablet 0    colestipoL (COLESTID) 5 gram Pack 1/2 scoop at lunch time      estradioL (IMVEXXY MAINTENANCE PACK) 4 mcg Inst Place 1 suppository vaginally twice a week. At bedtime 8 each 11    fluticasone propionate (FLONASE) 50 mcg/actuation nasal spray 1 spray (50 mcg total) by Each Nostril route once daily. 48 g 3    multivitamin capsule Take 1 capsule by mouth once daily.      omega 3-dha-epa-fish oil (FISH OIL) 60- mg Cap 1 capsule.      triamcinolone acetonide 0.025% (KENALOG) 0.025 % Oint  Apply topically 2 (two) times daily as needed (soreness itching to scalp wound from wasp sting). 80 g 0    DOCOSAHEXAENOIC ACID ORAL Take 500 mg by mouth. (Patient not taking: Reported on 11/14/2024)      GAVILYTE-G 236-22.74-6.74 -5.86 gram suspension TAKE 4L BY MOUTH ONCE FOR 1 DOSE (Patient not taking: Reported on 11/14/2024)      nirmatrelvir-ritonavir (PAXLOVID) 300 mg (150 mg x 2)-100 mg copackaged tablets (EUA) Take 3 tablets by mouth 2 (two) times daily. Each dose contains 2 nirmatrelvir (pink tablets) and 1 ritonavir (white tablet). Take all 3 tablets together. Hold lipitor (atorvastatin)  while on this pack. You may restart it 5 days after completing pack. (Patient not taking: Reported on 11/14/2024) 30 tablet 0    [DISCONTINUED] tolterodine (DETROL LA) 4 MG 24 hr capsule Take 1 capsule (4 mg total) by mouth once daily. (Patient not taking: Reported on 1/12/2022) 30 capsule 2     No current facility-administered medications on file prior to visit.     Immunization History   Administered Date(s) Administered    COVID-19 MRNA, LN-S PF (MODERNA HALF 0.25 ML DOSE) 11/05/2021, 07/01/2022    COVID-19, MRNA, LN-S, PF (MODERNA FULL 0.5 ML DOSE) 03/01/2021    COVID-19, mRNA, LNP-S, bivalent booster, PF (Moderna Omicron)12 + YEARS 12/16/2022    Hepatitis A, Adult 09/28/2022, 03/28/2023    Influenza 10/01/2021, 10/10/2023    Pneumococcal Conjugate - 20 Valent 10/01/2024       Review of Systems   Constitutional:  Negative for fever, malaise/fatigue and weight loss.   Respiratory:  Negative for shortness of breath.    Cardiovascular:  Negative for chest pain and palpitations.   Gastrointestinal:  Positive for abdominal pain. Negative for nausea and vomiting.   Psychiatric/Behavioral:  Negative for depression.         Vitals:    11/14/24 0952   BP: 134/80   Pulse: 75   Resp: 17   Temp: 98 °F (36.7 °C)       Physical Exam  Vitals reviewed.   Constitutional:       Appearance: Normal appearance.   HENT:      Head:  Normocephalic.   Eyes:      Extraocular Movements: Extraocular movements intact.      Conjunctiva/sclera: Conjunctivae normal.      Pupils: Pupils are equal, round, and reactive to light.   Neck:      Thyroid: No thyroid mass or thyromegaly.   Cardiovascular:      Rate and Rhythm: Normal rate and regular rhythm.      Heart sounds: Normal heart sounds. No murmur heard.     No gallop.   Pulmonary:      Effort: Pulmonary effort is normal. No respiratory distress.      Breath sounds: Normal breath sounds. No wheezing or rales.   Abdominal:      General: Bowel sounds are normal.      Palpations: Abdomen is soft.      Tenderness: There is no abdominal tenderness.   Skin:     General: Skin is warm and dry.      Coloration: Skin is not jaundiced or pale.   Neurological:      Mental Status: She is alert.   Psychiatric:         Mood and Affect: Mood normal.         Behavior: Behavior normal.         Thought Content: Thought content normal.         Judgment: Judgment normal.          Assessment and Plan  1. Diverticulitis    2. Essential hypertension  -     triamterene-hydrochlorothiazide 37.5-25 mg (DYAZIDE) 37.5-25 mg per capsule; Take 1 capsule by mouth every morning.  Dispense: 90 capsule; Refill: 3             Return to clinic in 3-6 months or as needed.    Health Maintenance Topics with due status: Not Due       Topic Last Completion Date    DEXA Scan 09/07/2023    Lipid Panel 07/30/2024

## 2024-11-20 DIAGNOSIS — G47.33 OBSTRUCTIVE SLEEP APNEA (ADULT) (PEDIATRIC): Primary | ICD-10-CM

## 2025-01-13 ENCOUNTER — OFFICE VISIT (OUTPATIENT)
Dept: FAMILY MEDICINE | Facility: CLINIC | Age: 80
End: 2025-01-13
Payer: MEDICARE

## 2025-01-13 VITALS
HEART RATE: 67 BPM | SYSTOLIC BLOOD PRESSURE: 129 MMHG | DIASTOLIC BLOOD PRESSURE: 78 MMHG | HEIGHT: 62 IN | TEMPERATURE: 99 F | RESPIRATION RATE: 18 BRPM | OXYGEN SATURATION: 99 % | WEIGHT: 138 LBS | BODY MASS INDEX: 25.4 KG/M2

## 2025-01-13 DIAGNOSIS — I10 ESSENTIAL HYPERTENSION: Primary | ICD-10-CM

## 2025-01-13 DIAGNOSIS — Z13.220 SCREENING FOR LIPID DISORDERS: ICD-10-CM

## 2025-01-13 DIAGNOSIS — M25.50 ARTHRALGIA, UNSPECIFIED JOINT: ICD-10-CM

## 2025-01-13 DIAGNOSIS — E78.5 HYPERLIPIDEMIA, UNSPECIFIED HYPERLIPIDEMIA TYPE: ICD-10-CM

## 2025-01-13 LAB
ALBUMIN SERPL BCP-MCNC: 4.5 G/DL (ref 3.4–4.8)
ALBUMIN/GLOB SERPL: 1.6 {RATIO}
ALP SERPL-CCNC: 83 U/L (ref 40–150)
ALT SERPL W P-5'-P-CCNC: 29 U/L
ANION GAP SERPL CALCULATED.3IONS-SCNC: 13 MMOL/L (ref 7–16)
AST SERPL W P-5'-P-CCNC: 32 U/L (ref 5–34)
BASOPHILS # BLD AUTO: 0.08 K/UL (ref 0–0.2)
BASOPHILS NFR BLD AUTO: 1.4 % (ref 0–1)
BILIRUB SERPL-MCNC: 0.7 MG/DL
BUN SERPL-MCNC: 19 MG/DL (ref 10–20)
BUN/CREAT SERPL: 23 (ref 6–20)
CALCIUM SERPL-MCNC: 9.2 MG/DL (ref 8.4–10.2)
CHLORIDE SERPL-SCNC: 102 MMOL/L (ref 98–107)
CO2 SERPL-SCNC: 29 MMOL/L (ref 23–31)
CREAT SERPL-MCNC: 0.83 MG/DL (ref 0.55–1.02)
DIFFERENTIAL METHOD BLD: ABNORMAL
EGFR (NO RACE VARIABLE) (RUSH/TITUS): 72 ML/MIN/1.73M2
EOSINOPHIL # BLD AUTO: 0.12 K/UL (ref 0–0.5)
EOSINOPHIL NFR BLD AUTO: 2.1 % (ref 1–4)
ERYTHROCYTE [DISTWIDTH] IN BLOOD BY AUTOMATED COUNT: 13.2 % (ref 11.5–14.5)
ERYTHROCYTE [SEDIMENTATION RATE] IN BLOOD BY WESTERGREN METHOD: 4 MM/HR (ref 0–30)
GLOBULIN SER-MCNC: 2.8 G/DL (ref 2–4)
GLUCOSE SERPL-MCNC: 97 MG/DL (ref 82–115)
HCT VFR BLD AUTO: 42.9 % (ref 38–47)
HGB BLD-MCNC: 13.7 G/DL (ref 12–16)
IMM GRANULOCYTES # BLD AUTO: 0.02 K/UL (ref 0–0.04)
IMM GRANULOCYTES NFR BLD: 0.4 % (ref 0–0.4)
LYMPHOCYTES # BLD AUTO: 1.73 K/UL (ref 1–4.8)
LYMPHOCYTES NFR BLD AUTO: 30.6 % (ref 27–41)
MCH RBC QN AUTO: 28.7 PG (ref 27–31)
MCHC RBC AUTO-ENTMCNC: 31.9 G/DL (ref 32–36)
MCV RBC AUTO: 89.7 FL (ref 80–96)
MONOCYTES # BLD AUTO: 0.44 K/UL (ref 0–0.8)
MONOCYTES NFR BLD AUTO: 7.8 % (ref 2–6)
MPC BLD CALC-MCNC: 10.8 FL (ref 9.4–12.4)
NEUTROPHILS # BLD AUTO: 3.26 K/UL (ref 1.8–7.7)
NEUTROPHILS NFR BLD AUTO: 57.7 % (ref 53–65)
NRBC # BLD AUTO: 0 X10E3/UL
NRBC, AUTO (.00): 0 %
PLATELET # BLD AUTO: 264 K/UL (ref 150–400)
POTASSIUM SERPL-SCNC: 3.8 MMOL/L (ref 3.5–5.1)
PROT SERPL-MCNC: 7.3 G/DL (ref 5.8–7.6)
RBC # BLD AUTO: 4.78 M/UL (ref 4.2–5.4)
RHEUMATOID FACT SER NEPH-ACNC: NEGATIVE [IU]/ML
SODIUM SERPL-SCNC: 140 MMOL/L (ref 136–145)
WBC # BLD AUTO: 5.65 K/UL (ref 4.5–11)

## 2025-01-13 PROCEDURE — 99213 OFFICE O/P EST LOW 20 MIN: CPT | Mod: ,,, | Performed by: FAMILY MEDICINE

## 2025-01-13 PROCEDURE — 80053 COMPREHEN METABOLIC PANEL: CPT | Mod: ,,, | Performed by: CLINICAL MEDICAL LABORATORY

## 2025-01-13 PROCEDURE — 85651 RBC SED RATE NONAUTOMATED: CPT | Mod: ,,, | Performed by: CLINICAL MEDICAL LABORATORY

## 2025-01-13 PROCEDURE — 86038 ANTINUCLEAR ANTIBODIES: CPT | Mod: ,,, | Performed by: CLINICAL MEDICAL LABORATORY

## 2025-01-13 PROCEDURE — 86430 RHEUMATOID FACTOR TEST QUAL: CPT | Mod: ,,, | Performed by: CLINICAL MEDICAL LABORATORY

## 2025-01-13 PROCEDURE — 85025 COMPLETE CBC W/AUTO DIFF WBC: CPT | Mod: ,,, | Performed by: CLINICAL MEDICAL LABORATORY

## 2025-01-13 NOTE — PROGRESS NOTES
Jennifer Hale is a 79 y.o. female seen today for follow-up on her hypertension her blood pressures remain well controlled but she has developed a purpuric lesion to her right knee.  She had a similar lesion that resolved on its own that affected her left wrist.  Patient did have a markedly elevated lipid panel earlier in the year and we will plan on rechecking her lipids in 3 months or so.  The patient does have joint pain and back pain.    Past Medical History:   Diagnosis Date    Abdominal hernia     Basal cell carcinoma     Cardiac murmur     GERD (gastroesophageal reflux disease)     Hyperlipidemia     Hypertension      Family History   Problem Relation Name Age of Onset    Breast cancer Mother      Lymphoma Mother      Hypertension Mother      Lymphoma Father      Breast cancer Sister      Lymphoma Sister      Colon cancer Maternal Grandfather      Lymphoma Brother      Breast cancer Maternal Cousin      Breast cancer Maternal Cousin      Breast cancer Maternal Cousin       Current Outpatient Medications on File Prior to Visit   Medication Sig Dispense Refill    ascorbic acid, vitamin C, (VITAMIN C) 500 MG tablet Vitamin C 500 MG Oral Tablet QTY: 0 tablet Days: 0 Refills: 0  Written: 05/11/22 Patient Instructions:      atorvastatin (LIPITOR) 10 MG tablet Take 1 tablet (10 mg total) by mouth once daily. 90 tablet 3    cholecalciferol, vitamin D3, (VITAMIN D3) 50 mcg (2,000 unit) Cap capsule Vitamin D3 50 MCG (2000 UT) Oral Capsule QTY: 0 capsule Days: 0 Refills: 0  Written: 05/11/22 Patient Instructions:      colestipoL (COLESTID) 5 gram Pack 1/2 scoop at lunch time      estradioL (IMVEXXY MAINTENANCE PACK) 4 mcg Inst Place 1 suppository vaginally twice a week. At bedtime 8 each 11    fluticasone propionate (FLONASE) 50 mcg/actuation nasal spray 1 spray (50 mcg total) by Each Nostril route once daily. 48 g 3    multivitamin capsule Take 1 capsule by mouth once daily.      omega 3-dha-epa-fish oil (FISH OIL)  60- mg Cap 1 capsule.      triamcinolone acetonide 0.025% (KENALOG) 0.025 % Oint Apply topically 2 (two) times daily as needed (soreness itching to scalp wound from wasp sting). 80 g 0    triamterene-hydrochlorothiazide 37.5-25 mg (DYAZIDE) 37.5-25 mg per capsule Take 1 capsule by mouth every morning. 90 capsule 3    DOCOSAHEXAENOIC ACID ORAL Take 500 mg by mouth. (Patient not taking: Reported on 11/14/2024)      GAVILYTE-G 236-22.74-6.74 -5.86 gram suspension TAKE 4L BY MOUTH ONCE FOR 1 DOSE (Patient not taking: Reported on 1/13/2025)      nirmatrelvir-ritonavir (PAXLOVID) 300 mg (150 mg x 2)-100 mg copackaged tablets (EUA) Take 3 tablets by mouth 2 (two) times daily. Each dose contains 2 nirmatrelvir (pink tablets) and 1 ritonavir (white tablet). Take all 3 tablets together. Hold lipitor (atorvastatin)  while on this pack. You may restart it 5 days after completing pack. (Patient not taking: Reported on 1/13/2025) 30 tablet 0    [DISCONTINUED] tolterodine (DETROL LA) 4 MG 24 hr capsule Take 1 capsule (4 mg total) by mouth once daily. (Patient not taking: Reported on 1/12/2022) 30 capsule 2     No current facility-administered medications on file prior to visit.     Immunization History   Administered Date(s) Administered    COVID-19 MRNA, LN-S PF (MODERNA HALF 0.25 ML DOSE) 11/05/2021, 07/01/2022    COVID-19, MRNA, LN-S, PF (MODERNA FULL 0.5 ML DOSE) 03/01/2021    COVID-19, mRNA, LNP-S, bivalent booster, PF (Moderna Omicron)12 + YEARS 12/16/2022    Hepatitis A, Adult 09/28/2022, 03/28/2023    Influenza 10/01/2021, 10/10/2023    Pneumococcal Conjugate - 20 Valent 10/01/2024       Review of Systems   Constitutional:  Negative for fever, malaise/fatigue and weight loss.   Respiratory:  Negative for shortness of breath.    Cardiovascular:  Negative for chest pain and palpitations.   Gastrointestinal:  Negative for nausea and vomiting.   Musculoskeletal:  Positive for back pain, joint pain and myalgias.   Skin:   Positive for rash.   Psychiatric/Behavioral:  Negative for depression.       Vitals:    01/13/25 1558   BP: 129/78   Pulse: 67   Resp: 18   Temp: 98.6 °F (37 °C)       Physical Exam  Vitals reviewed.   Constitutional:       Appearance: Normal appearance.   HENT:      Head: Normocephalic.   Eyes:      Extraocular Movements: Extraocular movements intact.      Conjunctiva/sclera: Conjunctivae normal.      Pupils: Pupils are equal, round, and reactive to light.   Neck:      Thyroid: No thyroid mass or thyromegaly.   Cardiovascular:      Rate and Rhythm: Normal rate and regular rhythm.      Heart sounds: Normal heart sounds. No murmur heard.     No gallop.   Pulmonary:      Effort: Pulmonary effort is normal. No respiratory distress.      Breath sounds: Normal breath sounds. No wheezing or rales.   Skin:     General: Skin is warm and dry.      Coloration: Skin is not jaundiced or pale.   Neurological:      Mental Status: She is alert.   Psychiatric:         Mood and Affect: Mood normal.         Behavior: Behavior normal.         Thought Content: Thought content normal.         Judgment: Judgment normal.        Assessment and Plan  1. Essential hypertension  -     Comprehensive Metabolic Panel; Future; Expected date: 01/13/2025  -     CBC Auto Differential; Future; Expected date: 04/13/2025  -     Comprehensive Metabolic Panel; Future; Expected date: 04/13/2025    2. Hyperlipidemia, unspecified hyperlipidemia type  -     Lipid Panel; Future; Expected date: 04/13/2025    3. Arthralgia, unspecified joint  -     CBC Auto Differential; Future; Expected date: 01/13/2025  -     Sedimentation Rate; Future; Expected date: 01/13/2025  -     Rheumatoid Factor Screen; Future; Expected date: 01/13/2025  -     JAY EIA w/ Reflex to dsDNA/KITTY; Future; Expected date: 01/13/2025             Return to clinic in 3 months for follow-up lab work and then an office visit or as needed.  If the lesion persists we may consider a  dermatology    Health Maintenance Topics with due status: Not Due       Topic Last Completion Date    DEXA Scan 09/07/2023    Lipid Panel 07/30/2024

## 2025-01-14 ENCOUNTER — TELEPHONE (OUTPATIENT)
Dept: FAMILY MEDICINE | Facility: CLINIC | Age: 80
End: 2025-01-14
Payer: MEDICARE

## 2025-01-14 LAB — ANA SER QL: NEGATIVE

## 2025-02-06 ENCOUNTER — HOSPITAL ENCOUNTER (OUTPATIENT)
Dept: RADIOLOGY | Facility: HOSPITAL | Age: 80
Discharge: HOME | End: 2025-02-06
Attending: FAMILY MEDICINE
Payer: MEDICARE

## 2025-02-06 VITALS — WEIGHT: 140 LBS | BODY MASS INDEX: 25.76 KG/M2 | HEIGHT: 62 IN

## 2025-02-06 DIAGNOSIS — Z12.31 VISIT FOR SCREENING MAMMOGRAM: ICD-10-CM

## 2025-02-06 PROCEDURE — 77063 BREAST TOMOSYNTHESIS BI: CPT | Mod: TC

## 2025-02-06 PROCEDURE — 77063 BREAST TOMOSYNTHESIS BI: CPT | Mod: 26,,, | Performed by: RADIOLOGY

## 2025-02-06 PROCEDURE — 77067 SCR MAMMO BI INCL CAD: CPT | Mod: 26,,, | Performed by: RADIOLOGY

## 2025-04-08 ENCOUNTER — RESULTS FOLLOW-UP (OUTPATIENT)
Dept: FAMILY MEDICINE | Facility: CLINIC | Age: 80
End: 2025-04-08

## 2025-04-09 ENCOUNTER — TELEPHONE (OUTPATIENT)
Dept: FAMILY MEDICINE | Facility: CLINIC | Age: 80
End: 2025-04-09
Payer: MEDICARE

## 2025-04-09 NOTE — TELEPHONE ENCOUNTER
----- Message from Raghavendra Lui MD sent at 4/8/2025  7:48 AM CDT -----  Mild elevation of her LDL and we can discuss at her office visit.  ----- Message -----  From: Lab, Background User  Sent: 4/7/2025   7:56 PM CDT  To: Raghavendra Lui MD

## 2025-04-14 ENCOUNTER — OFFICE VISIT (OUTPATIENT)
Dept: FAMILY MEDICINE | Facility: CLINIC | Age: 80
End: 2025-04-14
Payer: MEDICARE

## 2025-04-14 VITALS
DIASTOLIC BLOOD PRESSURE: 62 MMHG | HEART RATE: 58 BPM | HEIGHT: 62 IN | TEMPERATURE: 98 F | SYSTOLIC BLOOD PRESSURE: 98 MMHG | WEIGHT: 143 LBS | OXYGEN SATURATION: 99 % | RESPIRATION RATE: 18 BRPM | BODY MASS INDEX: 26.31 KG/M2

## 2025-04-14 DIAGNOSIS — E78.5 HYPERLIPIDEMIA, UNSPECIFIED HYPERLIPIDEMIA TYPE: Primary | ICD-10-CM

## 2025-04-14 PROCEDURE — 99213 OFFICE O/P EST LOW 20 MIN: CPT | Mod: ,,, | Performed by: FAMILY MEDICINE

## 2025-04-14 NOTE — Clinical Note
TETANUS VACCINE- will get today Shingles Vaccine(1 of 2)- reports received approx in 2022 from [facility] RSV Vaccine (Age 60+ and Pregnant patients)(1 - 1-dose 75+ series)- received from [facility] last fall COVID-19 Vaccine(5 - 2024-25 season)- received from [facility] last fall

## 2025-04-14 NOTE — PROGRESS NOTES
Jennifer Hale is a 80 y.o. female seen today for follow-up on her hyperlipidemia and hypertension.  Her labs are markedly improved with an LDL of 83 and the patient request a stay at her current dose of atorvastatin and continue to work on her diet.  The patient was also concerned about her elevated BUN and we discussed holding her diuretic until necessary for edema only.    Past Medical History:   Diagnosis Date    Abdominal hernia     Basal cell carcinoma     Cardiac murmur     GERD (gastroesophageal reflux disease)     Hyperlipidemia     Hypertension      Family History   Problem Relation Name Age of Onset    Lymphoma Mother      Hypertension Mother      Lymphoma Father      Breast cancer Sister      Lymphoma Sister      Colon cancer Maternal Grandfather      Lymphoma Brother      Breast cancer Maternal Cousin      Breast cancer Maternal Cousin      Breast cancer Maternal Cousin      Breast cancer Maternal Cousin      Breast cancer Maternal Cousin       Medications Ordered Prior to Encounter[1]  Immunization History   Administered Date(s) Administered    COVID-19 MRNA, LN-S PF (MODERNA HALF 0.25 ML DOSE) 11/05/2021, 07/01/2022    COVID-19, MRNA, LN-S, PF (MODERNA FULL 0.5 ML DOSE) 03/01/2021    COVID-19, mRNA, LNP-S, bivalent booster, PF (Moderna Omicron)12 + YEARS 12/16/2022    Hepatitis A, Adult 09/28/2022, 03/28/2023    Influenza 10/01/2021, 10/10/2023    Pneumococcal Conjugate - 20 Valent 10/01/2024       Review of Systems   Constitutional:  Negative for fever, malaise/fatigue and weight loss.   Respiratory:  Negative for shortness of breath.    Cardiovascular:  Negative for chest pain and palpitations.   Gastrointestinal:  Negative for nausea and vomiting.   Psychiatric/Behavioral:  Negative for depression.         Vitals:    04/14/25 0841   BP: 98/62   Pulse: (!) 58   Resp: 18   Temp: 98.2 °F (36.8 °C)       Physical Exam  Vitals reviewed.   Constitutional:       Appearance: Normal appearance.   HENT:       Head: Normocephalic.   Eyes:      Extraocular Movements: Extraocular movements intact.      Conjunctiva/sclera: Conjunctivae normal.      Pupils: Pupils are equal, round, and reactive to light.   Neck:      Thyroid: No thyroid mass or thyromegaly.   Cardiovascular:      Rate and Rhythm: Normal rate and regular rhythm.      Heart sounds: Normal heart sounds. No murmur heard.     No gallop.   Pulmonary:      Effort: Pulmonary effort is normal. No respiratory distress.      Breath sounds: Normal breath sounds. No wheezing or rales.   Skin:     General: Skin is warm and dry.      Coloration: Skin is not jaundiced or pale.   Neurological:      Mental Status: She is alert.   Psychiatric:         Mood and Affect: Mood normal.         Behavior: Behavior normal.         Thought Content: Thought content normal.         Judgment: Judgment normal.          Assessment and Plan  1. Need for vaccine for Td (tetanus-diphtheria)    2. Hyperlipidemia, unspecified hyperlipidemia type  -     CBC Auto Differential; Future; Expected date: 10/14/2025  -     Comprehensive Metabolic Panel; Future; Expected date: 10/14/2025  -     Lipid Panel; Future; Expected date: 10/14/2025             Return to clinic in a few days to confirm the accuracy of her blood pressure cuff then in 2-3 weeks for nurse visit with home blood pressure log.  She will have a follow-up office visit with me in 6 months after lab work.    Health Maintenance Topics with due status: Not Due       Topic Last Completion Date    DEXA Scan 09/07/2023    Lipid Panel 04/07/2025            [1]   Current Outpatient Medications on File Prior to Visit   Medication Sig Dispense Refill    ascorbic acid, vitamin C, (VITAMIN C) 500 MG tablet Vitamin C 500 MG Oral Tablet QTY: 0 tablet Days: 0 Refills: 0  Written: 05/11/22 Patient Instructions:      atorvastatin (LIPITOR) 10 MG tablet Take 1 tablet (10 mg total) by mouth once daily. 90 tablet 3    cholecalciferol, vitamin D3, (VITAMIN  D3) 50 mcg (2,000 unit) Cap capsule Vitamin D3 50 MCG (2000 UT) Oral Capsule QTY: 0 capsule Days: 0 Refills: 0  Written: 05/11/22 Patient Instructions:      estradioL (IMVEXXY MAINTENANCE PACK) 4 mcg Inst Place 1 suppository vaginally twice a week. At bedtime 8 each 11    fluticasone propionate (FLONASE) 50 mcg/actuation nasal spray 1 spray (50 mcg total) by Each Nostril route once daily. 48 g 3    multivitamin capsule Take 1 capsule by mouth once daily.      omega 3-dha-epa-fish oil (FISH OIL) 60- mg Cap 1 capsule.      triamcinolone acetonide 0.025% (KENALOG) 0.025 % Oint Apply topically 2 (two) times daily as needed (soreness itching to scalp wound from wasp sting). 80 g 0    triamterene-hydrochlorothiazide 37.5-25 mg (DYAZIDE) 37.5-25 mg per capsule Take 1 capsule by mouth every morning. 90 capsule 3    colestipoL (COLESTID) 5 gram Pack 1/2 scoop at lunch time (Patient not taking: Reported on 4/14/2025)      DOCOSAHEXAENOIC ACID ORAL Take 500 mg by mouth. (Patient not taking: Reported on 11/14/2024)      GAVILYTE-G 236-22.74-6.74 -5.86 gram suspension TAKE 4L BY MOUTH ONCE FOR 1 DOSE (Patient not taking: Reported on 11/14/2024)      nirmatrelvir-ritonavir (PAXLOVID) 300 mg (150 mg x 2)-100 mg copackaged tablets (EUA) Take 3 tablets by mouth 2 (two) times daily. Each dose contains 2 nirmatrelvir (pink tablets) and 1 ritonavir (white tablet). Take all 3 tablets together. Hold lipitor (atorvastatin)  while on this pack. You may restart it 5 days after completing pack. (Patient not taking: Reported on 11/14/2024) 30 tablet 0    [DISCONTINUED] tolterodine (DETROL LA) 4 MG 24 hr capsule Take 1 capsule (4 mg total) by mouth once daily. (Patient not taking: Reported on 1/12/2022) 30 capsule 2     No current facility-administered medications on file prior to visit.

## 2025-04-21 ENCOUNTER — CLINICAL SUPPORT (OUTPATIENT)
Dept: FAMILY MEDICINE | Facility: CLINIC | Age: 80
End: 2025-04-21
Payer: MEDICARE

## 2025-04-21 VITALS — SYSTOLIC BLOOD PRESSURE: 138 MMHG | DIASTOLIC BLOOD PRESSURE: 76 MMHG

## 2025-04-21 DIAGNOSIS — I10 HYPERTENSION, UNSPECIFIED TYPE: Primary | ICD-10-CM

## 2025-04-21 NOTE — PROGRESS NOTES
Patient brought in her home BP cuff for calibrations. Patients home cuff was reading 153/82, manual reading was 138/76. After consulting with NP, patient was notified it was recommended to get a newer updated home cuff automatic BP machine. Patient stated she was going to [employer] this afternoon and would get a new cuff and return to the clinic for calibration of the new cuff. Patient was non symptomatic and stated she had taken her BP medications today. Presents today for blood pressure check.       Patient returned to the clinic this after with a new home BP cuff that she purchased after setting up the new machine her machine read 148/79 and manual it was 146/80. Patient stated she was non symptomatic and she felt like it may have been a little elevated since she had just finished running errands and having lunch.Patient stated Dr Lui wanted her to get a home log due to her BP being low at her previous appointment and she would continue with this new machine and bring that home log in at her next FU appointment with Dr Lui.

## 2025-04-28 ENCOUNTER — CLINICAL SUPPORT (OUTPATIENT)
Dept: FAMILY MEDICINE | Facility: CLINIC | Age: 80
End: 2025-04-28
Payer: MEDICARE

## 2025-04-28 VITALS — SYSTOLIC BLOOD PRESSURE: 132 MMHG | DIASTOLIC BLOOD PRESSURE: 66 MMHG

## 2025-04-28 DIAGNOSIS — I10 ESSENTIAL HYPERTENSION: Primary | ICD-10-CM

## 2025-04-28 NOTE — PROGRESS NOTES
Patient is in clinic today for nurse visit regarding her blood pressure. She reports she is still taking the dyazide at this time and has taken the medication this morning. Dr Lui made aware of bp reading in clinic and of the readings on her home bp log. He advised for patient to hold her dyazide for 1 week and then return to clinic for another nurse visit. Notified patient of Dr Lui's recommendations and she agreed. Advised for her to contact the clinic if she begins to have elevated bp readings and or symptoms, she voiced understanding.

## 2025-05-05 ENCOUNTER — CLINICAL SUPPORT (OUTPATIENT)
Dept: FAMILY MEDICINE | Facility: CLINIC | Age: 80
End: 2025-05-05
Payer: MEDICARE

## 2025-05-05 VITALS — SYSTOLIC BLOOD PRESSURE: 152 MMHG | DIASTOLIC BLOOD PRESSURE: 85 MMHG

## 2025-05-05 DIAGNOSIS — I10 HYPERTENSION, UNSPECIFIED TYPE: Primary | ICD-10-CM

## 2025-05-05 NOTE — PROGRESS NOTES
Patient in clinic today for one week follow up nurse visit regarding her bp. She reports she has been off of the dyazide for one week, however, she has noticed an increase in her blood pressure with some swelling to her ankles. She did bring a home log which is added below. Dr Lui reviewed bp in clinic and home bp log. Dr Lui did communicate with patient today to restart her dyazide and stay well hydrated.

## 2025-05-12 ENCOUNTER — OFFICE VISIT (OUTPATIENT)
Dept: VASCULAR SURGERY | Facility: CLINIC | Age: 80
End: 2025-05-12
Payer: MEDICARE

## 2025-05-12 VITALS
RESPIRATION RATE: 18 BRPM | BODY MASS INDEX: 26.17 KG/M2 | HEART RATE: 71 BPM | SYSTOLIC BLOOD PRESSURE: 138 MMHG | WEIGHT: 142.19 LBS | DIASTOLIC BLOOD PRESSURE: 71 MMHG | HEIGHT: 62 IN

## 2025-05-12 DIAGNOSIS — M79.604 LEG PAIN, BILATERAL: ICD-10-CM

## 2025-05-12 DIAGNOSIS — M79.605 LEG PAIN, BILATERAL: ICD-10-CM

## 2025-05-12 DIAGNOSIS — R25.2 LEG CRAMPING: ICD-10-CM

## 2025-05-12 DIAGNOSIS — I83.93 VARICOSE VEINS OF BOTH LOWER EXTREMITIES, UNSPECIFIED WHETHER COMPLICATED: ICD-10-CM

## 2025-05-12 DIAGNOSIS — I87.2 VENOUS INSUFFICIENCY: Primary | ICD-10-CM

## 2025-05-12 PROCEDURE — 99214 OFFICE O/P EST MOD 30 MIN: CPT | Mod: S$PBB,,, | Performed by: FAMILY MEDICINE

## 2025-05-12 PROCEDURE — 99999 PR PBB SHADOW E&M-EST. PATIENT-LVL IV: CPT | Mod: PBBFAC,,, | Performed by: FAMILY MEDICINE

## 2025-05-12 PROCEDURE — 99214 OFFICE O/P EST MOD 30 MIN: CPT | Mod: PBBFAC | Performed by: FAMILY MEDICINE

## 2025-05-12 NOTE — PROGRESS NOTES
VEIN CENTER CLINIC NOTE    Patient ID: Jennifer Hale is a 80 y.o. female.    I. HISTORY     Chief Complaint:   Chief Complaint   Patient presents with    Follow-up     6M VENOUS        HPI: Jennifer Hale is a 80 y.o. female who presents today for a six-month venous follow-up.  She has undergone 9-months of conservative therapy for chronic venous insufficiency consisting of 15-20 millimeter of mercury compression stockings leg elevation and calf exercises.  The patient states that these measures have helped to improve her lower extremity swelling, pain and cramping.  She feels as though she is doing much better at this time and would like to continue with conservative measures in lieu of any correctional procedures.    Clinical summary:  The patient was initially seen on 08/27/2024 Dr. Lui for evaluation of lower extremity discomfort, cramping and varicose veins.  Symptoms are progressive/worsening and began greater than 6 months ago.  Location is bilateral lower extremities below the knees, worse on the right. Symptoms are worse at the end of the day.  History of venous interventions includes none.  Denies family history of venous disease.  Denies history of DVT or cellulitis.    Bilateral complete venous reflux study performed 09/03/2024 shows no evidence of DVT bilaterally.  The study also shows dilation and proximal reflux of the bilateral great saphenous veins.  No reflux noted in the bilateral small saphenous veins.    Venous Disease Medical Necessity Documentation Initial Visit Date:  08/27/2024 Return Check Date: 11/12/2024   Have you ever had a rupture or bleed from a varicose vein in your leg(s)?              [] Yes  [x] No   [] Yes   [x] No   Have you ever been diagnosed with phlebitis, cellulitis, or inflammation in the area of the varicose veins of  your leg(s)?  [] Yes  [x] No    [] Yes   [x] No   Do you have darkened or inflamed skin on your legs?   [x] Yes   [] No   [x] Yes   [] No   Do you have  leg swelling?     [] Yes   [x] No   [] Yes   [x] No   Do you have leg pain?   [x] Yes   [] No   [] Yes   [] No   If yes, describe the type of pain?    []   Stabbing []  Radiating [x]  Aching   []  Tightness []  Throbbing               []  Burning []  Cramping              Do you have leg discomfort?   [x] Yes   [] No   [x] Yes   [] No   If yes, describe the type of discomfort?    []  Heaviness []  Fullness   [x]  Restlessness [] Tired/Fatigued [] Itching          Occassions at night    Have you ever worn compression hose?   [] Yes   [x] No  Yes   [] No   If yes, how long?        3 months   Do you elevate your legs while sitting?   [x] Yes   [] No   [x] Yes   [] No   Does venous disease (varicose veins, ulcers, skin changes, leg pain/swelling) interfere with your daily life?  If yes, check activities you are limited or unable to do.    []  Shower  []   Walk  []  Exercise  [] Play with children/grandchildren  []  Shop [] Work [] Stand for any period of time [] Sleep                               [] Sitting for an extended period of time.           [] Yes   [x] No   [] Yes   [x] No   Do you exercise/have you tried to exercise (i.e.  Walk our participate in a regular exercise routine)?  [x] Yes  [] No   [x] Yes   [] No   BMI   24.95 24.2          Past Medical History:   Diagnosis Date    Abdominal hernia     Basal cell carcinoma     Cardiac murmur     GERD (gastroesophageal reflux disease)     Hyperlipidemia     Hypertension         Past Surgical History:   Procedure Laterality Date    basil cell carcinoma      left cheek    BREAST BIOPSY      DILATION AND CURETTAGE OF UTERUS      HYSTERECTOMY      LAPAROSCOPIC CHOLECYSTECTOMY N/A 05/26/2022    Procedure: CHOLECYSTECTOMY, LAPAROSCOPIC;  Surgeon: Gil Monique DO;  Location: [facility];  Service: General;  Laterality: N/A;    TEAR DUCT SURGERY      right eye    TOTAL ABDOMINAL HYSTERECTOMY W/ BILATERAL SALPINGOOPHORECTOMY  01/18/2000       Social History     Tobacco  Use   Smoking Status Never    Passive exposure: Never   Smokeless Tobacco Never         Current Outpatient Medications:     ascorbic acid, vitamin C, (VITAMIN C) 500 MG tablet, Vitamin C 500 MG Oral Tablet QTY: 0 tablet Days: 0 Refills: 0  Written: 05/11/22 Patient Instructions:, Disp: , Rfl:     atorvastatin (LIPITOR) 10 MG tablet, Take 1 tablet (10 mg total) by mouth once daily., Disp: 90 tablet, Rfl: 3    cholecalciferol, vitamin D3, (VITAMIN D3) 50 mcg (2,000 unit) Cap capsule, Vitamin D3 50 MCG (2000 UT) Oral Capsule QTY: 0 capsule Days: 0 Refills: 0  Written: 05/11/22 Patient Instructions:, Disp: , Rfl:     estradioL (IMVEXXY MAINTENANCE PACK) 4 mcg Inst, Place 1 suppository vaginally twice a week. At bedtime, Disp: 8 each, Rfl: 11    fluticasone propionate (FLONASE) 50 mcg/actuation nasal spray, 1 spray (50 mcg total) by Each Nostril route once daily., Disp: 48 g, Rfl: 3    multivitamin capsule, Take 1 capsule by mouth once daily., Disp: , Rfl:     omega 3-dha-epa-fish oil (FISH OIL) 60- mg Cap, 1 capsule., Disp: , Rfl:     triamcinolone acetonide 0.025% (KENALOG) 0.025 % Oint, Apply topically 2 (two) times daily as needed (soreness itching to scalp wound from wasp sting)., Disp: 80 g, Rfl: 0    triamterene-hydrochlorothiazide 37.5-25 mg (DYAZIDE) 37.5-25 mg per capsule, Take 1 capsule by mouth every morning., Disp: 90 capsule, Rfl: 3    Review of Systems   Constitutional:  Negative for activity change, chills, diaphoresis, fatigue and fever.   Respiratory:  Negative for cough and shortness of breath.    Cardiovascular:  Negative for chest pain and claudication.        Hyperpigmentation LE   Gastrointestinal:  Negative for nausea and vomiting.   Musculoskeletal:  Positive for leg pain. Negative for joint swelling.   Integumentary:  Negative for rash and wound.   Neurological:  Negative for weakness and numbness.          II. PHYSICAL EXAM     Physical Exam  Constitutional:       General: She is awake.  She is not in acute distress.     Appearance: Normal appearance. She is not ill-appearing or toxic-appearing.   HENT:      Head: Normocephalic and atraumatic.   Eyes:      Extraocular Movements: Extraocular movements intact.      Conjunctiva/sclera: Conjunctivae normal.      Pupils: Pupils are equal, round, and reactive to light.   Neck:      Vascular: No carotid bruit or JVD.   Cardiovascular:      Rate and Rhythm: Normal rate and regular rhythm.      Pulses:           Dorsalis pedis pulses are detected w/ Doppler on the right side and detected w/ Doppler on the left side.        Posterior tibial pulses are detected w/ Doppler on the right side and detected w/ Doppler on the left side.      Heart sounds: No murmur heard.  Pulmonary:      Effort: Pulmonary effort is normal. No respiratory distress.      Breath sounds: No stridor. No wheezing, rhonchi or rales.   Musculoskeletal:         General: No swelling, tenderness or deformity.      Right lower leg: Edema present.      Left lower leg: No edema.      Comments: No evidence of cellulitis or open ulceration bilaterally.   Feet:      Comments: Triphasic hand-held dopplerable pulses of the bilateral dorsalis pedis and posterior tibial arteries.  Skin:     General: Skin is warm.      Capillary Refill: Capillary refill takes less than 2 seconds.      Coloration: Skin is not ashen.      Findings: No bruising, erythema, lesion, rash or wound.   Neurological:      Mental Status: She is alert and oriented to person, place, and time.      Motor: No weakness.   Psychiatric:         Speech: Speech normal.         Behavior: Behavior normal. Behavior is cooperative.         Reticular/Spider veins noted:  RLE: anterior calf, medial calf, posterior calf, and lateral calf  LLE: anterior calf, medial calf, posterior calf, and lateral calf    Varicose veins noted:  RLE: medial calf  LLE:  medial calf    CEAP Classification  Clinical Signs: Class 4 - Skin changes ascribed to venous  disease  Etiologic Classification: Primary  Anatomic distribution: Superficial  Pathophysiologic dysfunction: Reflux                         Venous Clinical Severity Score  Pain:2=Daily, moderate activity limitation, occasional analgesics  Varicose Veins: 1=Few, scattered. Branch varicose veins  Venous Edema: 1=Evening ankle edema only  Pigmentation: 1=Diffuse, but limited in area and old (brown)  Inflammation: 0=None  Induration: 0=None  Number of Active Ulcers: 0=0  Active Ulceration, Duration: 0=None  Active Ulcer Size: 0=None  Compressive Therapy: 3=Full compliance, stockings + elevation  Total Score: 8       III. ASSESSMENT & PLAN (MEDICAL DECISION MAKING)     1. Venous insufficiency    2. Varicose veins of both lower extremities, unspecified whether complicated    3. Leg pain, bilateral    4. Leg cramping        Assessment/Diagnosis and Plan:  Previous ultrasound of lower extremities reveals positive evidence of venous insufficiency in the bilateral great saphenous veins.  Plan for conservative medical treatment at this time. The patient may benefit from endovenous ablation in the future.     - continue compression with 15-20 mmHg Rx stockings  - Therapeutic leg elevation  - Calf pumping exercises  - RTC 12 months for further evaluation     Pelon Nagel DO

## 2025-06-05 ENCOUNTER — PATIENT OUTREACH (OUTPATIENT)
Facility: HOSPITAL | Age: 80
End: 2025-06-05
Payer: MEDICARE

## 2025-06-19 ENCOUNTER — APPOINTMENT (OUTPATIENT)
Dept: RADIOLOGY | Facility: CLINIC | Age: 80
End: 2025-06-19
Attending: NURSE PRACTITIONER
Payer: MEDICARE

## 2025-06-19 ENCOUNTER — OFFICE VISIT (OUTPATIENT)
Dept: FAMILY MEDICINE | Facility: CLINIC | Age: 80
End: 2025-06-19
Payer: MEDICARE

## 2025-06-19 VITALS
BODY MASS INDEX: 25.58 KG/M2 | TEMPERATURE: 98 F | SYSTOLIC BLOOD PRESSURE: 123 MMHG | HEART RATE: 65 BPM | OXYGEN SATURATION: 97 % | RESPIRATION RATE: 15 BRPM | DIASTOLIC BLOOD PRESSURE: 69 MMHG | WEIGHT: 139 LBS | HEIGHT: 62 IN

## 2025-06-19 DIAGNOSIS — R10.9 ABDOMINAL PAIN, UNSPECIFIED ABDOMINAL LOCATION: ICD-10-CM

## 2025-06-19 DIAGNOSIS — R30.0 DIFFICULT OR PAINFUL URINATION: Primary | ICD-10-CM

## 2025-06-19 LAB
BILIRUB UR QL STRIP: NEGATIVE
CLARITY UR: CLEAR
COLOR UR: COLORLESS
GLUCOSE UR STRIP-MCNC: NORMAL MG/DL
KETONES UR STRIP-SCNC: NEGATIVE MG/DL
LEUKOCYTE ESTERASE UR QL STRIP: NEGATIVE
NITRITE UR QL STRIP: NEGATIVE
PH UR STRIP: 6 PH UNITS
PROT UR QL STRIP: NEGATIVE
RBC # UR STRIP: NEGATIVE /UL
SP GR UR STRIP: 1.01
UROBILINOGEN UR STRIP-ACNC: NORMAL MG/DL

## 2025-06-19 PROCEDURE — 99213 OFFICE O/P EST LOW 20 MIN: CPT | Mod: ,,, | Performed by: NURSE PRACTITIONER

## 2025-06-19 PROCEDURE — 74018 RADEX ABDOMEN 1 VIEW: CPT | Mod: 26,,, | Performed by: RADIOLOGY

## 2025-06-19 PROCEDURE — 74018 RADEX ABDOMEN 1 VIEW: CPT | Mod: TC,RHCUB | Performed by: NURSE PRACTITIONER

## 2025-06-19 PROCEDURE — 81003 URINALYSIS AUTO W/O SCOPE: CPT | Mod: QW,,, | Performed by: CLINICAL MEDICAL LABORATORY

## 2025-06-19 NOTE — PROGRESS NOTES
"[facility]    Chief Complaint      Chief Complaint   Patient presents with    Abdominal Pain     Lower, sharp pain and achy all over, from navel to anus Patient reports it started on the 13th while in [place]. Patient reports while on the airplane she felt like her insides were heavy and felt like they were " falling out" patient reports she was unable to urinate or have a bowel movement. Patient increased water intake and was after 91/2 hours was able to urinate. And after miralax and 6 days had a very small bowel movement        History of Present Illness      Jennifer Hale is a 80 y.o. female. She  has a past medical history of Abdominal hernia, Basal cell carcinoma, Cardiac murmur, GERD (gastroesophageal reflux disease), Hyperlipidemia, and Hypertension., who presents today for lower abdominal pain x1 week.  States it started while she was on the airplane traveling.  Felt a heaviness in her pelvic area and was unable to urinate or have a BM.  After about 9 hrs that day she states she was able to urinate and that has been ok since.  Has struggles to have a BM and has taken Miralax which did result in a small BM.     Past Medical History:  Past Medical History:   Diagnosis Date    Abdominal hernia     Basal cell carcinoma     Cardiac murmur     GERD (gastroesophageal reflux disease)     Hyperlipidemia     Hypertension        Past Surgical History:   has a past surgical history that includes Total abdominal hysterectomy w/ bilateral salpingoophorectomy (01/18/2000); Tear duct surgery; basil cell carcinoma; Hysterectomy; Laparoscopic cholecystectomy (N/A, 05/26/2022); Dilation and curettage of uterus; and Breast biopsy.    Social History:  Social History[1]    I personally reviewed all past medical, surgical, and social.     Review of Systems   Constitutional:  Negative for chills and fever.   Respiratory:  Negative for shortness of breath.    Cardiovascular: Negative.    Gastrointestinal:  Positive for " "abdominal pain and constipation. Negative for diarrhea, nausea and vomiting.   Musculoskeletal:  Negative for gait problem.   Skin: Negative.    Neurological:  Negative for headaches.        Medications:  Encounter Medications[2]    Allergies:  Review of patient's allergies indicates:   Allergen Reactions    Sulfa (sulfonamide antibiotics) Hives       Health Maintenance:  Immunization History   Administered Date(s) Administered    COVID-19 MRNA, LN-S PF (MODERNA HALF 0.25 ML DOSE) 11/05/2021, 07/01/2022    COVID-19, MRNA, LN-S, PF (MODERNA FULL 0.5 ML DOSE) 03/01/2021, 03/25/2025    COVID-19, mRNA, LNP-S, bivalent booster, PF (Moderna Omicron)12 + YEARS 12/16/2022    Hepatitis A, Adult 09/28/2022, 03/28/2023    Influenza 10/01/2021, 10/10/2023    MMR 06/01/2020    Pneumococcal Conjugate - 20 Valent 10/01/2024    Pneumococcal Polysaccharide - 23 Valent 09/14/2022    RSVpreF (Arexvy) 01/20/2025    Tdap 04/17/2025    Zoster Recombinant 08/04/2020, 10/03/2020      Health Maintenance   Topic Date Due    COVID-19 Vaccine (6 - 2024-25 season) 05/20/2025    DEXA Scan  09/07/2025    Lipid Panel  04/07/2030    TETANUS VACCINE  04/17/2035    Shingles Vaccine  Completed    Influenza Vaccine  Completed    RSV Vaccine (Age 60+ and Pregnant patients)  Completed    Pneumococcal Vaccines (Age 50+)  Completed        Physical Exam      Vital Signs  Temp: 97.6 °F (36.4 °C)  Temp Source: Oral  Pulse: 65  Resp: 15  SpO2: 97 %  BP: 123/69  BP Location: Left arm  Patient Position: Sitting  Pain Score: 0-No pain  Height and Weight  Height: 5' 2" (157.5 cm)  Weight: 63 kg (139 lb)  BSA (Calculated - sq m): 1.66 sq meters  BMI (Calculated): 25.4  Weight in (lb) to have BMI = 25: 136.4]    Physical Exam  Vitals and nursing note reviewed.   Constitutional:       Appearance: Normal appearance. She is well-developed.   HENT:      Head: Normocephalic.      Right Ear: Hearing normal.      Left Ear: Hearing normal.      Nose: Nose normal.   Eyes:    "   General: Lids are normal.      Extraocular Movements: Extraocular movements intact.      Conjunctiva/sclera: Conjunctivae normal.      Pupils: Pupils are equal, round, and reactive to light.   Cardiovascular:      Rate and Rhythm: Normal rate and regular rhythm.      Heart sounds: Normal heart sounds.   Pulmonary:      Effort: Pulmonary effort is normal.      Breath sounds: Normal breath sounds.   Abdominal:      General: Abdomen is flat. Bowel sounds are normal.      Palpations: Abdomen is soft.      Tenderness: There is abdominal tenderness in the suprapubic area. There is no guarding.       Musculoskeletal:         General: Normal range of motion.      Cervical back: Normal range of motion and neck supple.      Right lower leg: No edema.      Left lower leg: No edema.   Skin:     General: Skin is warm and dry.   Neurological:      Mental Status: She is alert and oriented to person, place, and time.      Gait: Gait is intact.   Psychiatric:         Behavior: Behavior is cooperative.          Laboratory:  CBC:  Recent Labs   Lab 11/06/24  1343 01/13/25  1641 04/07/25  0842   WBC 8.05 5.65 4.50   RBC 4.51 4.78 4.96   Hemoglobin 12.9 13.7 14.0   Hematocrit 40.2 42.9 44.8   Platelet Count 316 264 244   MCV 89.1 89.7 90.3   MCH 28.6 28.7 28.2   MCHC 32.1 31.9 L 31.3 L     CMP:  Recent Labs   Lab 11/06/24  1343 01/13/25  1641 04/07/25  0842   Glucose 90 97 84   Calcium 9.4 9.2 9.6   Albumin 3.4 L 4.5 4.6   Total Protein 6.9 7.3 7.4   Sodium 137 140 139   Potassium 3.3 L 3.8 3.9   CO2 30 29 29   Chloride 101 102 102   BUN 14 19 20   Alk Phos 90 83 72   ALT 21 29 23   AST 22 32 29   Bilirubin, Total 0.3 0.7 0.8     LIPIDS:  Recent Labs   Lab 11/30/23  0900 01/25/24  1416 07/30/24  1049 04/07/25  0842   TSH  --  1.600  --   --    HDL Cholesterol 62 H  --  59 56   Cholesterol 161  --  240 H 163   Triglycerides 106  --  217 H 122   LDL Calculated 78  --  138 83   Cholesterol/HDL Ratio (Risk Factor) 2.6  --  4.1 2.9    Non-HDL 99  --  181 107     TSH:  Recent Labs   Lab 01/25/24  1416   TSH 1.600     A1C:        Assessment/Plan     Jennifer Hale is a 80 y.o.female with:     1. Difficult or painful urination  -     Urinalysis ($)    2. Abdominal pain, unspecified abdominal location  -     X-Ray KUB; Future; Expected date: 06/19/2025       Encouraged patient to follow up with her GYN as well  Awaiting results of urine and KUB    Total time spent face-to-face and non-face-to-face coordinating care for this encounter was: 20 minutes     Chronic conditions status updated as per HPI.  Other than changes above, cont current medications and maintain follow up with specialists.  Return to clinic prn if symptoms worsen or fail to improve.    Kaylyn Strickland, [facility]  Reviewed on 06/24/2025 by Raghavendra Lui MD         [1]   Social History  Tobacco Use    Smoking status: Never     Passive exposure: Never    Smokeless tobacco: Never   Substance Use Topics    Alcohol use: Yes     Comment: only occas    Drug use: Never   [2]   Outpatient Encounter Medications as of 6/19/2025   Medication Sig Dispense Refill    ascorbic acid, vitamin C, (VITAMIN C) 500 MG tablet Vitamin C 500 MG Oral Tablet QTY: 0 tablet Days: 0 Refills: 0  Written: 05/11/22 Patient Instructions:      atorvastatin (LIPITOR) 10 MG tablet Take 1 tablet (10 mg total) by mouth once daily. 90 tablet 3    cholecalciferol, vitamin D3, (VITAMIN D3) 50 mcg (2,000 unit) Cap capsule Vitamin D3 50 MCG (2000 UT) Oral Capsule QTY: 0 capsule Days: 0 Refills: 0  Written: 05/11/22 Patient Instructions:      estradioL (IMVEXXY MAINTENANCE PACK) 4 mcg Inst Place 1 suppository vaginally twice a week. At bedtime 8 each 11    fluticasone propionate (FLONASE) 50 mcg/actuation nasal spray 1 spray (50 mcg total) by Each Nostril route once daily. 48 g 3    multivitamin capsule Take 1 capsule by mouth once daily.      omega 3-dha-epa-fish oil (FISH OIL) 60- mg Cap 1 capsule.       triamcinolone acetonide 0.025% (KENALOG) 0.025 % Oint Apply topically 2 (two) times daily as needed (soreness itching to scalp wound from wasp sting). 80 g 0    triamterene-hydrochlorothiazide 37.5-25 mg (DYAZIDE) 37.5-25 mg per capsule Take 1 capsule by mouth every morning. 90 capsule 3    [DISCONTINUED] tolterodine (DETROL LA) 4 MG 24 hr capsule Take 1 capsule (4 mg total) by mouth once daily. (Patient not taking: Reported on 1/12/2022) 30 capsule 2     No facility-administered encounter medications on file as of 6/19/2025.

## 2025-06-20 ENCOUNTER — TELEPHONE (OUTPATIENT)
Dept: FAMILY MEDICINE | Facility: CLINIC | Age: 80
End: 2025-06-20
Payer: MEDICARE

## 2025-06-20 ENCOUNTER — RESULTS FOLLOW-UP (OUTPATIENT)
Dept: FAMILY MEDICINE | Facility: CLINIC | Age: 80
End: 2025-06-20

## 2025-06-20 NOTE — TELEPHONE ENCOUNTER
----- Message from TAMIKA Tinoco sent at 6/20/2025 12:51 PM CDT -----  No acute issues with xray  ----- Message -----  From: Interface, Rad Results In  Sent: 6/19/2025  11:46 AM CDT  To: TAMIKA Clements

## 2025-06-20 NOTE — TELEPHONE ENCOUNTER
----- Message from TAMIKA Tinoco sent at 6/20/2025 12:50 PM CDT -----  Urine is normal  ----- Message -----  From: Lab, Background User  Sent: 6/19/2025   8:21 PM CDT  To: TAMIKA Clements

## 2025-06-23 ENCOUNTER — OFFICE VISIT (OUTPATIENT)
Dept: FAMILY MEDICINE | Facility: CLINIC | Age: 80
End: 2025-06-23
Payer: MEDICARE

## 2025-06-23 ENCOUNTER — APPOINTMENT (OUTPATIENT)
Dept: RADIOLOGY | Facility: CLINIC | Age: 80
End: 2025-06-23
Attending: NURSE PRACTITIONER
Payer: MEDICARE

## 2025-06-23 VITALS
TEMPERATURE: 98 F | BODY MASS INDEX: 26.09 KG/M2 | WEIGHT: 141.81 LBS | RESPIRATION RATE: 20 BRPM | HEIGHT: 62 IN | OXYGEN SATURATION: 99 % | HEART RATE: 66 BPM | DIASTOLIC BLOOD PRESSURE: 74 MMHG | SYSTOLIC BLOOD PRESSURE: 124 MMHG

## 2025-06-23 DIAGNOSIS — M79.645 FINGER PAIN, LEFT: ICD-10-CM

## 2025-06-23 DIAGNOSIS — M79.645 FINGER PAIN, LEFT: Primary | ICD-10-CM

## 2025-06-23 PROCEDURE — 99212 OFFICE O/P EST SF 10 MIN: CPT | Mod: ,,, | Performed by: NURSE PRACTITIONER

## 2025-06-23 PROCEDURE — 73140 X-RAY EXAM OF FINGER(S): CPT | Mod: 26,AQ,LT, | Performed by: RADIOLOGY

## 2025-06-23 PROCEDURE — 73140 X-RAY EXAM OF FINGER(S): CPT | Mod: TC,RHCUB,FY,LT | Performed by: NURSE PRACTITIONER

## 2025-06-23 NOTE — PROGRESS NOTES
[facility]  [address] [place] [address]  Phone: [phone]  Fax: [phone]       PATIENT NAME: Jennifer Hale   : 1945    AGE: 80 y.o. DATE OF ENCOUNTER: 25    MRN: [id number]      PCP: Raghavendra Lui MD    Subjective:   CHANGE CHIEF COMPLAINT      :[id number]}274}  Chief Complaint   Patient presents with    Finger Pain     Patient reports to the clinic today after closing the pinky finger of her left hand in the door of her refrigerator Saturday morning, it is swollen and painful.      Health Maintenance     COVID-19 Vaccine( season) declined  DEXA Scan due on 2025      History of Present Illness    HPI:  Patient is an 80-year-old female who injured her finger approximately 2 days ago on Saturday morning when she was getting something out of the refrigerator and the door closed on her left 5th finger. She was not fully alert when accessing the refrigerator and while her attention was directed towards her foot, the door closed on her finger. She reports pain and soreness, primarily localized around the distal knuckle area of the affected finger, with swelling at the joint and limited range of motion. She is unable to bend it normally. She applied ice to the injured area immediately after the incident, using a small bag wrapped around the finger.      ROS:  Musculoskeletal: +joint swelling, +limb pain, +limited movement         Allergies and Meds: 274}     Review of patient's allergies indicates:   Allergen Reactions    Sulfa (sulfonamide antibiotics) Hives        Current Outpatient Medications   Medication Sig Dispense Refill    ascorbic acid, vitamin C, (VITAMIN C) 500 MG tablet Vitamin C 500 MG Oral Tablet QTY: 0 tablet Days: 0 Refills: 0  Written: 22 Patient Instructions:      atorvastatin (LIPITOR) 10 MG tablet Take 1 tablet (10 mg total) by mouth once daily. 90 tablet 3    cholecalciferol, vitamin D3, (VITAMIN D3) 50 mcg (2,000  unit) Cap capsule Vitamin D3 50 MCG (2000 UT) Oral Capsule QTY: 0 capsule Days: 0 Refills: 0  Written: 05/11/22 Patient Instructions:      estradioL (IMVEXXY MAINTENANCE PACK) 4 mcg Inst Place 1 suppository vaginally twice a week. At bedtime 8 each 11    fluticasone propionate (FLONASE) 50 mcg/actuation nasal spray 1 spray (50 mcg total) by Each Nostril route once daily. 48 g 3    multivitamin capsule Take 1 capsule by mouth once daily.      omega 3-dha-epa-fish oil (FISH OIL) 60- mg Cap 1 capsule.      triamcinolone acetonide 0.025% (KENALOG) 0.025 % Oint Apply topically 2 (two) times daily as needed (soreness itching to scalp wound from wasp sting). 80 g 0    triamterene-hydrochlorothiazide 37.5-25 mg (DYAZIDE) 37.5-25 mg per capsule Take 1 capsule by mouth every morning. 90 capsule 3     No current facility-administered medications for this visit.       Labs:274}   I have reviewed labs below:    Lab Results   Component Value Date    WBC 4.50 04/07/2025    RBC 4.96 04/07/2025    HGB 14.0 04/07/2025    HCT 44.8 04/07/2025     04/07/2025     04/07/2025    K 3.9 04/07/2025     04/07/2025    CALCIUM 9.6 04/07/2025    GLU 84 04/07/2025    BUN 20 04/07/2025    CREATININE 0.75 04/07/2025    EGFRNONAA 74 05/24/2022    ALT 23 04/07/2025    AST 29 04/07/2025    CHOL 163 04/07/2025    TRIG 122 04/07/2025    HDL 56 04/07/2025    LDLCALC 83 04/07/2025    TSH 1.600 01/25/2024       Medical History: 274}     Past Medical History:   Diagnosis Date    Abdominal hernia     Basal cell carcinoma     Cardiac murmur     GERD (gastroesophageal reflux disease)     Hyperlipidemia     Hypertension       Tobacco Use History[1]   Past Surgical History:   Procedure Laterality Date    basil cell carcinoma      left cheek    BREAST BIOPSY      DILATION AND CURETTAGE OF UTERUS      HYSTERECTOMY      LAPAROSCOPIC CHOLECYSTECTOMY N/A 05/26/2022    Procedure: CHOLECYSTECTOMY, LAPAROSCOPIC;  Surgeon: Gil Monique DO;   "Location: [facility] OR;  Service: General;  Laterality: N/A;    TEAR DUCT SURGERY      right eye    TOTAL ABDOMINAL HYSTERECTOMY W/ BILATERAL SALPINGOOPHORECTOMY  01/18/2000        Health Maintenance:      Health Maintenance Topics with due status: Not Due       Topic Last Completion Date    Lipid Panel 04/07/2025    TETANUS VACCINE 04/17/2025       Objective:  274}   Vital Signs  Temp: 98.1 °F (36.7 °C)  Temp Source: Oral  Pulse: 66  Resp: 20  SpO2: 99 %  BP: 124/74  BP Location: Right arm  Patient Position: Sitting  Pain Score:   6  Pain Loc: Finger  Height and Weight  Height: 5' 2" (157.5 cm)  Weight: 64.3 kg (141 lb 12.8 oz)  BSA (Calculated - sq m): 1.68 sq meters  BMI (Calculated): 25.9  Weight in (lb) to have BMI = 25: 136.4    Over the last two weeks how often have you been bothered by little interest or pleasure in doing things: 0  Over the last two weeks how often have you been bothered by feeling down, depressed or hopeless: 0  PHQ-2 Total Score: 0    Wt Readings from Last 3 Encounters:   06/23/25 64.3 kg (141 lb 12.8 oz)   06/19/25 63 kg (139 lb)   05/12/25 64.5 kg (142 lb 3.2 oz)     Physical Exam  Vitals and nursing note reviewed.   Constitutional:       General: She is not in acute distress.     Appearance: Normal appearance. She is not ill-appearing.   HENT:      Head: Normocephalic.   Eyes:      Conjunctiva/sclera: Conjunctivae normal.   Cardiovascular:      Rate and Rhythm: Normal rate and regular rhythm.      Heart sounds: Normal heart sounds.   Pulmonary:      Effort: Pulmonary effort is normal. No respiratory distress.      Breath sounds: Normal breath sounds.   Musculoskeletal:      Left hand: Swelling (Left 5th finger) and bony tenderness (Left 5th finger, distal joint) present. Decreased range of motion (Left 5th finger).   Skin:     General: Skin is warm and dry.      Coloration: Skin is not jaundiced or pale.   Neurological:      Mental Status: She is alert and oriented to person, place, " and time.      Gait: Gait normal.   Psychiatric:         Mood and Affect: Mood normal.         Behavior: Behavior normal.         Thought Content: Thought content normal.         Judgment: Judgment normal.          Assessment and Plan: 274}       1. Finger pain, left  -     X-Ray Finger 2 or More Views Left; Future; Expected date: 06/23/2025        Assessment & Plan    IMPRESSION:  - Assessed finger injury from refrigerator door incident.  - Reviewed XR, noting possible tiny fracture near the end joint of the affected finger but x-ray results are pending.  - Considered treatment options, acknowledging limitations of in-office resources for finger splinting.  - Decided on conservative management with finger splint using available supplies and potential orthopedic referral pending official radiology report.    STRAIN OF RIGHT INDEX FINGER:  - Patient presents with soreness and swelling at the right index finger knuckle.  - Examination revealed swelling and bruising around the distal joint.  - XR Finger was ordered and completed, with official report pending to confirm fracture.  - Performed finger splinting using available supplies in the office to stabilize the injury as we do not have finger splints.  - Advised patient to keep the injured hand elevated as much as possible to minimize swelling and apply ice to the injured finger for 10-15 minutes, twice daily.  - Will consider referral to orthopedics for further guidance pending official XR results.    OSTEOARTHRITIS OF RIGHT HAND:  - Patient has significant arthritis in the hand, which may obscure fracture visibility on imaging.     Follow-up with PCP as usual    Signature:  TAMIKA Hardwick-BC    Future Appointments   Date Time Provider Department Center   10/14/2025  8:30 AM Raghavendra Lui MD [facility]   2/6/2026  8:15 AM Maggy Sheikh MD [facility]   2/12/2026 11:00 AM [facility]    5/12/2026  9:15 AM Pelon Nagel, DO [facility]     This note was generated with the assistance of ambient listening technology. Verbal consent was obtained by the patient and accompanying visitor(s) for the recording of patient appointment to facilitate this note. I attest to having reviewed and edited the generated note for accuracy, though some syntax or spelling errors may persist. Please contact the author of this note for any clarification.           [1]   Social History  Tobacco Use   Smoking Status Never    Passive exposure: Never   Smokeless Tobacco Never

## 2025-06-30 ENCOUNTER — RESULTS FOLLOW-UP (OUTPATIENT)
Dept: FAMILY MEDICINE | Facility: CLINIC | Age: 80
End: 2025-06-30

## 2025-06-30 ENCOUNTER — TELEPHONE (OUTPATIENT)
Dept: FAMILY MEDICINE | Facility: CLINIC | Age: 80
End: 2025-06-30
Payer: MEDICARE

## 2025-06-30 NOTE — TELEPHONE ENCOUNTER
Copied from CRM #[id number]. Topic: General Inquiry - Test Results  >> Jun 30, 2025  3:52 PM Trudi wrote:  Pt wants xray results from finger.   Who Called: Jennifer Hale    Caller is requesting information on test results.    Name of Test (Lab/Mammo/Etc): xray  Date of Test:   Where the test was performed:   Ordering Provider:       Patient's Preferred Phone Number on File: [phone]   Best Call Back Number, if different:  Additional Information:

## 2025-08-04 ENCOUNTER — OFFICE VISIT (OUTPATIENT)
Dept: FAMILY MEDICINE | Facility: CLINIC | Age: 80
End: 2025-08-04
Payer: MEDICARE

## 2025-08-04 VITALS
DIASTOLIC BLOOD PRESSURE: 74 MMHG | RESPIRATION RATE: 20 BRPM | TEMPERATURE: 98 F | OXYGEN SATURATION: 98 % | BODY MASS INDEX: 26.31 KG/M2 | SYSTOLIC BLOOD PRESSURE: 142 MMHG | HEIGHT: 62 IN | HEART RATE: 66 BPM | WEIGHT: 143 LBS

## 2025-08-04 DIAGNOSIS — T63.304A SPIDER BITE WOUND, UNDETERMINED INTENT, INITIAL ENCOUNTER: ICD-10-CM

## 2025-08-04 PROCEDURE — 99213 OFFICE O/P EST LOW 20 MIN: CPT | Mod: ,,, | Performed by: NURSE PRACTITIONER

## 2025-08-04 RX ORDER — CEPHALEXIN 250 MG/1
250 CAPSULE ORAL 3 TIMES DAILY
Qty: 15 CAPSULE | Refills: 0 | Status: SHIPPED | OUTPATIENT
Start: 2025-08-04 | End: 2025-08-09

## 2025-08-04 RX ORDER — TRIAMCINOLONE ACETONIDE 0.25 MG/G
OINTMENT TOPICAL 2 TIMES DAILY PRN
Qty: 80 G | Refills: 0 | Status: SHIPPED | OUTPATIENT
Start: 2025-08-04

## 2025-08-06 NOTE — PROGRESS NOTES
"Subjective:       Patient ID: Jennifer Hlae is a 80 y.o. female.    Chief Complaint: Insect Bite    Presents to clinic with complaint of a bite on her left arm.  Patient reports she was working in the [employer] a couple hours ago and sustained a bite.  She is concerned that it could be a spider bite.  She is not having any systemic symptoms.  She reports she is up-to-date on tetanus and declines a booster today.    Insect Bite      Review of Systems   Constitutional: Negative.    Respiratory: Negative.     Cardiovascular: Negative.    Skin:  Positive for itching.          Reviewed family, medical, surgical, and social history.    Objective:      BP (!) 142/74 (BP Location: Right arm, Patient Position: Sitting)   Pulse 66   Temp 98 °F (36.7 °C) (Oral)   Resp 20   Ht 5' 2" (1.575 m)   Wt 64.9 kg (143 lb)   SpO2 98%   BMI 26.16 kg/m²   Physical Exam  Vitals and nursing note reviewed.   Constitutional:       General: She is not in acute distress.     Appearance: Normal appearance. She is not ill-appearing, toxic-appearing or diaphoretic.   HENT:      Head: Normocephalic.      Mouth/Throat:      Mouth: Mucous membranes are moist.   Cardiovascular:      Rate and Rhythm: Normal rate and regular rhythm.      Heart sounds: Normal heart sounds.   Pulmonary:      Effort: Pulmonary effort is normal.      Breath sounds: Normal breath sounds.   Musculoskeletal:      Cervical back: Normal range of motion and neck supple.   Skin:     General: Skin is warm and dry.      Capillary Refill: Capillary refill takes less than 2 seconds.      Findings: Lesion present.      Comments: There is an approximately 5 mm, raised, red, lesion on the left forearm.  There is a tiny puncture wound to the center.  There is no fluctuance or induration.  Distal pulses and sensation are intact.   Neurological:      Mental Status: She is alert and oriented to person, place, and time.   Psychiatric:         Mood and Affect: Mood normal.         Behavior: " Behavior normal.         Thought Content: Thought content normal.         Judgment: Judgment normal.            No visits with results within 1 Day(s) from this visit.   Latest known visit with results is:   Office Visit on 06/19/2025   Component Date Value Ref Range Status    Color, UA 06/19/2025 Colorless  Colorless, Straw, Yellow, Dark Yellow, Light Yellow Final    Clarity, UA 06/19/2025 Clear  Clear Final    pH, UA 06/19/2025 6.0  5.0 to 8.0 pH Units Final    Leukocytes, UA 06/19/2025 Negative  Negative Final    Nitrites, UA 06/19/2025 Negative  Negative Final    Protein, UA 06/19/2025 Negative  Negative Final    Glucose, UA 06/19/2025 Normal  Normal mg/dL Final    Ketones, UA 06/19/2025 Negative  Negative mg/dL Final    Urobilinogen, UA 06/19/2025 Normal  0.2, 1.0, Normal mg/dL Final    Bilirubin, UA 06/19/2025 Negative  Negative Final    Blood, UA 06/19/2025 Negative  Negative Final    Specific Gravity, UA 06/19/2025 1.008  <=1.030 Final      Assessment:       1. Spider bite wound, undetermined intent, initial encounter        Plan:       Spider bite wound, undetermined intent, initial encounter  -     cephALEXin (KEFLEX) 250 MG capsule; Take 1 capsule (250 mg total) by mouth 3 (three) times daily. for 5 days  Dispense: 15 capsule; Refill: 0  -     triamcinolone acetonide 0.025% (KENALOG) 0.025 % Oint; Apply topically 2 (two) times daily as needed.  Dispense: 80 g; Refill: 0    Patient was reassured.  She would like prophylactic antibiotics.  She declined tetanus booster.  Follow up as needed.          Risks, benefits, and side effects were discussed with the patient. All questions were answered to the fullest satisfaction of the patient, and pt verbalized understanding and agreement to treatment plan. Pt was to call with any new or worsening symptoms, or present to the ER.

## (undated) DEVICE — Device

## (undated) DEVICE — WARMER SCOPE DISP

## (undated) DEVICE — GLOVE SURGICAL PROTEXIS PI BLUE SIZE 8.0

## (undated) DEVICE — TROCAR BLADELESS Z-THREAD 5MM X 100MM

## (undated) DEVICE — TROCAR BLADELESS Z-THREAD 11MM X 100MM

## (undated) DEVICE — CDS LAP CHOLE

## (undated) DEVICE — ENDO POUCH

## (undated) DEVICE — PENCIL HANDSWITCHING ROCKER SW

## (undated) DEVICE — GLOVE SURGICAL PROTEXIS PI BLUE SIZE 6.0

## (undated) DEVICE — LAPARSCOPIC L-HOOK WIRE

## (undated) DEVICE — STRIP CLOSURE SKIN 1/2 X 4 IN

## (undated) DEVICE — NEEDLE INSUFFLATION DISP 14G X 120

## (undated) DEVICE — TROCAR SLEEVE Z-THREAD 5MM X 100MM

## (undated) DEVICE — SUTURE VICRYL 0 UR-6 27 IN VIOLET

## (undated) DEVICE — GLOVE SURGICAL PROTEXIS PI SIZE 8.0

## (undated) DEVICE — SUTURE MONOCRYL 4-0 27IN

## (undated) DEVICE — ADHESIVE LIQUID MASTISOL 2/3CC

## (undated) DEVICE — GLOVE SURGICAL PROTEXIS PI SIZE 6

## (undated) DEVICE — GOWN SURGICAL SMARTGOWN LEVEL 4 / EXTRA LARGE STERILE

## (undated) DEVICE — IRRIGATOR SUCTION STYKERFLOW II DISP